# Patient Record
Sex: FEMALE | Race: WHITE | Employment: OTHER | URBAN - METROPOLITAN AREA
[De-identification: names, ages, dates, MRNs, and addresses within clinical notes are randomized per-mention and may not be internally consistent; named-entity substitution may affect disease eponyms.]

---

## 2017-06-26 ENCOUNTER — HOSPITAL ENCOUNTER (OUTPATIENT)
Dept: MAMMOGRAPHY | Facility: CLINIC | Age: 62
Discharge: HOME | End: 2017-06-26
Attending: FAMILY MEDICINE | Admitting: FAMILY MEDICINE
Payer: COMMERCIAL

## 2017-06-26 DIAGNOSIS — Z12.31 VISIT FOR SCREENING MAMMOGRAM: ICD-10-CM

## 2017-06-26 PROCEDURE — G0202 SCR MAMMO BI INCL CAD: HCPCS

## 2017-12-23 ENCOUNTER — APPOINTMENT (OUTPATIENT)
Dept: GENERAL RADIOLOGY | Facility: CLINIC | Age: 62
End: 2017-12-23
Attending: EMERGENCY MEDICINE
Payer: COMMERCIAL

## 2017-12-23 ENCOUNTER — APPOINTMENT (OUTPATIENT)
Dept: MRI IMAGING | Facility: CLINIC | Age: 62
End: 2017-12-23
Attending: EMERGENCY MEDICINE
Payer: COMMERCIAL

## 2017-12-23 ENCOUNTER — HOSPITAL ENCOUNTER (EMERGENCY)
Facility: CLINIC | Age: 62
Discharge: HOME | End: 2017-12-24
Attending: EMERGENCY MEDICINE | Admitting: EMERGENCY MEDICINE
Payer: COMMERCIAL

## 2017-12-23 DIAGNOSIS — M60.009 VIRAL MYOSITIS: ICD-10-CM

## 2017-12-23 DIAGNOSIS — M54.10 RADICULAR PAIN OF BOTH LOWER EXTREMITIES: ICD-10-CM

## 2017-12-23 DIAGNOSIS — R50.9 FEVER, UNSPECIFIED FEVER CAUSE: ICD-10-CM

## 2017-12-23 DIAGNOSIS — B97.89 VIRAL MYOSITIS: ICD-10-CM

## 2017-12-23 LAB
ALBUMIN SERPL-MCNC: 3.3 G/DL (ref 3.4–5)
ALBUMIN UR-MCNC: 100 MG/DL
ALP SERPL-CCNC: 42 U/L (ref 40–150)
ALT SERPL W P-5'-P-CCNC: 57 U/L (ref 0–50)
ANION GAP SERPL CALCULATED.3IONS-SCNC: 10 MMOL/L (ref 3–14)
APPEARANCE UR: ABNORMAL
AST SERPL W P-5'-P-CCNC: 90 U/L (ref 0–45)
BACTERIA #/AREA URNS HPF: ABNORMAL /HPF
BASOPHILS # BLD AUTO: 0 10E9/L (ref 0–0.2)
BASOPHILS NFR BLD AUTO: 0.2 %
BILIRUB SERPL-MCNC: 0.9 MG/DL (ref 0.2–1.3)
BILIRUB UR QL STRIP: NEGATIVE
BUN SERPL-MCNC: 20 MG/DL (ref 7–30)
CALCIUM SERPL-MCNC: 7.8 MG/DL (ref 8.5–10.1)
CHLORIDE SERPL-SCNC: 95 MMOL/L (ref 94–109)
CK SERPL-CCNC: 222 U/L (ref 30–225)
CO2 SERPL-SCNC: 23 MMOL/L (ref 20–32)
COLOR UR AUTO: YELLOW
CREAT SERPL-MCNC: 0.75 MG/DL (ref 0.52–1.04)
CRP SERPL-MCNC: 14.1 MG/L (ref 0–8)
DIFFERENTIAL METHOD BLD: ABNORMAL
EOSINOPHIL # BLD AUTO: 0 10E9/L (ref 0–0.7)
EOSINOPHIL NFR BLD AUTO: 0.4 %
ERYTHROCYTE [DISTWIDTH] IN BLOOD BY AUTOMATED COUNT: 11.9 % (ref 10–15)
ERYTHROCYTE [SEDIMENTATION RATE] IN BLOOD BY WESTERGREN METHOD: 7 MM/H (ref 0–30)
FLUAV+FLUBV AG SPEC QL: NEGATIVE
FLUAV+FLUBV AG SPEC QL: NEGATIVE
GFR SERPL CREATININE-BSD FRML MDRD: 78 ML/MIN/1.7M2
GLUCOSE SERPL-MCNC: 110 MG/DL (ref 70–99)
GLUCOSE UR STRIP-MCNC: NEGATIVE MG/DL
HCT VFR BLD AUTO: 35.4 % (ref 35–47)
HGB BLD-MCNC: 12.9 G/DL (ref 11.7–15.7)
HGB UR QL STRIP: NEGATIVE
IMM GRANULOCYTES # BLD: 0 10E9/L (ref 0–0.4)
IMM GRANULOCYTES NFR BLD: 0.4 %
KETONES UR STRIP-MCNC: NEGATIVE MG/DL
LEUKOCYTE ESTERASE UR QL STRIP: NEGATIVE
LYMPHOCYTES # BLD AUTO: 0.7 10E9/L (ref 0.8–5.3)
LYMPHOCYTES NFR BLD AUTO: 13.1 %
MCH RBC QN AUTO: 32.6 PG (ref 26.5–33)
MCHC RBC AUTO-ENTMCNC: 36.4 G/DL (ref 31.5–36.5)
MCV RBC AUTO: 89 FL (ref 78–100)
MONOCYTES # BLD AUTO: 0.3 10E9/L (ref 0–1.3)
MONOCYTES NFR BLD AUTO: 5.1 %
MUCOUS THREADS #/AREA URNS LPF: PRESENT /LPF
MYOGLOBIN SERPL-MCNC: 155 UG/L
NEUTROPHILS # BLD AUTO: 4.5 10E9/L (ref 1.6–8.3)
NEUTROPHILS NFR BLD AUTO: 80.8 %
NITRATE UR QL: NEGATIVE
NRBC # BLD AUTO: 0 10*3/UL
NRBC BLD AUTO-RTO: 0 /100
PH UR STRIP: 6 PH (ref 5–7)
PLATELET # BLD AUTO: 93 10E9/L (ref 150–450)
PLATELET # BLD EST: ABNORMAL 10*3/UL
POTASSIUM SERPL-SCNC: 3.6 MMOL/L (ref 3.4–5.3)
PROT SERPL-MCNC: 6.7 G/DL (ref 6.8–8.8)
RBC # BLD AUTO: 3.96 10E12/L (ref 3.8–5.2)
RBC #/AREA URNS AUTO: 2 /HPF (ref 0–2)
RBC MORPH BLD: ABNORMAL
SODIUM SERPL-SCNC: 128 MMOL/L (ref 133–144)
SOURCE: ABNORMAL
SP GR UR STRIP: 1.02 (ref 1–1.03)
SPECIMEN SOURCE: NORMAL
SQUAMOUS #/AREA URNS AUTO: 1 /HPF (ref 0–1)
UROBILINOGEN UR STRIP-MCNC: 0 MG/DL (ref 0–2)
VARIANT LYMPHS BLD QL SMEAR: PRESENT
WBC # BLD AUTO: 5.5 10E9/L (ref 4–11)
WBC #/AREA URNS AUTO: 4 /HPF (ref 0–2)

## 2017-12-23 PROCEDURE — 87804 INFLUENZA ASSAY W/OPTIC: CPT | Performed by: EMERGENCY MEDICINE

## 2017-12-23 PROCEDURE — 96374 THER/PROPH/DIAG INJ IV PUSH: CPT | Mod: 59

## 2017-12-23 PROCEDURE — 25000128 H RX IP 250 OP 636: Performed by: EMERGENCY MEDICINE

## 2017-12-23 PROCEDURE — 80053 COMPREHEN METABOLIC PANEL: CPT | Performed by: EMERGENCY MEDICINE

## 2017-12-23 PROCEDURE — 71020 XR CHEST 2 VW: CPT

## 2017-12-23 PROCEDURE — 25000132 ZZH RX MED GY IP 250 OP 250 PS 637: Performed by: EMERGENCY MEDICINE

## 2017-12-23 PROCEDURE — 72158 MRI LUMBAR SPINE W/O & W/DYE: CPT

## 2017-12-23 PROCEDURE — 86140 C-REACTIVE PROTEIN: CPT | Performed by: EMERGENCY MEDICINE

## 2017-12-23 PROCEDURE — 99285 EMERGENCY DEPT VISIT HI MDM: CPT | Mod: 25

## 2017-12-23 PROCEDURE — 85025 COMPLETE CBC W/AUTO DIFF WBC: CPT | Performed by: EMERGENCY MEDICINE

## 2017-12-23 PROCEDURE — 82550 ASSAY OF CK (CPK): CPT | Performed by: EMERGENCY MEDICINE

## 2017-12-23 PROCEDURE — 83874 ASSAY OF MYOGLOBIN: CPT | Performed by: EMERGENCY MEDICINE

## 2017-12-23 PROCEDURE — 87040 BLOOD CULTURE FOR BACTERIA: CPT | Mod: 91 | Performed by: EMERGENCY MEDICINE

## 2017-12-23 PROCEDURE — 96375 TX/PRO/DX INJ NEW DRUG ADDON: CPT

## 2017-12-23 PROCEDURE — 36415 COLL VENOUS BLD VENIPUNCTURE: CPT | Performed by: EMERGENCY MEDICINE

## 2017-12-23 PROCEDURE — 96361 HYDRATE IV INFUSION ADD-ON: CPT

## 2017-12-23 PROCEDURE — 81001 URINALYSIS AUTO W/SCOPE: CPT | Performed by: EMERGENCY MEDICINE

## 2017-12-23 PROCEDURE — 85652 RBC SED RATE AUTOMATED: CPT | Performed by: EMERGENCY MEDICINE

## 2017-12-23 PROCEDURE — A9585 GADOBUTROL INJECTION: HCPCS | Performed by: EMERGENCY MEDICINE

## 2017-12-23 RX ORDER — SODIUM CHLORIDE 9 MG/ML
1000 INJECTION, SOLUTION INTRAVENOUS CONTINUOUS
Status: DISCONTINUED | OUTPATIENT
Start: 2017-12-23 | End: 2017-12-24 | Stop reason: HOSPADM

## 2017-12-23 RX ORDER — KETOROLAC TROMETHAMINE 30 MG/ML
30 INJECTION, SOLUTION INTRAMUSCULAR; INTRAVENOUS ONCE
Status: COMPLETED | OUTPATIENT
Start: 2017-12-23 | End: 2017-12-23

## 2017-12-23 RX ORDER — GADOBUTROL 604.72 MG/ML
7.5 INJECTION INTRAVENOUS ONCE
Status: COMPLETED | OUTPATIENT
Start: 2017-12-23 | End: 2017-12-23

## 2017-12-23 RX ORDER — ACETAMINOPHEN 325 MG/1
650 TABLET ORAL ONCE
Status: COMPLETED | OUTPATIENT
Start: 2017-12-23 | End: 2017-12-23

## 2017-12-23 RX ORDER — HYDROMORPHONE HYDROCHLORIDE 1 MG/ML
0.5 INJECTION, SOLUTION INTRAMUSCULAR; INTRAVENOUS; SUBCUTANEOUS
Status: DISCONTINUED | OUTPATIENT
Start: 2017-12-23 | End: 2017-12-24 | Stop reason: HOSPADM

## 2017-12-23 RX ADMIN — HYDROMORPHONE HYDROCHLORIDE 0.5 MG: 1 INJECTION, SOLUTION INTRAMUSCULAR; INTRAVENOUS; SUBCUTANEOUS at 22:17

## 2017-12-23 RX ADMIN — KETOROLAC TROMETHAMINE 30 MG: 30 INJECTION, SOLUTION INTRAMUSCULAR at 21:17

## 2017-12-23 RX ADMIN — SODIUM CHLORIDE 1000 ML: 9 INJECTION, SOLUTION INTRAVENOUS at 21:17

## 2017-12-23 RX ADMIN — ACETAMINOPHEN 650 MG: 325 TABLET, FILM COATED ORAL at 21:17

## 2017-12-23 RX ADMIN — GADOBUTROL 7 ML: 604.72 INJECTION INTRAVENOUS at 23:38

## 2017-12-23 ASSESSMENT — ENCOUNTER SYMPTOMS
VOMITING: 0
FEVER: 1
ARTHRALGIAS: 1
BACK PAIN: 1
NUMBNESS: 0
WEAKNESS: 0
CHILLS: 1
ABDOMINAL PAIN: 0
SHORTNESS OF BREATH: 0
NAUSEA: 0

## 2017-12-23 NOTE — ED AVS SNAPSHOT
[facility] Emergency Department    [address] [place] [address]    Phone:  [phone]    Fax:  [phone]                                       Kaleigh Ziegler   MRN: [id number]    Department:  [facility] Emergency Department   Date of Visit:  12/23/2017           Patient Information     Date Of Birth          1955        Your diagnoses for this visit were:     Fever, unspecified fever cause     Viral myositis     Radicular pain of both lower extremities        You were seen by Ej Fry MD and Saul Garcia MD.      Follow-up Information     Follow up with Heaven Rose MD.    Specialty:  Family Practice    Why:  Early this week in f/u    Contact information:    [facility]  [address]  [place] [address]  [phone]          Discharge Instructions       Discharge Instructions  Fever    You have been seen today for a fever. Fever is a normal body reaction to illness or inflammation. Fever is a sign that your body is doing what it should to fight something off. Fever is not dangerous, but it can make you feel miserable, and you will probably feel better if you get your fever to go down. Most infections are caused by a virus, and antibiotics will not help; your provider will tell you whether antibiotics are needed in your case. At this time your provider does not find that your fever is a sign of anything dangerous or life-threatening.  However, sometimes the signs of serious illness do not show up right away so additional care may be necessary.    Generally, every Emergency Department visit should have a follow-up clinic visit with either a primary or a specialty clinic/provider. Please follow-up as instructed by your emergency provider today.    What can I do to help myself?    Fill any prescriptions the provider gave you and take them right away--especially antibiotics.    If you have a fever, get plenty of  rest and drink lots of fluids, especially water.    What clothes or blankets you have on will not change your fever. Do what is comfortable for you.    Bathing or sponging in lukewarm water may help you feel better.    Tylenol  (acetaminophen), Motrin  (ibuprofen), or Advil  (ibuprofen) help bring fever down and may help you feel more comfortable. Be sure to read and follow the package directions, and ask your provider if you have questions.    Do not drink alcohol.    Return to the Emergency Department if:    Any of the symptoms you have get much worse.    You seem very sick, like being too weak to get up.    You have any new symptoms, especially serious things like abdominal (belly) pain or chest pain.    You are short of breath.    You have a severe headache.    You are vomiting (throwing up) so much you cannot keep fluids or medicines down.    You have confusion or seem unusually drowsy.    You have a seizure.    You have anything else that worries you.  If you were given a prescription for medicine here today, be sure to read all of the information (including the package insert) that comes with your prescription.  This will include important information about the medicine, its side effects, and any warnings that you need to know about.  The pharmacist who fills the prescription can provide more information and answer questions you may have about the medicine.  If you have questions or concerns that the pharmacist cannot address, please call or return to the Emergency Department.   Remember that you can always come back to the Emergency Department if you are not able to see your regular provider in the amount of time listed above, if you get any new symptoms, or if there is anything that worries you.      Discharge References/Attachments     LUMBAR RADICULOPATHY, UNDERSTANDING (ENGLISH)      24 Hour Appointment Hotline       To make an appointment at any [facility], call [phone] ([phone]). If  you don't have a family doctor or clinic, we will help you find one. [facility] are conveniently located to serve the needs of you and your family.             Review of your medicines      START taking        Dose / Directions Last dose taken    traMADol 50 MG tablet   Commonly known as:  ULTRAM   Dose:  50 mg   Quantity:  10 tablet        Take 1 tablet (50 mg) by mouth every 6 hours as needed for pain   Refills:  0                Prescriptions were sent or printed at these locations (1 Prescription)                   Other Prescriptions                Printed at Department/Unit printer (1 of 1)         traMADol (ULTRAM) 50 MG tablet                Procedures and tests performed during your visit     Procedure/Test Number of Times Performed    Blood culture 2    CBC with platelets differential 1    CK total 1    CRP inflammation 1    Chest XR,  PA & LAT 1    Comprehensive metabolic panel 1    Erythrocyte sedimentation rate auto 1    Influenza A/B antigen 1    MR Lumbar Spine w/o & w Contrast 1    Myoglobin 1    UA with Microscopic reflex to Culture 1      Orders Needing Specimen Collection     None      Pending Results     Date and Time Order Name Status Description    12/23/2017 2151 MR Lumbar Spine w/o & w Contrast In process     12/23/2017 2110 Blood culture In process     12/23/2017 2101 Blood culture Preliminary             Pending Culture Results     Date and Time Order Name Status Description    12/23/2017 2110 Blood culture In process     12/23/2017 2101 Blood culture Preliminary             Pending Results Instructions     If you had any lab results that were not finalized at the time of your Discharge, you can call the ED Lab Result RN at [phone]. You will be contacted by this team for any positive Lab results or changes in treatment. The nurses are available 7 days a week from 10A to 6:30P.  You can leave a message 24 hours per day and they will return your call.        Test Results From Your  Hospital Stay        12/23/2017  9:51 PM      Component Results     Component Value Ref Range & Units Status    WBC 5.5 4.0 - 11.0 10e9/L Final    RBC Count 3.96 3.8 - 5.2 10e12/L Final    Hemoglobin 12.9 11.7 - 15.7 g/dL Final    Hematocrit 35.4 35.0 - 47.0 % Final    MCV 89 78 - 100 fl Final    MCH 32.6 26.5 - 33.0 pg Final    MCHC 36.4 31.5 - 36.5 g/dL Final    RDW 11.9 10.0 - 15.0 % Final    Platelet Count 93 (L) 150 - 450 10e9/L Final    Diff Method Automated Method  Final    % Neutrophils 80.8 % Final    % Lymphocytes 13.1 % Final    % Monocytes 5.1 % Final    % Eosinophils 0.4 % Final    % Basophils 0.2 % Final    % Immature Granulocytes 0.4 % Final    Nucleated RBCs 0 0 /100 Final    Absolute Neutrophil 4.5 1.6 - 8.3 10e9/L Final    Absolute Lymphocytes 0.7 (L) 0.8 - 5.3 10e9/L Final    Absolute Monocytes 0.3 0.0 - 1.3 10e9/L Final    Absolute Eosinophils 0.0 0.0 - 0.7 10e9/L Final    Absolute Basophils 0.0 0.0 - 0.2 10e9/L Final    Abs Immature Granulocytes 0.0 0 - 0.4 10e9/L Final    Absolute Nucleated RBC 0.0  Final    Reactive Lymphs Present  Final    RBC Morphology   Final    Consistent with reported results    Platelet Estimate   Final    Automated count confirmed.  Platelet morphology is normal.         12/23/2017  9:43 PM      Component Results     Component Value Ref Range & Units Status    Sodium 128 (L) 133 - 144 mmol/L Final    Potassium 3.6 3.4 - 5.3 mmol/L Final    Chloride 95 94 - 109 mmol/L Final    Carbon Dioxide 23 20 - 32 mmol/L Final    Anion Gap 10 3 - 14 mmol/L Final    Glucose 110 (H) 70 - 99 mg/dL Final    Urea Nitrogen 20 7 - 30 mg/dL Final    Creatinine 0.75 0.52 - 1.04 mg/dL Final    GFR Estimate 78 >60 mL/min/1.7m2 Final    Non  GFR Calc    GFR Estimate If Black >90 >60 mL/min/1.7m2 Final    African American GFR Calc    Calcium 7.8 (L) 8.5 - 10.1 mg/dL Final    Bilirubin Total 0.9 0.2 - 1.3 mg/dL Final    Albumin 3.3 (L) 3.4 - 5.0 g/dL Final    Protein Total 6.7  (L) 6.8 - 8.8 g/dL Final    Alkaline Phosphatase 42 40 - 150 U/L Final    ALT 57 (H) 0 - 50 U/L Final    AST 90 (H) 0 - 45 U/L Final         12/23/2017  9:43 PM      Component Results     Component Value Ref Range & Units Status    CRP Inflammation 14.1 (H) 0.0 - 8.0 mg/L Final         12/23/2017  9:37 PM      Component Results     Component Value Ref Range & Units Status    Sed Rate 7 0 - 30 mm/h Final         12/23/2017 11:04 PM      Component Results     Component    Specimen Description    Blood Left Arm    Special Requests    Aerobic and anaerobic bottles received    Culture Micro    PENDING         12/23/2017  9:43 PM      Component Results     Component Value Ref Range & Units Status    Myoglobin 155 (H) <120 ug/L Final         12/23/2017  9:43 PM      Component Results     Component Value Ref Range & Units Status    CK Total 222 30 - 225 U/L Final         12/23/2017  9:43 PM      Component Results     Component Value Ref Range & Units Status    Influenza A/B Agn Specimen Nasal  Final    Influenza A Negative NEG^Negative Final    Influenza B Negative NEG^Negative Final    Test results must be correlated with clinical data. If necessary, results   should be confirmed by a molecular assay or viral culture.           12/23/2017  9:36 PM         12/23/2017 11:00 PM      Result not yet available     Exam Ended         12/23/2017 10:48 PM      Component Results     Component Value Ref Range & Units Status    Color Urine Yellow  Final    Appearance Urine Slightly Cloudy  Final    Glucose Urine Negative NEG^Negative mg/dL Final    Bilirubin Urine Negative NEG^Negative Final    Ketones Urine Negative NEG^Negative mg/dL Final    Specific Gravity Urine 1.024 1.003 - 1.035 Final    Blood Urine Negative NEG^Negative Final    pH Urine 6.0 5.0 - 7.0 pH Final    Protein Albumin Urine 100 (A) NEG^Negative mg/dL Final    Urobilinogen mg/dL 0.0 0.0 - 2.0 mg/dL Final    Nitrite Urine Negative NEG^Negative Final    Leukocyte  Esterase Urine Negative NEG^Negative Final    Source Midstream Urine  Final    WBC Urine 4 (H) 0 - 2 /HPF Final    RBC Urine 2 0 - 2 /HPF Final    Bacteria Urine Few (A) NEG^Negative /HPF Final    Squamous Epithelial /HPF Urine 1 0 - 1 /HPF Final    Mucous Urine Present (A) NEG^Negative /LPF Final         12/23/2017 11:02 PM      Narrative     CHEST TWO VIEWS    12/23/2017 10:52 PM     HISTORY: Fever.    COMPARISON: None.        Impression     IMPRESSION: Normal.     AMBROCIO GARCIA MD                Clinical Quality Measure: Blood Pressure Screening     Your blood pressure was checked while you were in the emergency department today. The last reading we obtained was  BP: 107/60 . Please read the guidelines below about what these numbers mean and what you should do about them.  If your systolic blood pressure (the top number) is less than 120 and your diastolic blood pressure (the bottom number) is less than 80, then your blood pressure is normal. There is nothing more that you need to do about it.  If your systolic blood pressure (the top number) is 120-139 or your diastolic blood pressure (the bottom number) is 80-89, your blood pressure may be higher than it should be. You should have your blood pressure rechecked within a year by a primary care provider.  If your systolic blood pressure (the top number) is 140 or greater or your diastolic blood pressure (the bottom number) is 90 or greater, you may have high blood pressure. High blood pressure is treatable, but if left untreated over time it can put you at risk for heart attack, stroke, or kidney failure. You should have your blood pressure rechecked by a primary care provider within the next 4 weeks.  If your provider in the emergency department today gave you specific instructions to follow-up with your doctor or provider even sooner than that, you should follow that instruction and not wait for up to 4 weeks for your follow-up visit.        Thank you for  "choosing [facility]       Thank you for choosing [facility] for your care. Our goal is always to provide you with excellent care. Hearing back from our patients is one way we can continue to improve our services. Please take a few minutes to complete the written survey that you may receive in the mail after you visit with us. Thank you!        [employer] Information     [employer]unb lets you send messages to your doctor, view your test results, renew your prescriptions, schedule appointments and more. To sign up, go to [email or web address] . Click on \"Log in\" on the left side of the screen, which will take you to the Welcome page. Then click on \"Sign up Now\" on the right side of the page.     You will be asked to enter the access code listed below, as well as some personal information. Please follow the directions to create your username and password.     Your access code is: [email or web address]  Expires: 3/24/2018 12:54 AM     Your access code will  in 90 days. If you need help or a new code, please call your [facility] clinic or [phone].        Care EveryWhere ID     This is your Care EveryWhere ID. This could be used by other organizations to access your [facility] medical records  [id number]        Equal Access to Services     LUCAS MEANS : Yogi Holloway, wachatoda kiana, qaybta kaalmada adedeidre, bryan cabrera. So [facility] [phone].    ATENCIÓN: Si habla español, tiene a laughlin disposición servicios gratuitos de asistencia lingüística. Llame al [phone].    We comply with applicable federal civil rights laws and [place] laws. We do not discriminate on the basis of race, color, national origin, age, disability, sex, sexual orientation, or gender identity.            After Visit Summary       This is your record. Keep this with you and show to your community pharmacist(s) and doctor(s) at your next visit.                  "

## 2017-12-23 NOTE — ED AVS SNAPSHOT
[facility] Emergency Department    [address]    [facility] [address]    Phone:  [phone]    Fax:  [phone]                                       Kaleigh Ziegler   MRN: [id number]    Department:  [facility] Emergency Department   Date of Visit:  12/23/2017           After Visit Summary Signature Page     I have received my discharge instructions, and my questions have been answered. I have discussed any challenges I see with this plan with the nurse or doctor.    ..........................................................................................................................................  Patient/Patient Representative Signature      ..........................................................................................................................................  Patient Representative Print Name and Relationship to Patient    ..................................................               ................................................  Date                                            Time    ..........................................................................................................................................  Reviewed by Signature/Title    ...................................................              ..............................................  Date                                                            Time

## 2017-12-24 VITALS
OXYGEN SATURATION: 98 % | DIASTOLIC BLOOD PRESSURE: 72 MMHG | WEIGHT: 149.47 LBS | TEMPERATURE: 99 F | HEART RATE: 90 BPM | RESPIRATION RATE: 18 BRPM | SYSTOLIC BLOOD PRESSURE: 112 MMHG

## 2017-12-24 RX ORDER — TRAMADOL HYDROCHLORIDE 50 MG/1
50 TABLET ORAL EVERY 6 HOURS PRN
Qty: 10 TABLET | Refills: 0 | Status: ON HOLD | OUTPATIENT
Start: 2017-12-24 | End: 2018-01-16

## 2017-12-24 NOTE — ED NOTES
Patient signed out to me at 2300.  Fever and bilateral LE radiculopathy symptoms.  Labs c/w viral process (normal wbc, thrombocytopenia, mild lft elevation, hyponatremia).  See work up to this point.  MRI of spine pending to r/o abscess, discitis, transverse myelitis, ect.  If ok, plan to d/c.  Blood cultures sent.  Influenza and CXR neg.  UA negative.      Saul Garcia MD  12/23/17 6441

## 2017-12-24 NOTE — DISCHARGE INSTRUCTIONS
Discharge Instructions  Fever    You have been seen today for a fever. Fever is a normal body reaction to illness or inflammation. Fever is a sign that your body is doing what it should to fight something off. Fever is not dangerous, but it can make you feel miserable, and you will probably feel better if you get your fever to go down. Most infections are caused by a virus, and antibiotics will not help; your provider will tell you whether antibiotics are needed in your case. At this time your provider does not find that your fever is a sign of anything dangerous or life-threatening.  However, sometimes the signs of serious illness do not show up right away so additional care may be necessary.    Generally, every Emergency Department visit should have a follow-up clinic visit with either a primary or a specialty clinic/provider. Please follow-up as instructed by your emergency provider today.    What can I do to help myself?    Fill any prescriptions the provider gave you and take them right away--especially antibiotics.    If you have a fever, get plenty of rest and drink lots of fluids, especially water.    What clothes or blankets you have on will not change your fever. Do what is comfortable for you.    Bathing or sponging in lukewarm water may help you feel better.    Tylenol  (acetaminophen), Motrin  (ibuprofen), or Advil  (ibuprofen) help bring fever down and may help you feel more comfortable. Be sure to read and follow the package directions, and ask your provider if you have questions.    Do not drink alcohol.    Return to the Emergency Department if:    Any of the symptoms you have get much worse.    You seem very sick, like being too weak to get up.    You have any new symptoms, especially serious things like abdominal (belly) pain or chest pain.    You are short of breath.    You have a severe headache.    You are vomiting (throwing up) so much you cannot keep fluids or medicines down.    You have  confusion or seem unusually drowsy.    You have a seizure.    You have anything else that worries you.  If you were given a prescription for medicine here today, be sure to read all of the information (including the package insert) that comes with your prescription.  This will include important information about the medicine, its side effects, and any warnings that you need to know about.  The pharmacist who fills the prescription can provide more information and answer questions you may have about the medicine.  If you have questions or concerns that the pharmacist cannot address, please call or return to the Emergency Department.   Remember that you can always come back to the Emergency Department if you are not able to see your regular provider in the amount of time listed above, if you get any new symptoms, or if there is anything that worries you.

## 2017-12-24 NOTE — ED NOTES
Prelim MRI read:  No acute abnormality of the lumbar spine.  Mild degenerative changes in the lumbar spine including a few posterior disc buldges.      Saul Garcia MD  12/24/17 0027

## 2017-12-24 NOTE — ED PROVIDER NOTES
History     Chief Complaint:  Leg Pain    HPI:  Kaleigh Ziegler is a 62 year old female who presents with leg pain. The patient reports that she has been experiencing cramping tightness in her upper and lower right leg, worse in her lower leg. This has been ongoing for the past couple months and has been relieved with stretching of her IT band. However, she developed a slight fever about four days ago along with chills. In addition, her leg pain has worsened and is even worse with laying down. Today, she developed the same pain in her right leg, leading to her ED visit. Additionally, she complains of low back pain that is ongoing from a previous biking history, but this is not a concern of hers. She denies significant abdominal pain, nausea, vomiting, cough, shortness of breath, rash, numbness, or weakness. She has not taken anything for pain as she prefers homeopathy. She does regularly see a primary care doctor.     Allergies:  No known drug allergies      Medications:    The patient is not currently taking any prescribed medications.      Past Medical History:    History of mitral valve prolapse    Past Surgical History:    GYN surgery  Tubal ligation    Family History:    History reviewed. No pertinent family history.      Social History:  Smoking status: Never Smoker  Alcohol use: No   Marital Status:     Presents with  at bedside.      Review of Systems   Constitutional: Positive for chills and fever.   Respiratory: Negative for shortness of breath.    Gastrointestinal: Negative for abdominal pain, nausea and vomiting.   Musculoskeletal: Positive for arthralgias and back pain.   Skin: Negative for rash.   Neurological: Negative for weakness and numbness.   All other systems reviewed and are negative.      Physical Exam     Patient Vitals for the past 24 hrs:   BP Temp Temp src Pulse Resp SpO2 Weight   12/23/17 2040 107/60 100.8  F (38.2  C) Oral 103 14 94 % 67.8 kg (149 lb 7.6 oz)       Physical Exam   Constitutional: She is oriented to person, place, and time. She appears well-developed.   HENT:   Head: Normocephalic and atraumatic.   Right Ear: External ear normal.   Mouth/Throat: Oropharynx is clear and moist.   Eyes: Conjunctivae and EOM are normal. Pupils are equal, round, and reactive to light.   Neck: Normal range of motion. Neck supple. No JVD present.   Cardiovascular: Normal rate, regular rhythm and normal heart sounds.    Pulmonary/Chest: Effort normal and breath sounds normal.   Abdominal: Soft. Bowel sounds are normal. She exhibits no distension. There is no tenderness. There is no rebound.   Musculoskeletal: Normal range of motion.        Legs:  Lymphadenopathy:     She has no cervical adenopathy.   Neurological: She is alert and oriented to person, place, and time. She displays normal reflexes. No cranial nerve deficit. She exhibits normal muscle tone. Coordination normal.   Skin: Skin is warm and dry.   Psychiatric: She has a normal mood and affect. Her behavior is normal. Judgment normal.   Nursing note and vitals reviewed.  :    Emergency Department Course     Imaging:  Radiographic findings were communicated with the patient and family who voiced understanding of the findings.    X-ray Chest, 2 views:  Pending    MR Lumbar Spine w/o & w/ Contrast:  Pending    Laboratory:  Influenza A/B: Negative  CBC: PLT 93 (L), o/w WNL (WBC 5.5, HGB 12.9)    CMP: Sodium 128 (L), Glucose 110 (H), Calcium 7.8 (L), Albumin 3.3 (L), Protein Total 6.7 (L), ALT 57 (H), AST 90 (H), o/w WNL (Creatinine 0.75)   CRP Inflammation: 14.1 (H)   ESR: 7  Blood Culture: Pending  Myoglobin: 155 (H)   CK Total: 222    Interventions:  2117- NS 1L IV Bolus   2117- Toradol 30 mg IV  2117- Tylenol 650 mg IV  2217- Dilaudid 0.5 mg IV    Emergency Department Course:  Past medical records, nursing notes, and vitals reviewed.  2050: I performed an exam of the patient and obtained history, as documented above. GCS 15.      IV inserted and blood drawn.     A nasal specimen was collected and tested.     The above interventions were administered.     2146: I rechecked the patient. Explained findings to the patient.     The patient was sent for a chest X-Ray while in the emergency department, findings above.     The patient was sent for a lumbar spine MRI while in the emergency department, findings above.     The patient was signed-out to the oncoming physician.     Impression & Plan      Medical Decision Making:  Kaleigh Ziegler is a 62 year old female who presents to the emergency department for evaluation of bilateral leg pain. Over the past few months, she reports similar pain, but this has been worse for the past few weeks. She has no cauda equina symptoms, but she describes bilateral radiculopathy and has a low-grade fever here. The cause of her symptoms is unclear and her inflammatory testing is indeterminate. She has no clear focus of infection on her examination other than lumbar radiculopathy. Differential diagnosis includes transverse myelitis, epidural abscess, and diskitis. I recommended MRI with and without contrast. This will be signed out to the oncoming physician. If this is negative, we will consider discharge home with medication for pain and follow up with primary care.     Provisional Diagnosis:  1. Bilateral S1 radiculopathy  2. Fever    Diagnosis:    ICD-10-CM    1. Fever, unspecified fever cause R50.9 Blood culture     Blood culture   2. Viral myositis M60.009     B97.89    3. Radicular pain of both lower extremities M54.10        Disposition:  Signed out to the oncoming physician.     Yenny Ulrich  12/23/2017   [facility] EMERGENCY DEPARTMENT    I, Yenny Ulrich, am serving as a scribe at 8:50 PM on 12/23/2017 to document services personally performed by Ej Fry MD based on my observations and the provider's statements to me.       Ej Fry MD  12/28/17 5419

## 2017-12-24 NOTE — ED NOTES
Pt reports cramping tightness in her right leg from her hip to her foot, worse in lower leg, happening on and off for the last few months. Last Tuesday pt began to have chills and felt better by morning but leg pain started getting worse. Pt states it is both legs now and believes the virus she got on Tuesday has settled in her legs. Pain is worse with lying down. Pt has not taken anything for the pain today. Pt has not taken any medications in 20 years, states she prefers homeopathy. Pt does have a primary care doctor she sees.

## 2017-12-27 ENCOUNTER — HOSPITAL ENCOUNTER (EMERGENCY)
Facility: CLINIC | Age: 62
Discharge: HOME | End: 2017-12-27
Attending: EMERGENCY MEDICINE | Admitting: EMERGENCY MEDICINE
Payer: COMMERCIAL

## 2017-12-27 VITALS
HEART RATE: 100 BPM | SYSTOLIC BLOOD PRESSURE: 115 MMHG | RESPIRATION RATE: 16 BRPM | TEMPERATURE: 99.3 F | DIASTOLIC BLOOD PRESSURE: 82 MMHG | OXYGEN SATURATION: 99 %

## 2017-12-27 DIAGNOSIS — G62.9 NEUROPATHY: ICD-10-CM

## 2017-12-27 DIAGNOSIS — B37.0 THRUSH: ICD-10-CM

## 2017-12-27 DIAGNOSIS — M54.40 BILATERAL LOW BACK PAIN WITH SCIATICA, SCIATICA LATERALITY UNSPECIFIED, UNSPECIFIED CHRONICITY: ICD-10-CM

## 2017-12-27 LAB
ALBUMIN SERPL-MCNC: 3.2 G/DL (ref 3.4–5)
ALP SERPL-CCNC: 60 U/L (ref 40–150)
ALT SERPL W P-5'-P-CCNC: 169 U/L (ref 0–50)
ANION GAP SERPL CALCULATED.3IONS-SCNC: 6 MMOL/L (ref 3–14)
AST SERPL W P-5'-P-CCNC: 146 U/L (ref 0–45)
BASOPHILS # BLD AUTO: 0.1 10E9/L (ref 0–0.2)
BASOPHILS NFR BLD AUTO: 0.7 %
BILIRUB SERPL-MCNC: 0.9 MG/DL (ref 0.2–1.3)
BUN SERPL-MCNC: 12 MG/DL (ref 7–30)
CALCIUM SERPL-MCNC: 8.4 MG/DL (ref 8.5–10.1)
CHLORIDE SERPL-SCNC: 98 MMOL/L (ref 94–109)
CK SERPL-CCNC: 68 U/L (ref 30–225)
CO2 SERPL-SCNC: 28 MMOL/L (ref 20–32)
CREAT SERPL-MCNC: 0.64 MG/DL (ref 0.52–1.04)
CRP SERPL-MCNC: 101 MG/L (ref 0–8)
DIFFERENTIAL METHOD BLD: NORMAL
EOSINOPHIL # BLD AUTO: 0.1 10E9/L (ref 0–0.7)
EOSINOPHIL NFR BLD AUTO: 0.9 %
ERYTHROCYTE [DISTWIDTH] IN BLOOD BY AUTOMATED COUNT: 12.7 % (ref 10–15)
ERYTHROCYTE [SEDIMENTATION RATE] IN BLOOD BY WESTERGREN METHOD: 25 MM/H (ref 0–30)
GFR SERPL CREATININE-BSD FRML MDRD: >90 ML/MIN/1.7M2
GLUCOSE SERPL-MCNC: 117 MG/DL (ref 70–99)
HAV IGG SER QL IA: REACTIVE
HBV SURFACE AB SERPL IA-ACNC: 203.16 M[IU]/ML
HBV SURFACE AG SERPL QL IA: NONREACTIVE
HCT VFR BLD AUTO: 36.4 % (ref 35–47)
HCV AB SERPL QL IA: NONREACTIVE
HGB BLD-MCNC: 12.6 G/DL (ref 11.7–15.7)
HIV 1+2 AB+HIV1 P24 AG SERPL QL IA: NONREACTIVE
IMM GRANULOCYTES # BLD: 0.1 10E9/L (ref 0–0.4)
IMM GRANULOCYTES NFR BLD: 1.2 %
LYMPHOCYTES # BLD AUTO: 0.8 10E9/L (ref 0.8–5.3)
LYMPHOCYTES NFR BLD AUTO: 12.3 %
MCH RBC QN AUTO: 31.7 PG (ref 26.5–33)
MCHC RBC AUTO-ENTMCNC: 34.6 G/DL (ref 31.5–36.5)
MCV RBC AUTO: 92 FL (ref 78–100)
MONOCYTES # BLD AUTO: 0.7 10E9/L (ref 0–1.3)
MONOCYTES NFR BLD AUTO: 9.8 %
NEUTROPHILS # BLD AUTO: 5.1 10E9/L (ref 1.6–8.3)
NEUTROPHILS NFR BLD AUTO: 75.1 %
NRBC # BLD AUTO: 0 10*3/UL
NRBC BLD AUTO-RTO: 0 /100
PLATELET # BLD AUTO: 320 10E9/L (ref 150–450)
POTASSIUM SERPL-SCNC: 4.5 MMOL/L (ref 3.4–5.3)
PROT SERPL-MCNC: 7.4 G/DL (ref 6.8–8.8)
RBC # BLD AUTO: 3.97 10E12/L (ref 3.8–5.2)
SODIUM SERPL-SCNC: 132 MMOL/L (ref 133–144)
WBC # BLD AUTO: 6.8 10E9/L (ref 4–11)

## 2017-12-27 PROCEDURE — 25000132 ZZH RX MED GY IP 250 OP 250 PS 637: Performed by: EMERGENCY MEDICINE

## 2017-12-27 PROCEDURE — 86706 HEP B SURFACE ANTIBODY: CPT | Performed by: EMERGENCY MEDICINE

## 2017-12-27 PROCEDURE — 85025 COMPLETE CBC W/AUTO DIFF WBC: CPT | Performed by: EMERGENCY MEDICINE

## 2017-12-27 PROCEDURE — 85652 RBC SED RATE AUTOMATED: CPT | Performed by: EMERGENCY MEDICINE

## 2017-12-27 PROCEDURE — 86708 HEPATITIS A ANTIBODY: CPT | Performed by: EMERGENCY MEDICINE

## 2017-12-27 PROCEDURE — 86618 LYME DISEASE ANTIBODY: CPT | Performed by: EMERGENCY MEDICINE

## 2017-12-27 PROCEDURE — 86803 HEPATITIS C AB TEST: CPT | Performed by: EMERGENCY MEDICINE

## 2017-12-27 PROCEDURE — 87340 HEPATITIS B SURFACE AG IA: CPT | Performed by: EMERGENCY MEDICINE

## 2017-12-27 PROCEDURE — 82550 ASSAY OF CK (CPK): CPT | Performed by: EMERGENCY MEDICINE

## 2017-12-27 PROCEDURE — 87389 HIV-1 AG W/HIV-1&-2 AB AG IA: CPT | Performed by: EMERGENCY MEDICINE

## 2017-12-27 PROCEDURE — 86140 C-REACTIVE PROTEIN: CPT | Performed by: EMERGENCY MEDICINE

## 2017-12-27 PROCEDURE — 80053 COMPREHEN METABOLIC PANEL: CPT | Performed by: EMERGENCY MEDICINE

## 2017-12-27 PROCEDURE — 99283 EMERGENCY DEPT VISIT LOW MDM: CPT

## 2017-12-27 PROCEDURE — 36415 COLL VENOUS BLD VENIPUNCTURE: CPT | Performed by: EMERGENCY MEDICINE

## 2017-12-27 RX ORDER — IBUPROFEN 600 MG/1
600 TABLET, FILM COATED ORAL ONCE
Status: COMPLETED | OUTPATIENT
Start: 2017-12-27 | End: 2017-12-27

## 2017-12-27 RX ORDER — DIAZEPAM 5 MG
5 TABLET ORAL ONCE
Status: COMPLETED | OUTPATIENT
Start: 2017-12-27 | End: 2017-12-27

## 2017-12-27 RX ADMIN — IBUPROFEN 600 MG: 600 TABLET ORAL at 13:08

## 2017-12-27 RX ADMIN — DIAZEPAM 5 MG: 5 TABLET ORAL at 13:07

## 2017-12-27 ASSESSMENT — ENCOUNTER SYMPTOMS
MYALGIAS: 1
APPETITE CHANGE: 0
FEVER: 1

## 2017-12-27 NOTE — ED AVS SNAPSHOT
[facility] Emergency Department    [address] [place] [address]    Phone:  [phone]    Fax:  [phone]                                       Kaleigh Ziegler   MRN: [id number]    Department:  [facility] Emergency Department   Date of Visit:  12/27/2017           Patient Information     Date Of Birth          1955        Your diagnoses for this visit were:     Thrush     Bilateral low back pain with sciatica, sciatica laterality unspecified, unspecified chronicity     Neuropathy        You were seen by Andrew Crandall MD.      Follow-up Information     Follow up with Heaven Rose MD. Schedule an appointment as soon as possible for a visit in 1 day.    Specialty:  Family Practice    Contact information:    [facility] [facility]  [address]  [place] [address]  [phone]          Discharge Instructions         Possible Causes of Low Back or Leg Pain    The symptoms in your back or leg may be due to pressure on a nerve. This pressure may be caused by a damaged disk or by abnormal bone growth. Either way, you may feel pain, burning, tingling, or numbness. If you have pressure on a nerve that connects to the sciatic nerve, pain may shoot down your leg.    Pressure from the disk  Constant wear and tear can weaken a disk over time and cause back pain. The disk can then be damaged by a sudden movement or injury. If its soft center begins to bulge, the disk may press on a nerve. Or the outside of the disk may tear, and the soft center may squeeze through and pinch a nerve.    Pressure from bone  As a disk wears out, the vertebrae right above and below the disk begin to touch. This can put pressure on a nerve. Often, abnormal bone (called bone spurs) grows where the vertebrae rub against each other. This can cause the foramen or the spinal canal to narrow (called stenosis) and press against a nerve.  Date Last Reviewed: 10/4/2015     3065-7863 [employer]. [address] [place] [address], [place] [address]. All rights reserved. This information is not intended as a substitute for professional medical care. Always follow your healthcare professional's instructions.          24 Hour Appointment Hotline       To make an appointment at any [facility], call [phone] ([phone]). If you don't have a family doctor or clinic, we will help you find one. [facility] are conveniently located to serve the needs of you and your family.             Review of your medicines      Our records show that you are taking the medicines listed below. If these are incorrect, please call your family doctor or clinic.        Dose / Directions Last dose taken    traMADol 50 MG tablet   Commonly known as:  ULTRAM   Dose:  50 mg   Quantity:  10 tablet        Take 1 tablet (50 mg) by mouth every 6 hours as needed for pain   Refills:  0                Procedures and tests performed during your visit     CBC with platelets differential    CK total    CRP inflammation    Comprehensive metabolic panel    Erythrocyte sedimentation rate auto    HIV Antigen Antibody Combo    Hepatitis A Antibody IgG    Hepatitis B surface gladys immune s    Hepatitis B surface antigen    Hepatitis C antibody      Orders Needing Specimen Collection     Ordered          12/27/17 1202  UA with Microscopic - STAT, Prio: STAT, Needs to be Collected     Scheduled Task Status   12/27/17 1202 Collect UA with Microscopic Open   Order Class:  PCU Collect                  Pending Results     Date and Time Order Name Status Description    12/27/2017 1427 HIV Antigen Antibody Combo In process     12/27/2017 1427 Hepatitis A Antibody IgG In process     12/27/2017 1427 Hepatitis B surface gladys immune s In process     12/27/2017 1427 Hepatitis B surface antigen In process     12/27/2017 1427 Hepatitis C antibody In process             Pending Culture Results     No orders found from  12/25/2017 to 12/28/2017.            Pending Results Instructions     If you had any lab results that were not finalized at the time of your Discharge, you can call the ED Lab Result RN at [phone]. You will be contacted by this team for any positive Lab results or changes in treatment. The nurses are available 7 days a week from 10A to 6:30P.  You can leave a message 24 hours per day and they will return your call.        Test Results From Your Hospital Stay        12/27/2017  1:43 PM      Component Results     Component Value Ref Range & Units Status    WBC 6.8 4.0 - 11.0 10e9/L Final    RBC Count 3.97 3.8 - 5.2 10e12/L Final    Hemoglobin 12.6 11.7 - 15.7 g/dL Final    Hematocrit 36.4 35.0 - 47.0 % Final    MCV 92 78 - 100 fl Final    MCH 31.7 26.5 - 33.0 pg Final    MCHC 34.6 31.5 - 36.5 g/dL Final    RDW 12.7 10.0 - 15.0 % Final    Platelet Count 320 150 - 450 10e9/L Final    Diff Method Automated Method  Final    % Neutrophils 75.1 % Final    % Lymphocytes 12.3 % Final    % Monocytes 9.8 % Final    % Eosinophils 0.9 % Final    % Basophils 0.7 % Final    % Immature Granulocytes 1.2 % Final    Nucleated RBCs 0 0 /100 Final    Absolute Neutrophil 5.1 1.6 - 8.3 10e9/L Final    Absolute Lymphocytes 0.8 0.8 - 5.3 10e9/L Final    Absolute Monocytes 0.7 0.0 - 1.3 10e9/L Final    Absolute Eosinophils 0.1 0.0 - 0.7 10e9/L Final    Absolute Basophils 0.1 0.0 - 0.2 10e9/L Final    Abs Immature Granulocytes 0.1 0 - 0.4 10e9/L Final    Absolute Nucleated RBC 0.0  Final         12/27/2017  2:08 PM      Component Results     Component Value Ref Range & Units Status    Sodium 132 (L) 133 - 144 mmol/L Final    Potassium 4.5 3.4 - 5.3 mmol/L Final    Specimen slightly hemolyzed, potassium may be falsely elevated    Chloride 98 94 - 109 mmol/L Final    Carbon Dioxide 28 20 - 32 mmol/L Final    Anion Gap 6 3 - 14 mmol/L Final    Glucose 117 (H) 70 - 99 mg/dL Final    Urea Nitrogen 12 7 - 30 mg/dL Final    Creatinine 0.64 0.52  - 1.04 mg/dL Final    GFR Estimate >90 >60 mL/min/1.7m2 Final    Non  GFR Calc    GFR Estimate If Black >90 >60 mL/min/1.7m2 Final    African American GFR Calc    Calcium 8.4 (L) 8.5 - 10.1 mg/dL Final    Bilirubin Total 0.9 0.2 - 1.3 mg/dL Final    Albumin 3.2 (L) 3.4 - 5.0 g/dL Final    Protein Total 7.4 6.8 - 8.8 g/dL Final    Alkaline Phosphatase 60 40 - 150 U/L Final     (H) 0 - 50 U/L Final     (H) 0 - 45 U/L Final         12/27/2017  2:12 PM      Component Results     Component Value Ref Range & Units Status    Sed Rate 25 0 - 30 mm/h Final         12/27/2017  2:08 PM      Component Results     Component Value Ref Range & Units Status    CRP Inflammation 101.0 (H) 0.0 - 8.0 mg/L Final         12/27/2017  2:08 PM      Component Results     Component Value Ref Range & Units Status    CK Total 68 30 - 225 U/L Final    Specimen slightly hemolyzed         12/27/2017  2:35 PM         12/27/2017  2:35 PM         12/27/2017  2:35 PM         12/27/2017  2:35 PM         12/27/2017  2:35 PM                Clinical Quality Measure: Blood Pressure Screening     Your blood pressure was checked while you were in the emergency department today. The last reading we obtained was  BP: 118/58 . Please read the guidelines below about what these numbers mean and what you should do about them.  If your systolic blood pressure (the top number) is less than 120 and your diastolic blood pressure (the bottom number) is less than 80, then your blood pressure is normal. There is nothing more that you need to do about it.  If your systolic blood pressure (the top number) is 120-139 or your diastolic blood pressure (the bottom number) is 80-89, your blood pressure may be higher than it should be. You should have your blood pressure rechecked within a year by a primary care provider.  If your systolic blood pressure (the top number) is 140 or greater or your diastolic blood pressure (the bottom number) is 90 or  "greater, you may have high blood pressure. High blood pressure is treatable, but if left untreated over time it can put you at risk for heart attack, stroke, or kidney failure. You should have your blood pressure rechecked by a primary care provider within the next 4 weeks.  If your provider in the emergency department today gave you specific instructions to follow-up with your doctor or provider even sooner than that, you should follow that instruction and not wait for up to 4 weeks for your follow-up visit.        Thank you for choosing [facility]       Thank you for choosing [facility] for your care. Our goal is always to provide you with excellent care. Hearing back from our patients is one way we can continue to improve our services. Please take a few minutes to complete the written survey that you may receive in the mail after you visit with us. Thank you!        [facility] Information     [facility] lets you send messages to your doctor, view your test results, renew your prescriptions, schedule appointments and more. To sign up, go to [email or web address] . Click on \"Log in\" on the left side of the screen, which will take you to the Welcome page. Then click on \"Sign up Now\" on the right side of the page.     You will be asked to enter the access code listed below, as well as some personal information. Please follow the directions to create your username and password.     Your access code is: [email or web address]  Expires: 3/24/2018 12:54 AM     Your access code will  in 90 days. If you need help or a new code, please call your [facility] clinic or [phone].        Care EveryWhere ID     This is your Care EveryWhere ID. This could be used by other organizations to access your [facility] medical records  [id number]        Equal Access to Services     LUCAS MEANS : Yogi Holloway, huseyin bruno, bryan segura. So [facility] [phone].    ATENCIÓN: " Si habla jennifer, tiene a laughlin disposición servicios gratuitos de asistencia lingüística. Llame al [phone].    We comply with applicable federal civil rights laws and [place] laws. We do not discriminate on the basis of race, color, national origin, age, disability, sex, sexual orientation, or gender identity.            After Visit Summary       This is your record. Keep this with you and show to your community pharmacist(s) and doctor(s) at your next visit.

## 2017-12-27 NOTE — ED PROVIDER NOTES
History     Chief Complaint:  Leg Pain    HPI   Kaleigh Ziegler is a 62 year old female who presents with her  to the ED for evaluation of bilateral leg pain. The patient was recently seen in the ED on 12/23/17 for the same leg pain. The patient reports her leg pain has been waxing and waning; the pain is a burning sensation. The patient notes she is unable to sleep due to the pain. The patient denies any urinary symptoms, bowel changes, or appetite change. Of note, the patient reports she has a fever and mouth sores.    Chest XR, PA & LAT, 12/23/2017  IMPRESSION: Normal.   As read by Radiology.    MR Lumbar Spine w/o & w/ Contrast, 12/23/2017  IMPRESSION:   1. Multilevel degenerative disc disease.  2. Grade 1 spondylolisthesis L4-L5 caused by degenerative facet  arthropathy.  3. Mild spinal canal stenoses at L2-L3, L3-L4 and L4-L5.  4. No evidence of spinal infection.  As read by Radiology.    Laboratory, 12/23/2017  Influenza A/B: Negative  CRP Inflammation: 14.1(H)   Erythrocyte sed rate: 7  Myoglobin: 155(H)   CK total: 222  CBC: PLT 93(L) o/w WNL (WBC 5.5, HGB 12.9)    CMP: Sodium 128(L), Glucose 110(H), Calcium 7.8(L), Albumin 3.3(L), Protein Total 6.7(L), ALT 57(H), AST 90(H), o/w WNL (Creatinine 0.75)     Allergies:  No known drug allergies    Medications:    The patient is not currently taking any prescribed medications.    Past Medical History:    Mitral valve prolapse    Past Surgical History:    GYN surgery  Tubal ligation    Family History:    History reviewed. No pertinent family history.     Social History:  Smoking status: Never smoker  Alcohol use: No  Presents to ED with    Marital Status:   [2]     Review of Systems   Constitutional: Positive for fever. Negative for appetite change.   HENT: Positive for mouth sores.    Musculoskeletal: Positive for myalgias.   All other systems reviewed and are negative.    Physical Exam     Patient Vitals for the past 24 hrs:   BP Temp  Temp src Pulse Heart Rate Resp SpO2   12/27/17 1125 118/58 99.3  F (37.4  C) Temporal 100 100 16 100 %     Physical Exam  Constitutional: Patient is well appearing. No distress.  Head: Atraumatic.  Mouth/Throat: Oropharynx is clear and moist. No oropharyngeal exudate.  Eyes: Conjunctivae and EOM are normal. No scleral icterus.  Neck: Normal range of motion. Neck supple.   Cardiovascular: Normal rate, regular rhythm, normal heart sounds and intact distal pulses.   Pulmonary/Chest: Breath sounds normal. No respiratory distress.  Abdominal: Soft. Bowel sounds are normal. No distension. No tenderness. No rebound or guarding.   Musculoskeletal: Normal range of motion. No edema or tenderness.   Neurological: Alert and orientated to person, place, and time. No observable focal neuro deficit  Skin: Warm and dry. No rash noted. Not diaphoretic.   Neuro: Alert, oriented x3, PERRL, EOMI, CN 2-7 and 9-12 intact, 5/5 grasp BUE, 5/5 elbow flexion and extension BUE, 5/5 shoulder abduction BUE, 5/5 hip flexion, knee flexion, knee extension, plantar and dorsiflexion BLE, no pronator drift, normal gait, negative romberg, no dysdiadochokinesia, normal finger-nose-finger testing      Emergency Department Course     Laboratory:  CRP inflammation: 101.0(H)  Erythrocyte sed rate: 25  CK total: 68  CBC: o/w WNL (WBC 6.8, HGB 12.6, )   CMP: (L), Glucose 117(H), Calcium 8.4(L), Albumin 3.2(L), (H), (H), o/w WNL (Creatinine 0.64)    Hep C antibody: In process  Hep B surface antigen: In process  Hep B surface gladys immune s: In process  Hep A antibody IgG: In process  HIV antigen antibody combo: In process     Interventions:  1307: Valium 5mg Oral  1308: Ibuprofen 600mg Oral     Emergency Department Course:  Past medical records, nursing notes, and vitals reviewed.  1141: I performed an exam of the patient and obtained history, as documented above.  IV inserted and blood drawn.    1430: I rechecked the patient.  Findings and plan explained to the Patient and spouse. Patient discharged home with instructions regarding supportive care, medications, and reasons to return. The importance of close follow-up was reviewed.     Impression & Plan      Medical Decision Making:  Bilateral leg pain burning in sensation without other systemic disease at this time.  Discussed at length has rather severe DDD at L4, L5.  Additionally mixed bag normal CBC and diff normal CK, CRP elev and AST ALT.  Nonspecific elevation.  Extended workup HIV, Hep panel, lyme.  These are next steps and very specific instructions for outpt follow up of these given through call to PCP by patient to request results.  Additionally no red flags on back or neuro exam and duration for inflammation of vascular of spin with MRI already done very low.      Diagnosis:    ICD-10-CM    1. Thrush B37.0 Hepatitis C antibody     Hepatitis B surface antigen     Hepatitis B surface gladys immune s     Hepatitis A Antibody IgG     HIV Antigen Antibody Combo   2. Bilateral low back pain with sciatica, sciatica laterality unspecified, unspecified chronicity M54.40    3. Neuropathy G62.9      Disposition: Patient discharged to home with      Alexa Page  12/27/2017   [facility] EMERGENCY DEPARTMENT    I, Alexa Page, am serving as a scribe at 11:41 AM on 12/27/2017 to document services personally performed by Andrew Crandall MD based on my observations and the provider's statements to me.        Andrew Crandall MD  12/27/17 0456

## 2017-12-27 NOTE — DISCHARGE INSTRUCTIONS
Possible Causes of Low Back or Leg Pain    The symptoms in your back or leg may be due to pressure on a nerve. This pressure may be caused by a damaged disk or by abnormal bone growth. Either way, you may feel pain, burning, tingling, or numbness. If you have pressure on a nerve that connects to the sciatic nerve, pain may shoot down your leg.    Pressure from the disk  Constant wear and tear can weaken a disk over time and cause back pain. The disk can then be damaged by a sudden movement or injury. If its soft center begins to bulge, the disk may press on a nerve. Or the outside of the disk may tear, and the soft center may squeeze through and pinch a nerve.    Pressure from bone  As a disk wears out, the vertebrae right above and below the disk begin to touch. This can put pressure on a nerve. Often, abnormal bone (called bone spurs) grows where the vertebrae rub against each other. This can cause the foramen or the spinal canal to narrow (called stenosis) and press against a nerve.  Date Last Reviewed: 10/4/2015    3322-7103 [employer]. [address] [place] [address], [place] [address]. All rights reserved. This information is not intended as a substitute for professional medical care. Always follow your healthcare professional's instructions.

## 2017-12-27 NOTE — ED AVS SNAPSHOT
[facility] Emergency Department    [address]    [facility] [address]    Phone:  [phone]    Fax:  [phone]                                       Kaleigh Ziegler   MRN: [id number]    Department:  [facility] Emergency Department   Date of Visit:  12/27/2017           After Visit Summary Signature Page     I have received my discharge instructions, and my questions have been answered. I have discussed any challenges I see with this plan with the nurse or doctor.    ..........................................................................................................................................  Patient/Patient Representative Signature      ..........................................................................................................................................  Patient Representative Print Name and Relationship to Patient    ..................................................               ................................................  Date                                            Time    ..........................................................................................................................................  Reviewed by Signature/Title    ...................................................              ..............................................  Date                                                            Time

## 2017-12-27 NOTE — ED NOTES
Pt in with severe leg pain, states it feels like they are on fire. Was here over the weekend, states she's been taking meds with no relief. ABC's intact, alert and oriented X3.

## 2017-12-28 ENCOUNTER — TELEPHONE (OUTPATIENT)
Dept: EMERGENCY MEDICINE | Facility: CLINIC | Age: 62
End: 2017-12-28

## 2017-12-28 LAB — B BURGDOR IGG+IGM SER QL: 0.02 (ref 0–0.89)

## 2017-12-28 NOTE — LETTER
December 29, 2017        Kaleigh Ziegler  [address] [facility] [address]          Dear Kaleigh Ziegler:    You were seen in the [facility] at [facility] EMERGENCY DEPARTMENT on 12/27/2017.  We are unable to reach you by phone, so we are sending you this letter.     It is important that you call [facility] lab result nurse at [phone] as we have to make some changes in your treatment.     Best time to call back is between 10 a.m. and 6 p.m.      Sincerely,           Chaim Registered nurse  [facility] ED Lab Result RN  [phone]

## 2017-12-28 NOTE — TELEPHONE ENCOUNTER
[facility] Emergency Department Lab result notification:    [place] ED lab result protocol used  Misc: Hepatitis A and B    Reason for call  Notify of lab results, assess symptoms,  review ED providers recommendations/discharge instructions (if necessary) and advise per ED lab result f/u protocol    Lab Result  Hepatitis A Antibody IgG NR^Nonreactive Reactive (A)       Positive result.  As per lab comments, Patient is considered to be immune to infection with Hep B.        Ref Range & Units 1d ago       Hepatitis B Surface Antibody <8.00 m[IU]/mL 203.16 (H)            Information table from ED Provider visit on 12/27/17  Symptoms reported at ED visit (Chief complaint, HPI) Chief Complaint:  Leg Pain     HPI   Kaleigh Ziegler is a 62 year old female who presents with her  to the ED for evaluation of bilateral leg pain. The patient was recently seen in the ED on 12/23/17 for the same leg pain. The patient reports her leg pain has been waxing and waning; the pain is a burning sensation. The patient notes she is unable to sleep due to the pain. The patient denies any urinary symptoms, bowel changes, or appetite change. Of note, the patient reports she has a fever and mouth sores.   ED providers Impression and Plan (applicable information) Medical Decision Making:  Bilateral leg pain burning in sensation without other systemic disease at this time.  Discussed at length has rather severe DDD at L4, L5.  Additionally mixed bag normal CBC and diff normal CK, CRP elev and AST ALT.  Nonspecific elevation.  Extended workup HIV, Hep panel, lyme.  These are next steps and very specific instructions for outpt follow up of these given through call to PCP by patient to request results.  Additionally no red flags on back or neuro exam and duration for inflammation of vascular of spin with MRI already done very low.       Diagnosis:      ICD-10-CM     1. Thrush B37.0 Hepatitis C antibody       Hepatitis B surface  antigen       Hepatitis B surface gladys immune s       Hepatitis A Antibody IgG       HIV Antigen Antibody Combo   2. Bilateral low back pain with sciatica, sciatica laterality unspecified, unspecified chronicity M54.40     3. Neuropathy G62.9        Disposition: Patient discharged to home with       Miscellaneous information   Andrew Crandall MD   Attending         RN Assessment (Patient s current Symptoms), include time called.  [Insert Left message here if message left]  11:45 am Message left to call us back at [phone], between 10 am and 6 pm, seven days a week. May leave a message 24/7, if no one available.     PCP follow-up Questions asked: NO    Evi Cueto RN  [facility] RN  Lung Nodule and ED Lab Result F/u RN  Epic pool (ED late result f/u RN): [id number]  # [phone]

## 2017-12-29 LAB
BACTERIA SPEC CULT: NO GROWTH
Lab: NORMAL
SPECIMEN SOURCE: NORMAL

## 2017-12-29 NOTE — TELEPHONE ENCOUNTER
No return call from Kaleigh.  Letter sent with a  message requesting a call back to [phone] between 10 a.m. and 6:30 p.m., 7 days a week for patient's ED/UC lab results.     Chaim Payton, RN  [facility] RN  Lung Nodule and ED Lab Result F/u RN  Epic pool (ED late result f/u RN): [id number]  [facility] INCIDENTAL RADIOLOGY F/U NURSES: [id number]  # [phone]

## 2017-12-30 LAB
BACTERIA SPEC CULT: NO GROWTH
Lab: NORMAL
SPECIMEN SOURCE: NORMAL

## 2018-01-01 ENCOUNTER — HOSPITAL ENCOUNTER (EMERGENCY)
Facility: CLINIC | Age: 63
Discharge: ANOTHER HOSPITAL | End: 2018-01-01
Attending: EMERGENCY MEDICINE | Admitting: EMERGENCY MEDICINE
Payer: COMMERCIAL

## 2018-01-01 ENCOUNTER — NURSE TRIAGE (OUTPATIENT)
Dept: NURSING | Facility: CLINIC | Age: 63
End: 2018-01-01

## 2018-01-01 ENCOUNTER — HOSPITAL ENCOUNTER (INPATIENT)
Facility: CLINIC | Age: 63
LOS: 6 days | Discharge: NURSING, REHAB OR LONG-TERM CARE | DRG: 095 | End: 2018-01-07
Attending: PSYCHIATRY & NEUROLOGY | Admitting: PSYCHIATRY & NEUROLOGY
Payer: COMMERCIAL

## 2018-01-01 VITALS
DIASTOLIC BLOOD PRESSURE: 70 MMHG | TEMPERATURE: 98.5 F | SYSTOLIC BLOOD PRESSURE: 126 MMHG | RESPIRATION RATE: 18 BRPM | OXYGEN SATURATION: 94 % | HEART RATE: 107 BPM

## 2018-01-01 DIAGNOSIS — G62.9 PERIPHERAL POLYNEUROPATHY: ICD-10-CM

## 2018-01-01 DIAGNOSIS — G61.0: Primary | ICD-10-CM

## 2018-01-01 DIAGNOSIS — E87.1 HYPONATREMIA: ICD-10-CM

## 2018-01-01 PROBLEM — B01.9 VARICELLA: Status: ACTIVE | Noted: 2018-01-01

## 2018-01-01 PROBLEM — Z92.89 H/O MAGNETIC RESONANCE IMAGING OF LUMBAR SPINE: Status: ACTIVE | Noted: 2018-01-01

## 2018-01-01 PROBLEM — M54.9 BACK PAIN: Status: ACTIVE | Noted: 2018-01-01

## 2018-01-01 LAB
ALBUMIN SERPL-MCNC: 3.1 G/DL (ref 3.4–5)
ALP SERPL-CCNC: 58 U/L (ref 40–150)
ALT SERPL W P-5'-P-CCNC: 89 U/L (ref 0–50)
ANION GAP SERPL CALCULATED.3IONS-SCNC: 2 MMOL/L (ref 3–14)
APPEARANCE CSF: CLEAR
AST SERPL W P-5'-P-CCNC: 45 U/L (ref 0–45)
BASOPHILS # BLD AUTO: 0.1 10E9/L (ref 0–0.2)
BASOPHILS NFR BLD AUTO: 0.7 %
BASOPHILS NFR CSF MANUAL: 3 %
BILIRUB SERPL-MCNC: 0.8 MG/DL (ref 0.2–1.3)
BUN SERPL-MCNC: 19 MG/DL (ref 7–30)
CALCIUM SERPL-MCNC: 8.4 MG/DL (ref 8.5–10.1)
CHLORIDE SERPL-SCNC: 94 MMOL/L (ref 94–109)
CO2 SERPL-SCNC: 32 MMOL/L (ref 20–32)
COLOR CSF: COLORLESS
CREAT SERPL-MCNC: 0.6 MG/DL (ref 0.52–1.04)
CRP SERPL-MCNC: 13.2 MG/L (ref 0–8)
DIFFERENTIAL METHOD BLD: ABNORMAL
EOSINOPHIL # BLD AUTO: 0 10E9/L (ref 0–0.7)
EOSINOPHIL NFR BLD AUTO: 0.1 %
ERYTHROCYTE [DISTWIDTH] IN BLOOD BY AUTOMATED COUNT: 13.1 % (ref 10–15)
ERYTHROCYTE [SEDIMENTATION RATE] IN BLOOD BY WESTERGREN METHOD: 4 MM/H (ref 0–30)
GFR SERPL CREATININE-BSD FRML MDRD: >90 ML/MIN/1.7M2
GLUCOSE CSF-MCNC: 63 MG/DL (ref 40–70)
GLUCOSE SERPL-MCNC: 103 MG/DL (ref 70–99)
GRAM STN SPEC: NORMAL
GRAM STN SPEC: NORMAL
HCT VFR BLD AUTO: 40.2 % (ref 35–47)
HGB BLD-MCNC: 13.6 G/DL (ref 11.7–15.7)
IMM GRANULOCYTES # BLD: 0.2 10E9/L (ref 0–0.4)
IMM GRANULOCYTES NFR BLD: 1.4 %
LYMPH ABN NFR CSF MANUAL: 82 %
LYMPHOCYTES # BLD AUTO: 1 10E9/L (ref 0.8–5.3)
LYMPHOCYTES NFR BLD AUTO: 8.3 %
MCH RBC QN AUTO: 31.6 PG (ref 26.5–33)
MCHC RBC AUTO-ENTMCNC: 33.8 G/DL (ref 31.5–36.5)
MCV RBC AUTO: 93 FL (ref 78–100)
MONOCYTES # BLD AUTO: 1.4 10E9/L (ref 0–1.3)
MONOCYTES NFR BLD AUTO: 11.4 %
MONOS+MACROS NFR CSF MANUAL: 12 %
NEUTROPHILS # BLD AUTO: 9.6 10E9/L (ref 1.6–8.3)
NEUTROPHILS NFR BLD AUTO: 78.1 %
NEUTROPHILS NFR CSF MANUAL: 2 %
NRBC # BLD AUTO: 0 10*3/UL
NRBC BLD AUTO-RTO: 0 /100
OTHER CELLS CSF: 1 %
PLATELET # BLD AUTO: 431 10E9/L (ref 150–450)
POTASSIUM SERPL-SCNC: 3.8 MMOL/L (ref 3.4–5.3)
PROT CSF-MCNC: 140 MG/DL (ref 15–60)
PROT SERPL-MCNC: 7.2 G/DL (ref 6.8–8.8)
RBC # BLD AUTO: 4.31 10E12/L (ref 3.8–5.2)
RBC # CSF MANUAL: 1 /UL (ref 0–2)
SODIUM SERPL-SCNC: 128 MMOL/L (ref 133–144)
SPECIMEN SOURCE: NORMAL
TUBE # CSF: 4 #
WBC # BLD AUTO: 12.3 10E9/L (ref 4–11)
WBC # CSF MANUAL: 14 /UL (ref 0–5)

## 2018-01-01 PROCEDURE — 85049 AUTOMATED PLATELET COUNT: CPT | Performed by: PSYCHIATRY & NEUROLOGY

## 2018-01-01 PROCEDURE — 82565 ASSAY OF CREATININE: CPT | Performed by: PSYCHIATRY & NEUROLOGY

## 2018-01-01 PROCEDURE — 99285 EMERGENCY DEPT VISIT HI MDM: CPT | Mod: 25

## 2018-01-01 PROCEDURE — 84425 ASSAY OF VITAMIN B-1: CPT | Performed by: PSYCHIATRY & NEUROLOGY

## 2018-01-01 PROCEDURE — 86787 VARICELLA-ZOSTER ANTIBODY: CPT | Performed by: PSYCHIATRY & NEUROLOGY

## 2018-01-01 PROCEDURE — 82945 GLUCOSE OTHER FLUID: CPT | Performed by: EMERGENCY MEDICINE

## 2018-01-01 PROCEDURE — 25000128 H RX IP 250 OP 636: Performed by: EMERGENCY MEDICINE

## 2018-01-01 PROCEDURE — 96374 THER/PROPH/DIAG INJ IV PUSH: CPT | Mod: 59

## 2018-01-01 PROCEDURE — 86235 NUCLEAR ANTIGEN ANTIBODY: CPT | Performed by: PSYCHIATRY & NEUROLOGY

## 2018-01-01 PROCEDURE — 86140 C-REACTIVE PROTEIN: CPT | Performed by: EMERGENCY MEDICINE

## 2018-01-01 PROCEDURE — 12000008 ZZH R&B INTERMEDIATE UMMC

## 2018-01-01 PROCEDURE — 80053 COMPREHEN METABOLIC PANEL: CPT | Performed by: EMERGENCY MEDICINE

## 2018-01-01 PROCEDURE — 89051 BODY FLUID CELL COUNT: CPT | Performed by: EMERGENCY MEDICINE

## 2018-01-01 PROCEDURE — 36415 COLL VENOUS BLD VENIPUNCTURE: CPT | Performed by: PSYCHIATRY & NEUROLOGY

## 2018-01-01 PROCEDURE — 82784 ASSAY IGA/IGD/IGG/IGM EACH: CPT | Performed by: PSYCHIATRY & NEUROLOGY

## 2018-01-01 PROCEDURE — 85652 RBC SED RATE AUTOMATED: CPT | Performed by: EMERGENCY MEDICINE

## 2018-01-01 PROCEDURE — 86663 EPSTEIN-BARR ANTIBODY: CPT | Performed by: PSYCHIATRY & NEUROLOGY

## 2018-01-01 PROCEDURE — 62270 DX LMBR SPI PNXR: CPT

## 2018-01-01 PROCEDURE — 84157 ASSAY OF PROTEIN OTHER: CPT | Performed by: EMERGENCY MEDICINE

## 2018-01-01 PROCEDURE — 86665 EPSTEIN-BARR CAPSID VCA: CPT | Performed by: PSYCHIATRY & NEUROLOGY

## 2018-01-01 PROCEDURE — 84207 ASSAY OF VITAMIN B-6: CPT | Performed by: PSYCHIATRY & NEUROLOGY

## 2018-01-01 PROCEDURE — 87205 SMEAR GRAM STAIN: CPT | Performed by: EMERGENCY MEDICINE

## 2018-01-01 PROCEDURE — 85025 COMPLETE CBC W/AUTO DIFF WBC: CPT | Performed by: EMERGENCY MEDICINE

## 2018-01-01 PROCEDURE — 87070 CULTURE OTHR SPECIMN AEROBIC: CPT | Performed by: EMERGENCY MEDICINE

## 2018-01-01 RX ORDER — ACETAMINOPHEN 325 MG/1
650 TABLET ORAL ONCE
Status: DISCONTINUED | OUTPATIENT
Start: 2018-01-01 | End: 2018-01-02

## 2018-01-01 RX ORDER — DIPHENHYDRAMINE HCL 25 MG
50 CAPSULE ORAL ONCE
Status: DISCONTINUED | OUTPATIENT
Start: 2018-01-01 | End: 2018-01-02 | Stop reason: DRUGHIGH

## 2018-01-01 RX ORDER — ONDANSETRON 4 MG/1
4 TABLET, ORALLY DISINTEGRATING ORAL EVERY 6 HOURS PRN
Status: DISCONTINUED | OUTPATIENT
Start: 2018-01-01 | End: 2018-01-07 | Stop reason: HOSPADM

## 2018-01-01 RX ORDER — POTASSIUM CHLORIDE 1.5 G/1.58G
20-40 POWDER, FOR SOLUTION ORAL
Status: DISCONTINUED | OUTPATIENT
Start: 2018-01-01 | End: 2018-01-07 | Stop reason: HOSPADM

## 2018-01-01 RX ORDER — LIDOCAINE HYDROCHLORIDE 10 MG/ML
INJECTION, SOLUTION EPIDURAL; INFILTRATION; INTRACAUDAL; PERINEURAL
Status: DISCONTINUED
Start: 2018-01-01 | End: 2018-01-01 | Stop reason: HOSPADM

## 2018-01-01 RX ORDER — POTASSIUM CHLORIDE 750 MG/1
20-40 TABLET, EXTENDED RELEASE ORAL
Status: DISCONTINUED | OUTPATIENT
Start: 2018-01-01 | End: 2018-01-07 | Stop reason: HOSPADM

## 2018-01-01 RX ORDER — MORPHINE SULFATE 4 MG/ML
4 INJECTION, SOLUTION INTRAMUSCULAR; INTRAVENOUS ONCE
Status: COMPLETED | OUTPATIENT
Start: 2018-01-01 | End: 2018-01-01

## 2018-01-01 RX ORDER — POTASSIUM CHLORIDE 7.45 MG/ML
10 INJECTION INTRAVENOUS
Status: DISCONTINUED | OUTPATIENT
Start: 2018-01-01 | End: 2018-01-07 | Stop reason: HOSPADM

## 2018-01-01 RX ORDER — DIPHENHYDRAMINE HYDROCHLORIDE 50 MG/ML
50 INJECTION INTRAMUSCULAR; INTRAVENOUS ONCE
Status: DISCONTINUED | OUTPATIENT
Start: 2018-01-01 | End: 2018-01-02 | Stop reason: DRUGHIGH

## 2018-01-01 RX ORDER — POTASSIUM CL/LIDO/0.9 % NACL 10MEQ/0.1L
10 INTRAVENOUS SOLUTION, PIGGYBACK (ML) INTRAVENOUS
Status: DISCONTINUED | OUTPATIENT
Start: 2018-01-01 | End: 2018-01-07 | Stop reason: HOSPADM

## 2018-01-01 RX ORDER — ACETAMINOPHEN 325 MG/1
650 TABLET ORAL
Status: DISCONTINUED | OUTPATIENT
Start: 2018-01-01 | End: 2018-01-06

## 2018-01-01 RX ORDER — SODIUM CHLORIDE 9 MG/ML
INJECTION, SOLUTION INTRAVENOUS CONTINUOUS
Status: DISCONTINUED | OUTPATIENT
Start: 2018-01-01 | End: 2018-01-04

## 2018-01-01 RX ORDER — DIPHENHYDRAMINE HCL 25 MG
50 CAPSULE ORAL
Status: DISCONTINUED | OUTPATIENT
Start: 2018-01-01 | End: 2018-01-06

## 2018-01-01 RX ORDER — ACETAMINOPHEN 325 MG/1
650 TABLET ORAL EVERY 4 HOURS PRN
Status: DISCONTINUED | OUTPATIENT
Start: 2018-01-01 | End: 2018-01-07 | Stop reason: HOSPADM

## 2018-01-01 RX ORDER — AMOXICILLIN 250 MG
1 CAPSULE ORAL 2 TIMES DAILY PRN
Status: DISCONTINUED | OUTPATIENT
Start: 2018-01-01 | End: 2018-01-07 | Stop reason: HOSPADM

## 2018-01-01 RX ORDER — METHYLPREDNISOLONE SODIUM SUCCINATE 125 MG/2ML
125 INJECTION, POWDER, LYOPHILIZED, FOR SOLUTION INTRAMUSCULAR; INTRAVENOUS
Status: DISCONTINUED | OUTPATIENT
Start: 2018-01-01 | End: 2018-01-06

## 2018-01-01 RX ORDER — TRAMADOL HYDROCHLORIDE 50 MG/1
50 TABLET ORAL EVERY 6 HOURS PRN
Status: DISCONTINUED | OUTPATIENT
Start: 2018-01-01 | End: 2018-01-07 | Stop reason: HOSPADM

## 2018-01-01 RX ORDER — ONDANSETRON 2 MG/ML
4 INJECTION INTRAMUSCULAR; INTRAVENOUS EVERY 6 HOURS PRN
Status: DISCONTINUED | OUTPATIENT
Start: 2018-01-01 | End: 2018-01-07 | Stop reason: HOSPADM

## 2018-01-01 RX ORDER — POTASSIUM CHLORIDE 29.8 MG/ML
20 INJECTION INTRAVENOUS
Status: DISCONTINUED | OUTPATIENT
Start: 2018-01-01 | End: 2018-01-07 | Stop reason: HOSPADM

## 2018-01-01 RX ORDER — MAGNESIUM SULFATE HEPTAHYDRATE 40 MG/ML
4 INJECTION, SOLUTION INTRAVENOUS EVERY 4 HOURS PRN
Status: DISCONTINUED | OUTPATIENT
Start: 2018-01-01 | End: 2018-01-07 | Stop reason: HOSPADM

## 2018-01-01 RX ORDER — DIPHENHYDRAMINE HYDROCHLORIDE 50 MG/ML
50 INJECTION INTRAMUSCULAR; INTRAVENOUS
Status: DISCONTINUED | OUTPATIENT
Start: 2018-01-01 | End: 2018-01-06

## 2018-01-01 RX ORDER — NALOXONE HYDROCHLORIDE 0.4 MG/ML
.1-.4 INJECTION, SOLUTION INTRAMUSCULAR; INTRAVENOUS; SUBCUTANEOUS
Status: DISCONTINUED | OUTPATIENT
Start: 2018-01-01 | End: 2018-01-07 | Stop reason: HOSPADM

## 2018-01-01 RX ORDER — AMOXICILLIN 250 MG
2 CAPSULE ORAL 2 TIMES DAILY PRN
Status: DISCONTINUED | OUTPATIENT
Start: 2018-01-01 | End: 2018-01-07 | Stop reason: HOSPADM

## 2018-01-01 RX ADMIN — MORPHINE SULFATE 4 MG: 4 INJECTION INTRAVENOUS at 19:36

## 2018-01-01 ASSESSMENT — ENCOUNTER SYMPTOMS
APPETITE CHANGE: 1
BACK PAIN: 1
UNEXPECTED WEIGHT CHANGE: 1
WEAKNESS: 1
NUMBNESS: 1

## 2018-01-01 NOTE — TELEPHONE ENCOUNTER
"  Reason for Disposition    Unable to walk    Additional Information    Negative: Passed out (i.e., lost consciousness, collapsed and was not responding)    Negative: Shock suspected (e.g., cold/pale/clammy skin, too weak to stand, low BP, rapid pulse)    Negative: Sounds like a life-threatening emergency to the triager    Negative: Major injury to the back (e.g., MVA, fall > 10 feet or 3 meters, penetrating injury, etc.)    Negative: Followed a tailbone injury    Negative: [1] Pain in the upper back over the ribs (rib cage) AND [2] radiates (travels, goes) into chest    Negative: [1] Pain in the upper back over the ribs (rib cage) AND [2] worsened by coughing (or clearly increases with breathing)    Negative: Back pain during pregnancy    Negative: Pain mainly in flank (i.e., in the side, over the lower ribs or just below the ribs)    Negative: [1] SEVERE back pain (e.g., excruciating) AND [2] sudden onset AND [3] age > 60    Negative: [1] Unable to urinate (or only a few drops) > 4 hours AND     [2] bladder feels very full (e.g., palpable bladder or strong urge to urinate)    Negative: [1] Urinary or bowel incontinence (i.e., loss of bladder or bowel control) AND [2] new onset    Negative: Numbness in groin or rectal area (i.e., loss of sensation)    Negative: Weakness of a leg or foot (e.g., unable to bear weight, dragging foot)    Negative: [1] Abdominal pain AND [2] age > 60    Negative: [1] SEVERE abdominal pain AND [2] present > 1 hour    Answer Assessment - Initial Assessment Questions  1. ONSET: \"When did the pain begin?\"       A week ago  2. LOCATION: \"Where does it hurt?\" (upper, mid or lower back)      Low back and legs  3. SEVERITY: \"How bad is the pain?\"  (e.g., Scale 1-10; mild, moderate, or severe)    - MILD (1-3): doesn't interfere with normal activities     - MODERATE (4-7): interferes with normal activities or awakens from sleep     - SEVERE (8-10): excruciating pain, unable to do any normal " "activities       severe  4. PATTERN: \"Is the pain constant?\" (e.g., yes, no; constant, intermittent)       constant  5. RADIATION: \"Does the pain shoot into your legs or elsewhere?\"      Back to legs  6. CAUSE:  \"What do you think is causing the back pain?\"        Not sure  7. BACK OVERUSE:  \"Any recent lifting of heavy objects, strenuous work or exercise?\"      no  8. MEDICATIONS: \"What have you taken so far for the pain?\" (e.g., nothing, acetaminophen, NSAIDS)      gabapentin  9. NEUROLOGIC SYMPTOMS: \"Do you have any weakness, numbness, or problems with bowel/bladder control?\"      Weakness, numbness of legs  10. OTHER SYMPTOMS: \"Do you have any other symptoms?\" (e.g., fever, abdominal pain, burning with urination, blood in urine)        no  11. PREGNANCY: \"Is there any chance you are pregnant?\" (e.g., yes, no; LMP)        no    Protocols used: BACK PAIN-ADULT-AH    "

## 2018-01-01 NOTE — ED NOTES
Patient comes in for evaluation of worsening back pain. Patient has been seen twice for symptoms here starting 12/23 and have been getting worse. Patient states that now she cannot bear weight on legs and cannot care for herself at home right now. Would like to see a neurologist and get a definitive diagnosis. ABCs intact. Tramadol at home helps with the pain but not mobility, last dose 2 hours PTA.

## 2018-01-01 NOTE — ED PROVIDER NOTES
History     Chief Complaint:  Back Pain and decreased mobility    HPI   Kaleigh Ziegler is a 62 year old female who presents with back pain and decreased mobility. The patient states that for the last 10 days she has been experiencing progressively worsening back pain which started after a viral illness. Of note, the patient has been seen her on 12/23 and 12/27 for the same complaint. She describes the pain as a burning sensation down the back of legs with associated numbness from her knees to her toes and shooting pain into her heels.  Over the last several days, she has had increased weakness in the lower extremities and has been using a walker.  Today, she developed increased weakness in bilateral legs, and can not walk even with a walker.  She also notes decrease in appetite and weight loss. She states that she would like to see a neurologist for a definitive diagnosis. She has been taking Tramadol at home, last dose at 1130, which helps with the pain but not mobility. Patient denies weakness in her upper body, numbness in her groin, and foreign travel. Patient denies all other complaints. She denies shortness of breath.    Allergies:  No known drug allergies      Medications:    Ultram     Past Medical History:    Mitral Valve Prolapse    Past Surgical History:    Gyn Surgery   Laparoscopic Tubal Ligation    Family History:    History reviewed. No pertinent family history.      Social History:  Presents with   and brother  Tobacco use: Never  Alcohol use: No  PCP: Heaven Rose MD    Marital Status:        Review of Systems   Constitutional: Positive for appetite change and unexpected weight change.   Musculoskeletal: Positive for back pain.        Calf pain   Neurological: Positive for weakness and numbness.   All other systems reviewed and are negative.    Physical Exam     Patient Vitals for the past 24 hrs:   BP Temp Temp src Pulse Resp SpO2   01/01/18 1945 133/84 - - - - 95 %    01/01/18 1935 - - - - - 96 %   01/01/18 1930 138/84 - - - - -   01/01/18 1700 126/79 - - - - 97 %   01/01/18 1645 131/80 - - - - 95 %   01/01/18 1630 127/80 - - - - 96 %   01/01/18 1615 121/76 - - - - 97 %   01/01/18 1600 122/83 - - - - 97 %   01/01/18 1322 105/70 98.5  F (36.9  C) Oral 107 18 97 %      Physical Exam  Gen: Pleasant, appears stated age.    Eye:   Pupils are equal, round, and reactive.     Sclera non-injected.    ENT:   Moist mucus membranes.     Normal tongue.    Oropharynx without lesions.    Cardiac:     Normal rate and regular rhythm.    No murmurs, gallops, or rubs.    Pulmonary:     Clear to auscultation bilaterally.    No wheezes, rales, or rhonchi.    Abdomen:     Normal active bowel sounds.     Abdomen is soft and non-distended, without focal tenderness.    Musculoskeletal:     Normal movement of all extremities without evidence for deficit.   FROM in bilateral hips and knees.    Extremities:    No edema.    Skin:   Warm and dry.    Neurologic:    Alert, normal cognition.   Strength - BUE 5/5 triceps, biceps, wrist flexion and extension, finger abduction.  Sensation - Fine touch and pain  sensation intact in all dermatomes. 2+ bilateral brachialis and brachioradialis reflexes     5/5 strength in hip flexors.  4/5 in knee extensors.  2/5 dorsiflexion, plantarflexion, FHL. 0/5 EHL.   Fine touch sensation intact throughout bilateral lower extremities.   Decreased position sensation in BLE.   No patellar or achilles reflex bilaterally.    Psychiatric:     Normal affect with appropriate interaction with examiner.     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     Timeout:  Universal protocol was followed. TIME OUT conducted just prior to starting procedure confirmed patient identity, site/side, procedure, patient position, and availability of correct equipment and implants? Yes      Medication:  Lidocaine: SQ     Procedure Note:  Patient was placed in a left lateral decubitus position.  The low back was prepped with Betadine.  The patient was medicated as above.  A spinal needle was used to gain access to the subarachnoid space with stylet in place. Opening pressure was 22 cm H2O.  The fluid was clear.  Stylet was replaced and needle withdrawn.     Patient Status:  Patient tolerated the procedure well.  There were no complications.    Interventions:  1936: Morphine 4 mg IV     The patient's symptoms were partially improved with parenteral narcotics.    Emergency Department Course:  Past medical records, nursing notes, and vitals reviewed.  1515: I performed an exam of the patient and obtained history, as documented above.  IV inserted and blood drawn.   Above interventions provided.   I personally reviewed the laboratory results with the Patient and spouse and brother and answered all related questions prior to transfer.  Findings and plan explained to the Patient and spouse and brother.  1943: Patient will be transferred to the [facility] via EMS. Discussed the case with Dr. Ugalde, from neurology, who will admit the patient to a monitored bed for further monitoring, evaluation, and treatment.      Impression & Plan    Medical Decision Making:  Kaleigh Ziegler  is a 62 year old, otherwise healthy, female who presents for worsening leg pain and now new onset leg weakness. On exam the patient has absent patellar and achilles reflexes. She has weakness in the lower extremities but hip flexors are spared. She has not had any shortness of breath or dyspnea. 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 Spinal tap seems to support the diagnosis. In addition, previous work up demonstrates unremarkable lumbar spine MRI. I discussed the case with Dr. Ugalde, neurologist at the [facility]. At this point, given that the patient has no dyspnea or other respiratory difficulty, we feel that she is stable to be transferred to the floor. I believe that she requires transfer because of the possibility of IVIG, plasmapheresis, and intensive neurological and multidisciplinary team involvement. The patient agrees with this plan.     Diagnosis:    ICD-10-CM    1. Peripheral polyneuropathy G62.9        Disposition:  Transferred to the [facility].      1/1/2018   [facility] EMERGENCY DEPARTMENT  I, Adilia Singh, am serving as a scribe at 3:15 PM on 1/1/2018 to document services personally performed by Jaqueline Hernandez MD based on my observations and the provider's statements to me.       Jaqueline Hernandez MD  01/02/18 0025

## 2018-01-01 NOTE — TELEPHONE ENCOUNTER
"Patient calling because she is experiencing severe pain and inability to walk due to pain. She is thinking about returning to ER but does not want to \"be sent home yet again\" after two recent visits for same symptoms. She feels that the MD should get to the bottom of her problem and not sent her home until it is solved.  I advised that I am unable to tell her what the ED provider will determine about her care. Verbalized understanding and said she may try to contract her own [facility] PCP to see if they can help her more.  Quita العلي RN  [facility]    "

## 2018-01-02 LAB
CREAT SERPL-MCNC: 0.59 MG/DL (ref 0.52–1.04)
EBV EA-D IGG SER-ACNC: 0.3 AI (ref 0–0.8)
EBV VCA IGG SER QL IA: >8 AI (ref 0–0.8)
EBV VCA IGM SER QL IA: <0.2 AI (ref 0–0.8)
ENA SS-A IGG SER IA-ACNC: <0.2 AI (ref 0–0.9)
ENA SS-B IGG SER IA-ACNC: <0.2 AI (ref 0–0.9)
GFR SERPL CREATININE-BSD FRML MDRD: >90 ML/MIN/1.7M2
IGA SERPL-MCNC: 172 MG/DL (ref 70–380)
LACTATE BLD-SCNC: 1.5 MMOL/L (ref 0.7–2)
PLATELET # BLD AUTO: 311 10E9/L (ref 150–450)
VZV IGG SER QL IA: 4.9 AI (ref 0–0.8)

## 2018-01-02 PROCEDURE — 12000008 ZZH R&B INTERMEDIATE UMMC

## 2018-01-02 PROCEDURE — 25000132 ZZH RX MED GY IP 250 OP 250 PS 637: Performed by: PSYCHIATRY & NEUROLOGY

## 2018-01-02 PROCEDURE — 83605 ASSAY OF LACTIC ACID: CPT | Performed by: PSYCHIATRY & NEUROLOGY

## 2018-01-02 PROCEDURE — 40000275 ZZH STATISTIC RCP TIME EA 10 MIN

## 2018-01-02 PROCEDURE — 25000128 H RX IP 250 OP 636: Performed by: PSYCHIATRY & NEUROLOGY

## 2018-01-02 PROCEDURE — 30233S1 TRANSFUSION OF NONAUTOLOGOUS GLOBULIN INTO PERIPHERAL VEIN, PERCUTANEOUS APPROACH: ICD-10-PCS | Performed by: PSYCHIATRY & NEUROLOGY

## 2018-01-02 PROCEDURE — 36415 COLL VENOUS BLD VENIPUNCTURE: CPT | Performed by: PSYCHIATRY & NEUROLOGY

## 2018-01-02 PROCEDURE — 86255 FLUORESCENT ANTIBODY SCREEN: CPT | Performed by: PSYCHIATRY & NEUROLOGY

## 2018-01-02 PROCEDURE — 25000132 ZZH RX MED GY IP 250 OP 250 PS 637: Performed by: STUDENT IN AN ORGANIZED HEALTH CARE EDUCATION/TRAINING PROGRAM

## 2018-01-02 PROCEDURE — 83520 IMMUNOASSAY QUANT NOS NONAB: CPT | Performed by: PSYCHIATRY & NEUROLOGY

## 2018-01-02 PROCEDURE — 25000128 H RX IP 250 OP 636: Performed by: STUDENT IN AN ORGANIZED HEALTH CARE EDUCATION/TRAINING PROGRAM

## 2018-01-02 PROCEDURE — 83519 RIA NONANTIBODY: CPT | Performed by: PSYCHIATRY & NEUROLOGY

## 2018-01-02 PROCEDURE — 94150 VITAL CAPACITY TEST: CPT

## 2018-01-02 RX ORDER — SENNOSIDES 8.6 MG
8.6 TABLET ORAL 2 TIMES DAILY PRN
Status: DISCONTINUED | OUTPATIENT
Start: 2018-01-02 | End: 2018-01-02

## 2018-01-02 RX ORDER — ACETAMINOPHEN 325 MG/1
650 TABLET ORAL EVERY 24 HOURS
Status: COMPLETED | OUTPATIENT
Start: 2018-01-02 | End: 2018-01-05

## 2018-01-02 RX ORDER — DIPHENHYDRAMINE HYDROCHLORIDE 50 MG/ML
50 INJECTION INTRAMUSCULAR; INTRAVENOUS EVERY 24 HOURS
Status: DISCONTINUED | OUTPATIENT
Start: 2018-01-03 | End: 2018-01-06

## 2018-01-02 RX ORDER — DIPHENHYDRAMINE HCL 25 MG
50 CAPSULE ORAL EVERY 24 HOURS
Status: DISCONTINUED | OUTPATIENT
Start: 2018-01-03 | End: 2018-01-06

## 2018-01-02 RX ORDER — GABAPENTIN 300 MG/1
300 CAPSULE ORAL 3 TIMES DAILY
Status: DISCONTINUED | OUTPATIENT
Start: 2018-01-02 | End: 2018-01-03

## 2018-01-02 RX ADMIN — ACETAMINOPHEN 650 MG: 325 TABLET, FILM COATED ORAL at 00:55

## 2018-01-02 RX ADMIN — ACETAMINOPHEN 650 MG: 325 TABLET, FILM COATED ORAL at 19:40

## 2018-01-02 RX ADMIN — ENOXAPARIN SODIUM 40 MG: 40 INJECTION SUBCUTANEOUS at 09:38

## 2018-01-02 RX ADMIN — HUMAN IMMUNOGLOBULIN G 25000 MG: 20 LIQUID INTRAVENOUS at 01:59

## 2018-01-02 RX ADMIN — TRAMADOL HYDROCHLORIDE 50 MG: 50 TABLET, COATED ORAL at 16:52

## 2018-01-02 RX ADMIN — SODIUM CHLORIDE: 9 INJECTION, SOLUTION INTRAVENOUS at 04:40

## 2018-01-02 RX ADMIN — DIPHENHYDRAMINE HYDROCHLORIDE 50 MG: 25 CAPSULE ORAL at 00:54

## 2018-01-02 RX ADMIN — ACETAMINOPHEN 650 MG: 325 TABLET, FILM COATED ORAL at 08:05

## 2018-01-02 RX ADMIN — TRAMADOL HYDROCHLORIDE 50 MG: 50 TABLET, COATED ORAL at 03:34

## 2018-01-02 RX ADMIN — SODIUM CHLORIDE: 9 INJECTION, SOLUTION INTRAVENOUS at 18:37

## 2018-01-02 RX ADMIN — SENNOSIDES AND DOCUSATE SODIUM 2 TABLET: 8.6; 5 TABLET ORAL at 09:36

## 2018-01-02 RX ADMIN — GABAPENTIN 300 MG: 300 CAPSULE ORAL at 09:36

## 2018-01-02 RX ADMIN — GABAPENTIN 300 MG: 300 CAPSULE ORAL at 19:40

## 2018-01-02 RX ADMIN — GABAPENTIN 300 MG: 300 CAPSULE ORAL at 14:33

## 2018-01-02 ASSESSMENT — PAIN DESCRIPTION - DESCRIPTORS: DESCRIPTORS: BURNING

## 2018-01-02 NOTE — PLAN OF CARE
Problem: Pain, Acute (Adult)  Goal: Identify Related Risk Factors and Signs and Symptoms  Related risk factors and signs and symptoms are identified upon initiation of Human Response Clinical Practice Guideline (CPG).  Pt admitted from [facility] at 2200. VSS. A&O x4. Neuro: BLE N/T and weakness (4/5), unable to stand per pt d/t sharp pain and weakness. Voiding via bedpan per report; commode ordered per pt request. PIV SL. Not OOB yet. Pt c/o burning pain to bilateral MD chris notified and awaiting orders. Continue to monitor and follow POC.

## 2018-01-02 NOTE — PLAN OF CARE
Problem: Pain, Acute (Adult)  Goal: Identify Related Risk Factors and Signs and Symptoms  Related risk factors and signs and symptoms are identified upon initiation of Human Response Clinical Practice Guideline (CPG).   Outcome: No Change  Pt admitted w/ possible GBS. VSS. Pt c/o pain in legs (burning feeling), relieved w/ Tylenol and Gabapentin. Neuros numbness of BLE from above knees to toes. PIV infusing NS at 75mL/hr. Gluten free/vegan diet. Voiding via bedpan. Pt had first dose of IVIG overnight, tolerated well. Up w/ A2, mechanical lift. Continue to monitor and follow POC.

## 2018-01-02 NOTE — H&P
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 Patient says her symptoms began in mid-December and began with BL posterior severe calf pain that later involved her posterior thigh and low back. A day or two later she noted some numbness of the feet that progressed over a few days up to the ankles and then stopped progressing proximally. The pain became worse over time and eventually limited her ambulation. She says her legs became weak after the numbness set in but she is unsure of exactly when the weakness came on d/t her being limited in her ambulation as it made her pain worse. She says the weakness and pain are about equally responsible for her difficulty ambulating. She visited the ED a couple of times and was sent home without answer. She began using a walker until she presented again to the ED today, after which she was transferred here for further evaluation. She thinks she has not yet hit a plateau in regards to her symptoms, and they continue to progress. She thinks her symptoms were preceded by two weeks by a viral illness that gave her chills. Denies recent diarrheal illness. Never had these symptoms before. Pt endorses weight loss beginning after symptom onset. Never smoked. Was previously ambulating normally at baseline prior to onset of symptoms. Is a doctor of education.     In the ED, she was found to have BL LE weakness as well as dropped BL leg reflexes. A lumbar MRI with contrast showed no enhancement or explanation. She had an LP showing c14 (lympho predominant), p140, g63. She also had a mildly high CRP at 13.2. AST/ALT were both elevated to the 100s on the  that has been  resolving. HIV, Lyme, CK negative. Sodium 128.     ROS: A 10-point ROS is negative unless indicated above.      Allergies:  No Known Allergies    Medications:    Prescriptions Prior to Admission   Medication Sig Dispense Refill Last Dose     GABAPENTIN PO    1/1/2018 at 0600     traMADol (ULTRAM) 50 MG tablet Take 1 tablet (50 mg) by mouth every 6 hours as needed for pain 10 tablet 0 1/1/2018 at 1100       PMH:  Past Medical History:   Diagnosis Date     Back pain 1/1/2018     H/O magnetic resonance imaging of lumbar spine 1/1/2018    MRI LUMBAR SPINE WITHOUT AND WITH CONTRAST 12/24/2017 12:02 AM    HISTORY: Bilateral leg pain and back pain. Fever. Rule out spine infection. Pain started months ago, but is worse today.   TECHNIQUE: Multiplanar multisequence MRI of the lumbar spine without and with 7 mL Gadavist.   COMPARISON: None.   FINDINGS: The report is dictated assuming five lumbar-type vertebral bodies, and radiographic co     H/O mitral valve prolapse      Past Surgical History:   Procedure Laterality Date     GYN SURGERY       LAPAROSCOPIC TUBAL LIGATION         Social History:  Social History   Substance Use Topics     Smoking status: Never Smoker     Smokeless tobacco: Never Used     Alcohol use No       Family History:    No family history on file.    Physical Examination:   Vitals:  B/P: 112/58, T: 99.2, P: 96, R: 16  General:  Laying in the bed  HEENT:  NC/AT, no icterus, op pink and moist, no ear or nose drainage.   Cardiac:  RRR, no m/r/g  Chest:  CTAB  Abdomen:  S/NT/ND  Extremities:  No LE swelling.    Skin:  No rash or lesion.      Neuro Exam:  Mental status: AOx4, speech wnl  Cranial nerves: EOMI, VFF, smile symmetric, equal to LT, pupils equal  Motor: 5/5 in UEs. 4-/5 in BL hip flexors. 3/5 in BL plantar and dorsiflexion. Normal tone.   Reflexes: absent in BL LEs, mildly brisk in BL UEs. Neg gonzalez's. Toes mute.   Sensory: unable to discern pinprick from light touch in feet, intact in UEs.  Impaired BL proprioception. Absent vibration up to knees.   Coordination: FTN intact BL  Gait: defer    Investigations:  Data     CMP   Recent Labs  Lab 01/01/18  1600 12/27/17  1320   * 132*   POTASSIUM 3.8 4.5   CHLORIDE 94 98   CO2 32 28   ANIONGAP 2* 6   * 117*   BUN 19 12   CR 0.60 0.64   GFRESTIMATED >90 >90   GFRESTBLACK >90 >90   SUNIL 8.4* 8.4*   PROTTOTAL 7.2 7.4   ALBUMIN 3.1* 3.2*   BILITOTAL 0.8 0.9   ALKPHOS 58 60   AST 45 146*   ALT 89* 169*        CBC   Recent Labs  Lab 01/01/18  1600 12/27/17  1320   WBC 12.3* 6.8   RBC 4.31 3.97   HGB 13.6 12.6   HCT 40.2 36.4   MCV 93 92   MCH 31.6 31.7   MCHC 33.8 34.6   RDW 13.1 12.7    320       INR, PTT No lab results found in last 7 days.     Arterial Blood Gas No lab results found in last 7 days.    UA  No lab results found in last 7 days.    Micro   Recent Labs  Lab 01/01/18  1730   SDES Cerebrospinal fluid          Radiological Data  Data   No results found for this or any previous visit (from the past 48 hour(s)).       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     ## GBS: or other GBS-like variant. With progressive sensory and motor changes followed a presumed viral illness. With prominent pain component in legs. Exam shows absent reflexes and severely impaired sensorium in the legs. CSF showed mild lymphocytic pleiocytosis, which can be seen in ~15% of GBS. Will investigate other causes of GBS-like syndrome given some of the atypical findings (mild pleiocytosis, severe pain component, hyponatremia, prior LFT elevations, high CRP).  --IVIG 400mg/kg/day x5 days  --discuss CT CAP in AM  --T Spine MRI with and without contrast  --VZV and EBV Abs  --SSA, SSB  --thiamine, B6 levels  --paraneoplastic neuropathy panel per day team: namely anti-hu, anti-CRMP-5    ## low back and BL leg pain: is a new problem since symptoms onset, likely related to  GBS. previously on tramadol at home. We had a discussion saying that she needs to transition off tramadol to neuropathic pain medication as narcotics are not good long-term pain options.   --pta tramadol tonight, day team to implement pain regimen in the morning.     Diet/IVF: regular diet. NS@75/hr given recent poor PO intake.   Ppx: enoxaparin  Code status: FULL  Dispo: pending w/u    =========================================================    This patient was seen & discussed with my attending, Dr. Ugalde, who agrees with my assessment and plan.     Georges Stubbstiffany  PGY3 Neurology  [phone]      Addendum:  Patient seen with staff this am. Will order NIFs. IVIG to start today.     Sachin Allen MD  Neurology PGY3  P: [phone]      Patient seen and examined by me today January 2, 2018  I agree with details from Dr. Allen's note from 1 jan 2018  Impression: GBS/AIDP  Workup underway as well as management with IVIG  Preet Ugalde MD  January 2, 2018

## 2018-01-02 NOTE — PLAN OF CARE
"Problem: Patient Care Overview  Goal: Plan of Care/Patient Progress Review  Outcome: No Change  Arrived to 6A late evening shift. A&O x4, pleasant and able to make needs known. VSS, with low grade temp of 99.2 at midnight. Neuros unchanged with BLE numbness and \"burning\" from toes to waist. Assist to turn in bed onto bed pan x1. Voided 300cc tenzin urine without difficulty. Pt stated that her baseline is to have BM 4x/day but has not had any in past 4 days. Reports pain in BLE and received Tylenol as a pre-med for IGIV and then Ultram at 0330. Completed first IVIG infusion via PIV well. States she has not been able to walk and had to be carried by family to car to come to hospital. BUE 5/5, BLE 2-3/5. Upon assessment, RN noted that pt has reddened area on R calf. Pt stated it was a burn from bath water at home.  Plan: Continue with POC. Admission to be completed, along with MRI checklist for T spine film today.      "

## 2018-01-02 NOTE — PROGRESS NOTES
Pt requested that gluten be removed from allergy list as it is a preference and she is not actually allergic.

## 2018-01-03 ENCOUNTER — APPOINTMENT (OUTPATIENT)
Dept: MRI IMAGING | Facility: CLINIC | Age: 63
DRG: 095 | End: 2018-01-03
Attending: PSYCHIATRY & NEUROLOGY
Payer: COMMERCIAL

## 2018-01-03 PROBLEM — D18.09 VERTEBRAL BODY HEMANGIOMA: Status: ACTIVE | Noted: 2018-01-03

## 2018-01-03 PROBLEM — K76.9 HEPATIC LESION: Status: ACTIVE | Noted: 2018-01-03

## 2018-01-03 LAB
ALBUMIN UR-MCNC: NEGATIVE MG/DL
APPEARANCE UR: CLEAR
BILIRUB UR QL STRIP: NEGATIVE
COLOR UR AUTO: NORMAL
GLUCOSE UR STRIP-MCNC: NEGATIVE MG/DL
HGB UR QL STRIP: NEGATIVE
KETONES UR STRIP-MCNC: NEGATIVE MG/DL
LACTATE BLD-SCNC: 1.6 MMOL/L (ref 0.7–2)
LACTATE BLD-SCNC: 2.2 MMOL/L (ref 0.7–2)
LEUKOCYTE ESTERASE UR QL STRIP: NEGATIVE
NITRATE UR QL: NEGATIVE
PH UR STRIP: 7 PH (ref 5–7)
SODIUM SERPL-SCNC: 134 MMOL/L (ref 133–144)
SOURCE: NORMAL
SP GR UR STRIP: 1.01 (ref 1–1.03)
UROBILINOGEN UR STRIP-MCNC: NORMAL MG/DL (ref 0–2)
VZV IGM SER IA-ACNC: 0.21 ISR

## 2018-01-03 PROCEDURE — 83605 ASSAY OF LACTIC ACID: CPT | Performed by: PSYCHIATRY & NEUROLOGY

## 2018-01-03 PROCEDURE — 25000128 H RX IP 250 OP 636: Performed by: PSYCHIATRY & NEUROLOGY

## 2018-01-03 PROCEDURE — 72157 MRI CHEST SPINE W/O & W/DYE: CPT

## 2018-01-03 PROCEDURE — 25000132 ZZH RX MED GY IP 250 OP 250 PS 637: Performed by: STUDENT IN AN ORGANIZED HEALTH CARE EDUCATION/TRAINING PROGRAM

## 2018-01-03 PROCEDURE — 25000128 H RX IP 250 OP 636: Performed by: STUDENT IN AN ORGANIZED HEALTH CARE EDUCATION/TRAINING PROGRAM

## 2018-01-03 PROCEDURE — 12000003 ZZH R&B CRITICAL UMMC

## 2018-01-03 PROCEDURE — 36415 COLL VENOUS BLD VENIPUNCTURE: CPT | Performed by: PSYCHIATRY & NEUROLOGY

## 2018-01-03 PROCEDURE — 94150 VITAL CAPACITY TEST: CPT

## 2018-01-03 PROCEDURE — 40000275 ZZH STATISTIC RCP TIME EA 10 MIN

## 2018-01-03 PROCEDURE — 81003 URINALYSIS AUTO W/O SCOPE: CPT | Performed by: PSYCHIATRY & NEUROLOGY

## 2018-01-03 PROCEDURE — 40000556 ZZH STATISTIC PERIPHERAL IV START W US GUIDANCE

## 2018-01-03 PROCEDURE — 84295 ASSAY OF SERUM SODIUM: CPT | Performed by: PSYCHIATRY & NEUROLOGY

## 2018-01-03 PROCEDURE — 25000132 ZZH RX MED GY IP 250 OP 250 PS 637: Performed by: PSYCHIATRY & NEUROLOGY

## 2018-01-03 PROCEDURE — A9585 GADOBUTROL INJECTION: HCPCS | Performed by: PSYCHIATRY & NEUROLOGY

## 2018-01-03 RX ORDER — TRAMADOL HYDROCHLORIDE 50 MG/1
50 TABLET ORAL ONCE
Status: DISCONTINUED | OUTPATIENT
Start: 2018-01-03 | End: 2018-01-03

## 2018-01-03 RX ORDER — HYDROMORPHONE HYDROCHLORIDE 1 MG/ML
0.5 INJECTION, SOLUTION INTRAMUSCULAR; INTRAVENOUS; SUBCUTANEOUS ONCE
Status: COMPLETED | OUTPATIENT
Start: 2018-01-03 | End: 2018-01-03

## 2018-01-03 RX ORDER — HYDROMORPHONE HYDROCHLORIDE 2 MG/1
2 TABLET ORAL EVERY 4 HOURS PRN
Status: DISCONTINUED | OUTPATIENT
Start: 2018-01-03 | End: 2018-01-07 | Stop reason: HOSPADM

## 2018-01-03 RX ORDER — GADOBUTROL 604.72 MG/ML
10 INJECTION INTRAVENOUS ONCE
Status: COMPLETED | OUTPATIENT
Start: 2018-01-03 | End: 2018-01-03

## 2018-01-03 RX ORDER — GABAPENTIN 400 MG/1
400 CAPSULE ORAL 3 TIMES DAILY
Status: DISCONTINUED | OUTPATIENT
Start: 2018-01-03 | End: 2018-01-07 | Stop reason: HOSPADM

## 2018-01-03 RX ADMIN — HUMAN IMMUNOGLOBULIN G 25000 MG: 20 LIQUID INTRAVENOUS at 23:12

## 2018-01-03 RX ADMIN — TRAMADOL HYDROCHLORIDE 50 MG: 50 TABLET, COATED ORAL at 01:45

## 2018-01-03 RX ADMIN — HUMAN IMMUNOGLOBULIN G 25000 MG: 20 LIQUID INTRAVENOUS at 01:38

## 2018-01-03 RX ADMIN — ACETAMINOPHEN 650 MG: 325 TABLET, FILM COATED ORAL at 08:02

## 2018-01-03 RX ADMIN — DIPHENHYDRAMINE HYDROCHLORIDE 50 MG: 25 CAPSULE ORAL at 22:37

## 2018-01-03 RX ADMIN — SENNOSIDES AND DOCUSATE SODIUM 2 TABLET: 8.6; 5 TABLET ORAL at 08:03

## 2018-01-03 RX ADMIN — ACETAMINOPHEN 650 MG: 325 TABLET, FILM COATED ORAL at 22:38

## 2018-01-03 RX ADMIN — Medication 0.5 MG: at 04:41

## 2018-01-03 RX ADMIN — GABAPENTIN 400 MG: 400 CAPSULE ORAL at 08:02

## 2018-01-03 RX ADMIN — GABAPENTIN 400 MG: 400 CAPSULE ORAL at 20:16

## 2018-01-03 RX ADMIN — GABAPENTIN 400 MG: 400 CAPSULE ORAL at 14:59

## 2018-01-03 RX ADMIN — ENOXAPARIN SODIUM 40 MG: 40 INJECTION SUBCUTANEOUS at 08:03

## 2018-01-03 RX ADMIN — GADOBUTROL 8 ML: 604.72 INJECTION INTRAVENOUS at 09:48

## 2018-01-03 RX ADMIN — DIPHENHYDRAMINE HYDROCHLORIDE 50 MG: 25 CAPSULE ORAL at 00:54

## 2018-01-03 RX ADMIN — ACETAMINOPHEN 650 MG: 325 TABLET, FILM COATED ORAL at 00:54

## 2018-01-03 NOTE — PLAN OF CARE
Problem: Pain, Acute (Adult)  Goal: Identify Related Risk Factors and Signs and Symptoms  Related risk factors and signs and symptoms are identified upon initiation of Human Response Clinical Practice Guideline (CPG).   Outcome: No Change  Pt admitted w/ possible GBS. VSS. Pt c/o pain in legs (burning feeling), relieved w/ Tylenol and Gabapentin. Neuros include: numbness of BLE from knees to toes (improving per pt) PIV saline locked per MD. Vegan diet. Voiding via bedpan, Lg BM. Plan for third dose of IVIG at 1100. Up w/ A2, mechanical lift. Thoracic MRI completed today. EMG completed today. Continue to monitor and follow POC.

## 2018-01-03 NOTE — PROGRESS NOTES
"CLINICAL NUTRITION SERVICES - ASSESSMENT NOTE     Nutrition Prescription    RECOMMENDATIONS FOR MDs/PROVIDERS TO ORDER:  Continue bowel meds    Malnutrition Status:    Non-severe malnutrition in the context of acute illness    Recommendations already ordered by Registered Dietitian (RD):  None at this time    Future/Additional Recommendations:  Monitor Na levels and fluid intake  PO adequacy      REASON FOR ASSESSMENT  Kaleigh Ziegler is a/an 62 year old female assessed by the dietitian for Admission Nutrition Risk Screen for reduced oral intake over the last month    NUTRITION HISTORY  Pt presents with symptoms of weakness and inability to walk per chart review  Her progressive weakness and pain contributed to her decreased intake over the last month. She states she started feeling poorly about 3 weeks ago and was only able to eat about 50% of her normal intake  Pt is vegan and gluten free by personal choice. She states she wants to \"keep away from poisons.\"   Her protein sources are pea protein shakes, tofu, beans, and lentils.  Pt states her fluid intake is 2 to 3 20 oz bottles/day with several cups of tea.  Pt reports her baseline is 4 BMs/day and there has not been any documented BMs since admission. The pt states she did have a BM today and continues to request bowel medications as needed. Noted some abdominal discomfort yesterday in flowsheets.    CURRENT NUTRITION ORDERS  Diet: Regular - Vegan  Intake/Tolerance:   Pt is consuming 100% per flowsheets  \"Eating and drinking well\" per MD  Pt states her appetite is back since admission, she feels like eating, and she likes the food available to her.   She notes her mouth has some sores that makes eating acidic foods painful.    LABS  Labs reviewed  Na 128 (L)    MEDICATIONS  Medications reviewed    ANTHROPOMETRICS  Height: 172.7 cm (5' 8\")  Most Recent Weight: 65.9 kg (145 lb 4.5 oz)    IBW: 63.6 kg  BMI: Normal BMI  Weight History: Pt has lost 4 lbs in ~ 2 " weeks, a 3% reduction. Pt states her UBW is 145 lbs but has noticed some weight loss associated with her decreased intake, stating she lost about 10 lbs in the last three weeks.   Wt Readings from Last 10 Encounters:   01/02/18 65.9 kg (145 lb 4.5 oz)   12/23/17 67.8 kg (149 lb 7.6 oz)     Dosing Weight: 66 kg (based on lowest documented wt on 1/2)    ASSESSED NUTRITION NEEDS  Estimated Energy Needs: 1650 - 1980 kcals/day (25 - 30 kcals/kg)  Justification: Maintenance  Estimated Protein Needs: 79 - 99 grams protein/day (1.2 - 1.5 grams of pro/kg)  Justification: Hypercatabolism with acute illness  Estimated Fluid Needs: 1650 - 1980 mL/day (1 mL/kcal)   Justification: Maintenance and Per provider pending fluid status    PHYSICAL FINDINGS  See malnutrition section below.     MALNUTRITION  % Intake: < 75% for > 7 days (non-severe)  % Weight Loss: Up to 5% in 1 month (non-severe)  Subcutaneous Fat Loss: None observed  Muscle Loss: None observed  Fluid Accumulation/Edema: None noted  Malnutrition Diagnosis: Non-severe malnutrition in the context of acute illness    NUTRITION DIAGNOSIS  Predicted inadequate nutrient intake (protein) related to vegan diet/restricted diet, history of poor appetite, weakness as evidenced-based reported decreased PO PTA.     INTERVENTIONS  Implementation  Nutrition Education: Provided education on increased protein needs with illness and fluid intake   Medical food supplement therapy - pt refuses supplements offered at [facility] but agreed to drink pea protein shake BID  Modify composition of meals/snacks - pt to focus on protein intake at meals and snacks and decreased fluid intake to 3 bottles/day    Goals  Patient to consume % of nutritionally adequate meal trays TID, or the equivalent with supplements/snacks.     Monitoring/Evaluation  Progress toward goals will be monitored and evaluated per protocol.    Debbie Mondragon MNT Dietetic Intern    Reviewed and agree with  above  Beena Roth RD, STU, [facility]  Neuro ICU  Pager: [phone]

## 2018-01-03 NOTE — PROVIDER NOTIFICATION
Called Dr. JOSE Hamlin at 1655 to notify of lactic acid result of 2.2 (sepsis protocol had initiated). Dr. Hamlin came to examine patient. No changes.

## 2018-01-03 NOTE — PLAN OF CARE
Problem: Patient Care Overview  Goal: Plan of Care/Patient Progress Review  Outcome: No Change  Pt admitted w/ possible GBS. VSS. Pt c/o pain in legs (burning feeling), relieved w/ Tylenol and Gabapentin, not as well controlled at night. Neuros numbness of BLE from knees to toes, more intense in feet. PIV infusing NS at 75mL/hr. Vegan diet. Voiding via bedpan, no BM. Pt will have IVIG at 0100. Pt up tonight A2, GB & walker. Thoracic MRI ordered, MRI checklist sent. Continue to monitor and follow POC.

## 2018-01-03 NOTE — PROGRESS NOTES
"[facility]  General Neurology Inpatient Progress Note  Patient Name:  Kaleigh Ziegler  :  1955    MRN:  [id number]      Date of Admission: 2018        Today's Date: January 3, 2018                                                                              24 HOUR EVENTS:  - No acute events    SUBJECTIVE:  No acute events overnight. Received 2nd dose of IVIg. Some difficulties with pain overnight. Reports some improved strength. No reported progression of paraesthesias.     OBJECTIVE:   VITAL SIGNS:   /76 (BP Location: Right arm)  Pulse 84  Temp 97.3  F (36.3  C) (Oral)  Resp 16  Ht 1.727 m (5' 8\")  Wt 65.9 kg (145 lb 4.5 oz)  SpO2 97%  BMI 22.09 kg/m2    PHYSICAL EXAMINATION:  General: adult in NAD, cooperative.   HEENT: NC/AT  Neck: supple.  Lungs: no audible wheezing  Heart: S1 S2 present  Abdomen: soft, non-tender.  Extremities: No edema, no cyanosis.  -----------  Neurological:  Mental Status: Awake and alert, cooperative and interacting appropriately. No difficulty following commands. Oriented. Speech fluent, clear and coherent. Naming, registration, recall intact.   Cranial nerves: Visual fields intact. PERRL, EOMI, Facial sensation intact in all distributions. Facial movements symmetric. Uvula midline, palate elevation symmetric. Tongue protrusion midline with full lateral motion.   Motor: Tone normal. Muscle bulk symmetric and appropriate. No tremors or other involuntary movements observed. No pronator Drift. Strength 5/5 and symmetric in upper extremities. 3+/5 hip flexion bilaterally, 4/5 knee flexion/extension and plantar/dorsiflexion bilaterally.   Reflexes: 1+ and symmetric in upper extremities. No reflexes elicited at patellars or ankles. Toes mute.   Sensory:  Normal to light touch throughout upper extremities. No PP in feet. Impaired proprioception bilaterally.    Coordination:  FNF intact.   Station and Gait: Deferred.       PERTINENT " INVESTIGATIONS: All labs and imaging since admission reviewed.      ASSESSMENT/PLAN:  62 year old female, presenting after onset of motor and sensory changes in the lower extremities. 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 Progressive sensory and motor changes following a presumed viral illness. Does have a prominent pain component. Examination mildly improved from motor standpoint; sensory unchanged today. CSF with mild lymphocytic pleiocytosis. 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 MRI T-spine today to help rule out other possible causes such as a transverse myelitis.   - Continue IVIg, day 3 tonight  - T-spine MRI today  - Follow-up Abs, vitamins B6/1 when available  - Follow-up paraneoplastic panel when available, likely not for several more days    # Low back and leg pain:  Persistent issue with her sensory and motor changes as above. Likely part of symptomatolgy with her presumed AIDP. Will require acute pain management and initiation of longer-term approaches.   - Increase gabapentin 400mg TID  - Dilaudid PRN    # Hyponatremia:  Na 128 at admission. Will recheck sodium today. May require IV fluids.     FEN:   -Fluids: Eating and drinking well  -Electrolytes: Replacement protocol   -Nutrition: Regular diet-vegan  Lines:   - IV access: present  - Allen: none  - NG tube: none  - A-Line: none  - Peg tube: none  Consults: PT/OT  Activity: Up with assist   Prophylaxis: SCDs, Lovenox  Code: Full  Dispo: 6A    Patient seen and discussed with neurology attending, Dr. Preet Allen MD  Neurology PGY3  P: [phone]    Patient seen and examined by me today January 3, 2018  I agree with details above from today's note by Dr. Allen from today January 3, 2018  Continue therapy for AIDP/GBS  Preet hathaway MD  January 3, 2018

## 2018-01-03 NOTE — PLAN OF CARE
"Problem: Patient Care Overview  Goal: Plan of Care/Patient Progress Review  Outcome: No Change  A&O x4, pleasant and able to make needs known. VSS, on RA. Neuros unchanged with BLE numbness and \"burning\" and pain from knees to toes. Removed SCD's for a short while, but did not help with pain, so they were replaced. Assist to turn in bed onto bed pan x2. Voiding without difficulty. Pt stated that her baseline is to have BM 4x/day but has not had any in past 4 days. Reports more pain than she has had yet while in the hospital. Found little to no relief with Ultram. MD notified and ordered a 1x dose of Dilaudid 0.5mg, which helped for about 2 hours. BUE 5/5, BLE 2-3/5. Vegan diet. Tolerated second IVIG treatment well.  Plan: Continue with POC. Please check with MD and/or pharmacy to see if next dose of IVIG can be earlier than 0100- pt was not able to sleep much d/t frequent vitals for second night in a row.                                  "

## 2018-01-03 NOTE — PROGRESS NOTES
Care Coordinator Progress Note     Admission Date/Time:  1/1/2018  Attending MD:  Dr Preet Ugalde     Data  Chart reviewed, discussed with interdisciplinary team. Per 6A Discharge Rounds report pt will receive IV IgG dose #3 of 5 today. MRI today. Up with assist of 2. PT & OT consults ordered today.     Patient was admitted for:  Possible Guillain [place].   Pt transferred from [facility] ER for further evaluation & treatment. Pt having lower extremity numbness, pain and weakness.     Concerns with insurance coverage for discharge needs: None. Pt's insurance is [employer].   Current Living Situation: Patient lives with spouse.    Coordination of Care and Referrals    Chart reviewed.      Assessment  Pt with impaired mobility, needing assist of 2; will need PT/OT. Continuing to receiving medical treatment, IgG.      Plan  Anticipated Discharge Date:  Pending stability after IV IgG completed.  Anticipated Discharge Plan:  PT & OT to evaluate and make recommendations.          Rosa Connor RN Care Coordinator  Unit [address], [facility]

## 2018-01-03 NOTE — PROGRESS NOTES
[facility], [place]    Sepsis Evaluation Progress Note    Date of Service: 01/03/2018    I was called to see Kaleigh Ziegler due to elevated lactate. She is not known to have an infection.     Physical Exam    Vital Signs:  Temp: 98.4  F (36.9  C) Temp src: Oral BP: 112/70 Pulse: 96   Resp: 16 SpO2: 97 % O2 Device: None (Room air)      Lab:  Lactic Acid   Date Value Ref Range Status   01/03/2018 2.2 (H) 0.7 - 2.0 mmol/L Final       The patient is at baseline mental status.    The rest of their physical exam is significant for nothing.    Assessment and Plan    The SIRS and exam findings are likely due to EMG elevated her lactate, there is no sign of sepsis at this time.    Disposition: The patient will remain on the current unit. We will continue to monitor this patient closely. Repeat CBC ordered for tomorrow morning. Her vitals are currently normal (HR 80, 's/70). She is comfortable on room air.    Chen Hamlin MD

## 2018-01-04 ENCOUNTER — APPOINTMENT (OUTPATIENT)
Dept: OCCUPATIONAL THERAPY | Facility: CLINIC | Age: 63
DRG: 095 | End: 2018-01-04
Attending: PSYCHIATRY & NEUROLOGY
Payer: COMMERCIAL

## 2018-01-04 ENCOUNTER — APPOINTMENT (OUTPATIENT)
Dept: PHYSICAL THERAPY | Facility: CLINIC | Age: 63
DRG: 095 | End: 2018-01-04
Attending: PSYCHIATRY & NEUROLOGY
Payer: COMMERCIAL

## 2018-01-04 LAB
BASOPHILS # BLD AUTO: 0.1 10E9/L (ref 0–0.2)
BASOPHILS NFR BLD AUTO: 1.8 %
CREAT SERPL-MCNC: 0.49 MG/DL (ref 0.52–1.04)
DIFFERENTIAL METHOD BLD: NORMAL
EOSINOPHIL # BLD AUTO: 0.1 10E9/L (ref 0–0.7)
EOSINOPHIL NFR BLD AUTO: 1.1 %
ERYTHROCYTE [DISTWIDTH] IN BLOOD BY AUTOMATED COUNT: 13.7 % (ref 10–15)
GFR SERPL CREATININE-BSD FRML MDRD: >90 ML/MIN/1.7M2
HCT VFR BLD AUTO: 36.7 % (ref 35–47)
HGB BLD-MCNC: 11.9 G/DL (ref 11.7–15.7)
IMM GRANULOCYTES # BLD: 0 10E9/L (ref 0–0.4)
IMM GRANULOCYTES NFR BLD: 0.4 %
LYMPHOCYTES # BLD AUTO: 1.1 10E9/L (ref 0.8–5.3)
LYMPHOCYTES NFR BLD AUTO: 24 %
MCH RBC QN AUTO: 31.2 PG (ref 26.5–33)
MCHC RBC AUTO-ENTMCNC: 32.4 G/DL (ref 31.5–36.5)
MCV RBC AUTO: 96 FL (ref 78–100)
MONOCYTES # BLD AUTO: 0.7 10E9/L (ref 0–1.3)
MONOCYTES NFR BLD AUTO: 14.9 %
NEUTROPHILS # BLD AUTO: 2.6 10E9/L (ref 1.6–8.3)
NEUTROPHILS NFR BLD AUTO: 57.8 %
NRBC # BLD AUTO: 0 10*3/UL
NRBC BLD AUTO-RTO: 0 /100
PLATELET # BLD AUTO: 311 10E9/L (ref 150–450)
RBC # BLD AUTO: 3.81 10E12/L (ref 3.8–5.2)
VIT B6 SERPL-MCNC: 48.9 NMOL/L (ref 20–125)
WBC # BLD AUTO: 4.5 10E9/L (ref 4–11)

## 2018-01-04 PROCEDURE — 97110 THERAPEUTIC EXERCISES: CPT | Mod: GP

## 2018-01-04 PROCEDURE — 25000132 ZZH RX MED GY IP 250 OP 250 PS 637: Performed by: PSYCHIATRY & NEUROLOGY

## 2018-01-04 PROCEDURE — 12000008 ZZH R&B INTERMEDIATE UMMC

## 2018-01-04 PROCEDURE — 36415 COLL VENOUS BLD VENIPUNCTURE: CPT | Performed by: PSYCHIATRY & NEUROLOGY

## 2018-01-04 PROCEDURE — 97161 PT EVAL LOW COMPLEX 20 MIN: CPT | Mod: GP

## 2018-01-04 PROCEDURE — 97530 THERAPEUTIC ACTIVITIES: CPT | Mod: GP

## 2018-01-04 PROCEDURE — 97530 THERAPEUTIC ACTIVITIES: CPT | Mod: GO

## 2018-01-04 PROCEDURE — 25000128 H RX IP 250 OP 636: Performed by: STUDENT IN AN ORGANIZED HEALTH CARE EDUCATION/TRAINING PROGRAM

## 2018-01-04 PROCEDURE — 40000193 ZZH STATISTIC PT WARD VISIT

## 2018-01-04 PROCEDURE — 82565 ASSAY OF CREATININE: CPT | Performed by: PSYCHIATRY & NEUROLOGY

## 2018-01-04 PROCEDURE — 25000128 H RX IP 250 OP 636: Performed by: PSYCHIATRY & NEUROLOGY

## 2018-01-04 PROCEDURE — 97535 SELF CARE MNGMENT TRAINING: CPT | Mod: GO

## 2018-01-04 PROCEDURE — 97165 OT EVAL LOW COMPLEX 30 MIN: CPT | Mod: GO

## 2018-01-04 PROCEDURE — 25000132 ZZH RX MED GY IP 250 OP 250 PS 637: Performed by: STUDENT IN AN ORGANIZED HEALTH CARE EDUCATION/TRAINING PROGRAM

## 2018-01-04 PROCEDURE — 94150 VITAL CAPACITY TEST: CPT

## 2018-01-04 PROCEDURE — 40000133 ZZH STATISTIC OT WARD VISIT

## 2018-01-04 PROCEDURE — 85025 COMPLETE CBC W/AUTO DIFF WBC: CPT | Performed by: PSYCHIATRY & NEUROLOGY

## 2018-01-04 RX ADMIN — GABAPENTIN 400 MG: 400 CAPSULE ORAL at 13:16

## 2018-01-04 RX ADMIN — ACETAMINOPHEN 650 MG: 325 TABLET, FILM COATED ORAL at 20:31

## 2018-01-04 RX ADMIN — ACETAMINOPHEN 650 MG: 325 TABLET, FILM COATED ORAL at 13:16

## 2018-01-04 RX ADMIN — GABAPENTIN 400 MG: 400 CAPSULE ORAL at 20:32

## 2018-01-04 RX ADMIN — GABAPENTIN 400 MG: 400 CAPSULE ORAL at 08:25

## 2018-01-04 RX ADMIN — HUMAN IMMUNOGLOBULIN G 25000 MG: 20 LIQUID INTRAVENOUS at 21:07

## 2018-01-04 RX ADMIN — ENOXAPARIN SODIUM 40 MG: 40 INJECTION SUBCUTANEOUS at 08:25

## 2018-01-04 RX ADMIN — DIPHENHYDRAMINE HYDROCHLORIDE 50 MG: 25 CAPSULE ORAL at 20:31

## 2018-01-04 NOTE — PLAN OF CARE
Problem: Patient Care Overview  Goal: Plan of Care/Patient Progress Review  Outcome: No Change  Cared for from [id number]: AVSS, triggered sepsis, lactic acid level 2.2, please see previous note. A&Ox4. Neuros unchanged with BLE weakness and N/T from knees to toes. Stated pain is improving. Taking tylenol and gabapentin. 3/5 IVIG to be started tonight at 2300. Up with A2/lift. Able to move self independently in bed. On a regular diet with good PO intake. Voiding spon via bedpan. x1 BM today. MRI and EMG completed earlier today. Had visitors this evening. PCD's on. Continue with POC.

## 2018-01-04 NOTE — PROGRESS NOTES
"[facility]  General Neurology Inpatient Progress Note  Patient Name:  Kaleigh Ziegler  :  1955    MRN:  [id number]      Date of Admission: 2018        Today's Date: 2018                                                                              24 HOUR EVENTS:  - No acute events    SUBJECTIVE:  No acute events overnight. Received 3rd dose of IVIg. Improved pain control overnight. Improved sleep overnight. No reported progression of paraesthesias.     OBJECTIVE:   VITAL SIGNS:   /76 (BP Location: Right arm)  Pulse 78  Temp 96.7  F (35.9  C) (Oral)  Resp 16  Ht 1.727 m (5' 8\")  Wt 65.9 kg (145 lb 4.5 oz)  SpO2 97%  BMI 22.09 kg/m2    PHYSICAL EXAMINATION:  General: adult in NAD, cooperative.   HEENT: NC/AT  Neck: supple.  Lungs: no audible wheezing  Heart: S1 S2 present  Abdomen: soft, non-tender.  Extremities: No edema, no cyanosis.  -----------  Neurological:  Mental Status: Awake and alert, cooperative and interacting appropriately. No difficulty following commands. Oriented. Speech fluent, clear and coherent. Naming, registration, recall intact.   Cranial nerves: Visual fields intact. PERRL, EOMI, Facial sensation intact in all distributions. Facial movements symmetric. Uvula midline, palate elevation symmetric. Tongue protrusion midline with full lateral motion.   Motor: Tone normal. Muscle bulk symmetric and appropriate. No tremors or other involuntary movements observed. No pronator Drift. Strength 5/5 and symmetric in upper extremities. 3+/5 hip flexion bilaterally, 4/5 knee flexion/extension and plantar/dorsiflexion bilaterally.   Reflexes: 1+ and symmetric in upper extremities. No reflexes elicited at patellars or ankles. Toes mute.   Sensory:  Normal to light touch throughout upper extremities. No PP in feet. Impaired proprioception bilaterally.    Coordination:  FNF intact.   Station and Gait: Deferred.       PERTINENT INVESTIGATIONS: All " labs and imaging since admission reviewed.      ASSESSMENT/PLAN:  62 year old female, presenting after onset of motor and sensory changes in the lower extremities. 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 Progressive sensory and motor changes following a presumed viral illness. Does have a prominent pain component. Examination mildly improved from motor standpoint; sensory unchanged today. CSF with mild lymphocytic pleiocytosis. 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 MRI T-spine performed did not show any evidence of transverse myelitis or other structural spinal cord lesion that could explain her symptoms.  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  - Continue IVIg, day 4 tonight  - Follow-up Abs, vitamins B6/1 when available  - Follow-up paraneoplastic panel when available, likely not for several more days    # Low back and leg pain:  Persistent issue with her sensory and motor changes as above. Likely part of symptomatolgy with her presumed AIDP. Will require acute pain management and initiation of longer-term approaches.   - Increase gabapentin 400mg TID  - Dilaudid PRN    # Hyponatremia:  Na 128 at admission.  Recheck yesterday at 134.  May discontinue IV fluids.    FEN:   -Fluids: Eating and drinking well  -Electrolytes: Replacement protocol   -Nutrition: Regular diet-vegan  Lines:   - IV access: present  - Allen: none  - NG tube: none  - A-Line: none  - Peg tube: none  Consults: PT/OT  Activity: Up with assist   Prophylaxis: SCDs, Lovenox  Code: Full  Dispo: 6A    Patient seen and discussed with neurology attending, Dr. Preet Allen MD  Neurology PGY3  P: [id number]      Patient seen and examined by me today January 4, 2018  I agree with Dr. Allen's note from today January 4, 2018  AIDP/GBS  Will continue with IVIG  Agree with other details above.   Preet Ugalde MD  January 4, 2018

## 2018-01-04 NOTE — PLAN OF CARE
Problem: Patient Care Overview  Goal: Plan of Care/Patient Progress Review  Discharge Planner PT   Patient plan for discharge: rehab  Current status: Pt eval completed; tx indicated. Pt is 3+/5 R; 4-/5 L hip flexor, 4/5 knee extensor bilaterally, 3/5 R; 3+/5 L knee flexors, 3-/5 L;3/5 R dorsiflexors, 3/5 plantarflexors bilaterally. Francesco UE strength 5/5. Pt has impaired sensation below knee bilaterally.  Pt performed mod independent due to heavy UE use with bed mobility and supine <> sit with HOB elevated 30 degrees. Pt performed sit <> stand x 2 with min A x 2 and CGA when standing ~3 minutes. Pt was able to perform weightshifts and mini marches in standing with min A x 2. Pt performed small steps forward in order to complete standing pivot transfer with FWW and min A x 2. Pt performed seated LE exercises to improve muscle activation and strength.     Pt is at an elevated fall risk due to weakness and sensation impairments. At this time, Pt needs A x 1 - 2 with FWW for standing pivot transfers.     Barriers to return to prior living situation: medical needs, level of A, stair negotiation, impaired functional mobility, impaired ADLs/ IADLs  Recommendations for discharge: ARU  Rationale for recommendations: Pt would benefit from intensive multidisciplanary therapy services to improve LE strength, endurance, and coordination, safety with least restrictive device with ambulation, functional strength/safety with transfers, stair negotiation and improve tolerance for daily tasks. Pt is significantly below  previous level of function. It is anticipated that patient would be able to tolerate 3 hours of therapy a day. Pt is motivated and has good support system in place.       Entered by: Geeta Mir 01/04/2018 10:11 AM

## 2018-01-04 NOTE — PROGRESS NOTES
01/04/18 1300   Quick Adds   Type of Visit Initial Occupational Therapy Evaluation   Living Environment   Lives With spouse   Living Arrangements house   Home Accessibility bed and bath on same level;stairs to enter home;stairs within home;tub/shower is not walk in   Number of Stairs to Enter Home 2   Number of Stairs Within Home 12   Stair Railings at Home inside, present on right side   Living Environment Comment Pt lives in two story home with    Self-Care   Dominant Hand right   Usual Activity Tolerance excellent   Regular Exercise yes   Activity/Exercise Type other (see comments)  (Yoga)   Exercise Amount/Frequency daily   Equipment Currently Used at Home walker, rolling   Functional Level Prior   Ambulation 0-->independent   Transferring 0-->independent   Toileting 0-->independent   Bathing 0-->independent   Dressing 0-->independent   Eating 0-->independent   Communication 0-->understands/communicates without difficulty   Swallowing 0-->swallows foods/liquids without difficulty   Cognition 0 - no cognition issues reported   Fall history within last six months yes   Number of times patient has fallen within last six months 1   Which of the above functional risks had a recent onset or change? ambulation;transferring;toileting;bathing;dressing   Prior Functional Level Comment Pt IND in all ADLs and IADLs prior to symptom onset in Dec 2017, since then has been using 4WW and assist from  with some ADLs (dressing, bathing)   General Information   Onset of Illness/Injury or Date of Surgery - Date 01/01/18   Referring Physician Sachin Allen MD   Patient/Family Goals Statement Return to IND in ADLs   Additional Occupational Profile Info/Pertinent History of Current Problem 62 year old female, presenting after onset of motor and sensory changes in the lower extremities. 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   Precautions/Limitations fall precautions   Cognitive Status Examination   Orientation orientation  to person, place and time   Cognitive Comment Cog intact   Sensory Examination   Sensory Comments P repots numbness knee and below in BLEs   Range of Motion (ROM)   ROM Comment WFL in BUE   Strength   Strength Comments WFL in BUE   Instrumental Activities of Daily Living (IADL)   Previous Responsibilities (Pt was IND)   Activities of Daily Living Analysis   Impairments Contributing to Impaired Activities of Daily Living strength decreased;sensation decreased;pain;balance impaired;coordination impaired   General Therapy Interventions   Planned Therapy Interventions ADL retraining;transfer training   Clinical Impression   Criteria for Skilled Therapeutic Interventions Met yes, treatment indicated   OT Diagnosis Decreased IND in ADLs   Influenced by the following impairments BLE weakness, numbness   Assessment of Occupational Performance 3-5 Performance Deficits   Identified Performance Deficits Dressing, bathing, grooming, transfers, toileting   Clinical Decision Making (Complexity) Low complexity   Therapy Frequency daily   Predicted Duration of Therapy Intervention (days/wks) 2 weeks   Anticipated Discharge Disposition Acute Rehabilitation Facility   Risks and Benefits of Treatment have been explained. Yes   Patient, Family & other staff in agreement with plan of care Yes   Total Evaluation Time   Total Evaluation Time (Minutes) 9

## 2018-01-04 NOTE — PLAN OF CARE
Problem: Patient Care Overview  Goal: Plan of Care/Patient Progress Review  Outcome: No Change  VSS. BLE numbness from knees to toes; moderate dorsi/plantars bilat; BLE 4/5. Denies pain this shift.  3rd dose IVIG started at 2300 last ana; please try to give at 2100 today. Voiding spont via bedpan; 2 large BMs yesterday. PIV SL'd. Up with lift; moves in bed independently. Reg diet. Continue with POC.

## 2018-01-04 NOTE — PROGRESS NOTES
01/04/18 0800   Quick Adds   Quick Adds Student Supervision-Eval Only   Type of Visit Initial PT Evaluation   Student Supervision-Eval Only    Student Supervision On-site supervision provided;Direct supervision provided       Present no   Language English   Living Environment   Lives With spouse   Living Arrangements house   Home Accessibility bed and bath on same level;stairs to enter home;stairs within home;tub/shower is not walk in   Number of Stairs to Enter Home 2   Number of Stairs Within Home 12   Stair Railings at Home inside, present on right side   Transportation Available family or friend will provide   Living Environment Comment Pt lives in a two level home with main level and flight of stairs into basement (12 stairs) with railing on the right. Pt has 2 steps to enter home at multiple entrances to home with standard steps without railings present. Pt has tub shower on main level with bedroom on same level.  Pt has walkin shower in basement. Pt utilized a friends 4WW for ambulation with progression of symptoms in December of 2017. Pt has no assistive equipment in bathroom. Pt's spouse has helped Pt with dressing, toileting, and transfers and most recently with ambulation due to profound LE weakness.    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 Pt does travel quite frequently for her jobs. Pt enjoys performing yoga multiple times a week.   Functional Level Prior   Ambulation 0-->independent   Transferring 0-->independent   Toileting 0-->independent   Bathing 0-->independent   Dressing 0-->independent   Eating 0-->independent   Communication  0-->understands/communicates without difficulty   Swallowing 0-->swallows foods/liquids without difficulty   Cognition 0 - no cognition issues reported   Fall history within last six months yes   Number of times patient has fallen within last six months 1   Which of the above functional risks had a recent onset or change? ambulation;transferring;toileting;bathing;dressing   Prior Functional Level Comment Pt independent with all ADLS/IADLS prior to current course of symptoms in December of 2017. Pt had experienced multiple minor falls in home on carpet with use of 4WW due to weakness.    General Information   Onset of Illness/Injury or Date of Surgery - Date 01/01/18   Referring Physician Preet Ugalde MD   Patient/Family Goals Statement Pt is motivated to return to previous level of function.   Pertinent History of Current Problem (include personal factors and/or comorbidities that impact the POC) 62F w/o much past medical history here with possible GBS. Patient says her symptoms began in mid-December and began with BL posterior severe calf pain that later involved her posterior thigh and low back. A day or two later she noted some numbness of the feet that progressed over a few days up to the ankles and then stopped progressing proximally. The pain became worse over time and eventually limited her ambulation. She says her legs became weak after the numbness set in but she is unsure of exactly when the weakness came on d/t her being limited in her ambulation as it made her pain worse. She says the weakness and pain are about equally responsible for her difficulty ambulating. She visited the ED a couple of times and was sent home without answer. She began using a walker until she presented again to the ED today, after which she was transferred here for further evaluation. She thinks she has not yet hit a plateau in regards to her symptoms, and they continue to progress. She thinks her symptoms were preceded by  two weeks by a viral illness that gave her chills. Denies recent diarrheal illness. Never had these symptoms before. Pt endorses weight loss beginning after symptom onset. Never smoked. Was previously ambulating normally at baseline prior to onset of symptoms. Is a doctor of education.    Precautions/Limitations fall precautions   Weight-Bearing Status - LUE full weight-bearing   Weight-Bearing Status - RUE full weight-bearing   Weight-Bearing Status - LLE full weight-bearing   Weight-Bearing Status - RLE full weight-bearing   Heart Disease Risk Factors Medical history   General Observations Up with assist   Cognitive Status Examination   Orientation orientation to person, place and time   Level of Consciousness alert   Follows Commands and Answers Questions 100% of the time   Personal Safety and Judgment intact   Memory intact   Pain Assessment   Patient Currently in Pain Yes, see Vital Sign flowsheet   Integumentary/Edema   Integumentary/Edema no deficits were identifed   Posture    Posture Forward head position;Protracted shoulders   Range of Motion (ROM)   ROM Comment WFL in UE; Decreased in arom in BLE limited due to decreased strength   Strength   Strength Comments Pt is 3+/5 R; 4-/5 L hip flexor, 4/5 knee extensor bilaterally, 3/5 R; 3+/5 L knee flexors, 3-/5 L;3/5 R dorsiflexors, 3/5 plantarflexors bilaterally. Francesco UE strength WFL.   Bed Mobility   Bed Mobility Comments Modified independent scooting; supine <> sit   Transfer Skills   Transfer Comments Sit <> stand: min A x 2   Gait   Gait Comments Not formally assessed. Pt able to take short steps with heavy UE use on FWW with min A x 2 for standing pivot turn.   Balance   Balance other (describe)   Balance Comments Standing balance impaired.   Sensory Examination   Sensory Perception Comments Impaired sensation to crude touch. Pt has general kinesthesia awareness at francesco big toes.    Coordination   Coordination no deficits were identified   Coordination  "Comments Unable to accurately assess in LE due to profound weakness.   General Therapy Interventions   Planned Therapy Interventions balance training;bed mobility training;gait training;neuromuscular re-education;strengthening;transfer training;progressive activity/exercise   Clinical Impression   Criteria for Skilled Therapeutic Intervention yes, treatment indicated   PT Diagnosis Impaired functional mobility    Influenced by the following impairments Decreased LE strength, endurance, coordination, impaired sensation   Functional limitations due to impairments Decreased ability to perform transfers, ADLS, IADLS without heavy assist.   Clinical Presentation Stable/Uncomplicated   Clinical Presentation Rationale Clinicial Judgement, [facility]   Clinical Decision Making (Complexity) Low complexity   Therapy Frequency` daily   Predicted Duration of Therapy Intervention (days/wks) 1 week before d/c to intensive rehab   Anticipated Discharge Disposition Acute Rehabilitation Facility   Risk & Benefits of therapy have been explained Yes   Patient, Family & other staff in agreement with plan of care Yes   Clinical Impression Comments Pt eval completed; tx indicated.   [facility] [employer] TM \"6 Clicks\"   2016, Trustees of [facility], under license to [employer].  All rights reserved.   6 Clicks Short Forms Basic Mobility Inpatient Short Form   [facility]  \"6 Clicks\" V.2 Basic Mobility Inpatient Short Form   1. Turning from your back to your side while in a flat bed without using bedrails? 4 - None   2. Moving from lying on your back to sitting on the side of a flat bed without using bedrails? 4 - None   3. Moving to and from a bed to a chair (including a wheelchair)? 2 - A Lot   4. Standing up from a chair using your arms (e.g., wheelchair, or bedside chair)? 3 - A Little   5. To walk in hospital room? 2 - A Lot   6. Climbing 3-5 steps with a railing? 1 - Total   Basic Mobility Raw Score (Score out of " 24.Lower scores equate to lower levels of function) 16   Total Evaluation Time   Total Evaluation Time (Minutes) 10

## 2018-01-04 NOTE — PLAN OF CARE
Problem: Patient Care Overview  Goal: Plan of Care/Patient Progress Review  Discharge Planner OT   Patient plan for discharge: ARU  Current status: Patient seen for OT evaluation and treatment. Patient limited by BLE weakness/numbness. Patient performs bed mob SBA with min cues for technique. Patient performs stand pivot transfer x3 reps to chair and commode with min Ax1 with increased use of BUEs on FWW to compensate for BLE weakness. Patient educated on modified dressing techniques, c/o nerve pain in b/l calves. Patient performs pericare without assist in sitting, would requires increased assist in standing as patient only able to tolerate standing without BUE support ~20 seconds with min A from OT.  Barriers to return to prior living situation: BLE weakness, sensory changes in BLEs, increased need for assist  Recommendations for discharge: ARU  Rationale for recommendations: Patient very independent in all ADLs and IADLs prior to admit, demos neurologic deficits impacting current performance.       Entered by: Evelin Pradhan 01/04/2018 3:56 PM

## 2018-01-05 ENCOUNTER — APPOINTMENT (OUTPATIENT)
Dept: PHYSICAL THERAPY | Facility: CLINIC | Age: 63
DRG: 095 | End: 2018-01-05
Attending: PSYCHIATRY & NEUROLOGY
Payer: COMMERCIAL

## 2018-01-05 ENCOUNTER — APPOINTMENT (OUTPATIENT)
Dept: OCCUPATIONAL THERAPY | Facility: CLINIC | Age: 63
DRG: 095 | End: 2018-01-05
Attending: PSYCHIATRY & NEUROLOGY
Payer: COMMERCIAL

## 2018-01-05 LAB — VIT B1 SERPL-MCNC: 9 NMOL/L (ref 4–15)

## 2018-01-05 PROCEDURE — 97535 SELF CARE MNGMENT TRAINING: CPT | Mod: GO

## 2018-01-05 PROCEDURE — 25000128 H RX IP 250 OP 636: Performed by: PSYCHIATRY & NEUROLOGY

## 2018-01-05 PROCEDURE — 97530 THERAPEUTIC ACTIVITIES: CPT | Mod: GP

## 2018-01-05 PROCEDURE — 12000008 ZZH R&B INTERMEDIATE UMMC

## 2018-01-05 PROCEDURE — 40000133 ZZH STATISTIC OT WARD VISIT

## 2018-01-05 PROCEDURE — 40000193 ZZH STATISTIC PT WARD VISIT

## 2018-01-05 PROCEDURE — 97110 THERAPEUTIC EXERCISES: CPT | Mod: GP

## 2018-01-05 PROCEDURE — 25000132 ZZH RX MED GY IP 250 OP 250 PS 637: Performed by: PSYCHIATRY & NEUROLOGY

## 2018-01-05 PROCEDURE — 94150 VITAL CAPACITY TEST: CPT

## 2018-01-05 PROCEDURE — 25000132 ZZH RX MED GY IP 250 OP 250 PS 637: Performed by: STUDENT IN AN ORGANIZED HEALTH CARE EDUCATION/TRAINING PROGRAM

## 2018-01-05 PROCEDURE — 25000128 H RX IP 250 OP 636: Performed by: STUDENT IN AN ORGANIZED HEALTH CARE EDUCATION/TRAINING PROGRAM

## 2018-01-05 PROCEDURE — 97112 NEUROMUSCULAR REEDUCATION: CPT | Mod: GP

## 2018-01-05 PROCEDURE — 40000275 ZZH STATISTIC RCP TIME EA 10 MIN

## 2018-01-05 RX ORDER — GABAPENTIN 300 MG/1
300 CAPSULE ORAL DAILY PRN
Status: DISCONTINUED | OUTPATIENT
Start: 2018-01-05 | End: 2018-01-07 | Stop reason: HOSPADM

## 2018-01-05 RX ADMIN — GABAPENTIN 400 MG: 400 CAPSULE ORAL at 08:21

## 2018-01-05 RX ADMIN — ACETAMINOPHEN 650 MG: 325 TABLET, FILM COATED ORAL at 18:30

## 2018-01-05 RX ADMIN — ACETAMINOPHEN 650 MG: 325 TABLET, FILM COATED ORAL at 02:33

## 2018-01-05 RX ADMIN — TRAMADOL HYDROCHLORIDE 50 MG: 50 TABLET, COATED ORAL at 00:19

## 2018-01-05 RX ADMIN — ENOXAPARIN SODIUM 40 MG: 40 INJECTION SUBCUTANEOUS at 08:21

## 2018-01-05 RX ADMIN — HUMAN IMMUNOGLOBULIN G 25000 MG: 20 LIQUID INTRAVENOUS at 18:59

## 2018-01-05 RX ADMIN — ACETAMINOPHEN 650 MG: 325 TABLET, FILM COATED ORAL at 08:21

## 2018-01-05 RX ADMIN — GABAPENTIN 400 MG: 400 CAPSULE ORAL at 13:41

## 2018-01-05 RX ADMIN — GABAPENTIN 400 MG: 400 CAPSULE ORAL at 20:04

## 2018-01-05 RX ADMIN — DIPHENHYDRAMINE HYDROCHLORIDE 50 MG: 25 CAPSULE ORAL at 18:29

## 2018-01-05 RX ADMIN — HYDROMORPHONE HYDROCHLORIDE 2 MG: 2 TABLET ORAL at 04:21

## 2018-01-05 NOTE — PROGRESS NOTES
"[facility]  General Neurology Inpatient Progress Note  Patient Name:  Kaleigh Ziegler  :  1955    MRN:  [id number]      Date of Admission: 2018        Today's Date: 2018                                                                              24 HOUR EVENTS:  - No acute events    SUBJECTIVE:  No acute events overnight. Received 4th dose of IVIg.  Some pain overnight but improved by the a.m.  No other endorsed issues at this time.    OBJECTIVE:   VITAL SIGNS:   /75 (BP Location: Right arm)  Pulse 87  Temp 97.2  F (36.2  C) (Oral)  Resp 16  Ht 1.727 m (5' 8\")  Wt 65.9 kg (145 lb 4.5 oz)  SpO2 94%  BMI 22.09 kg/m2    PHYSICAL EXAMINATION:  General: adult in NAD, cooperative.   HEENT: NC/AT  Neck: supple.  Lungs: no audible wheezing  Heart: S1 S2 present  Abdomen: soft, non-tender.  Extremities: No edema, no cyanosis.  -----------  Neurological:  Mental Status: Awake and alert, cooperative and interacting appropriately. No difficulty following commands. Oriented. Speech fluent, clear and coherent. Naming, registration, recall intact.   Cranial nerves: Visual fields intact. PERRL, EOMI, Facial sensation intact in all distributions. Facial movements symmetric. Uvula midline, palate elevation symmetric. Tongue protrusion midline with full lateral motion.   Motor: Tone normal. Muscle bulk symmetric and appropriate. No tremors or other involuntary movements observed. No pronator Drift. Strength 5/5 and symmetric in upper extremities. 4/5 hip flexion bilaterally, 4/5 knee flexion/extension.  4/5 with left foot dorsi flexion and plantar flexion, 3+/5 with right foot dorsiflexion/plantar flexion.  Reflexes: 2+ and symmetric in upper extremities. No reflexes elicited at patellars or ankles. Toes mute.   Sensory:  Normal to light touch throughout upper extremities. No PP in feet. Impaired proprioception bilaterally.    Coordination:  FNF intact.   Station and " Gait: Deferred.       PERTINENT INVESTIGATIONS: All labs and imaging since admission reviewed.      ASSESSMENT/PLAN:  62 year old female, presenting after onset of motor and sensory changes in the lower extremities. 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    # AIDP:  RFA. Progressive sensory and motor changes following a presumed viral illness. Does have a prominent pain component. Examination mildly improved from motor standpoint; sensory unchanged today. CSF with mild lymphocytic pleiocytosis. 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 MRI T-spine performed did not show any evidence of transverse myelitis or other structural spinal cord lesion that could explain her symptoms.  Recent EMG did correlate that this is AIDP.  - Continue IVIg, day 5 tonight  - Follow-up Abs, vitamins B6/1 when available  - Follow-up paraneoplastic panel when available, likely not for several more days  -Patient will have follow-up EMG in roughly 2 weeks time following discharge.  -PMR consult to evaluate suitability for ARU at discharge    # Low back and leg pain:  Persistent issue with her sensory and motor changes as above. Likely part of symptomatolgy with her presumed AIDP. Will require acute pain management and initiation of longer-term approaches.   - Increase gabapentin 400mg TID, will consider increased nighttime dose prior to initiation of IVIG  - Dilaudid PRN    # Hyponatremia:  Previous issue.  Recheck was at 134.  Monitoring.    FEN:   -Fluids: Eating and drinking well  -Electrolytes: Replacement protocol   -Nutrition: Regular diet-vegan  Lines:   - IV access: present  - Allen: none  - NG tube: none  - A-Line: none  - Peg tube: none  Consults: PT/OT/PMR  Activity: Up with assist   Prophylaxis: SCDs, Lovenox  Code: Full  Dispo: 6A    Patient seen and discussed with neurology attending, Dr. Preet Allen MD  Neurology PGY3  P: [id number]    Patient seen and examined by me today January 5, 2018  I agree with DR. Allen's note from today  January 5, 2018 (see above) and agree with treatment plan  Preet Ugalde MD  January 5, 2018

## 2018-01-05 NOTE — CONSULTS
[facility]   PM&R CONSULT    Consulting Provider: Alejandro  Reason for Consult: Assessment of rehabilitation   Location of Patient: 6A  Date of Encounter: 1/5/2018   Date of Admission: 1/1/2018    ASSESSMENT/PLAN:    Ms. Kaleigh Ziegler is a 62 year old female admitted for GBS s/p 4 rounds of IVIG who presents with bilateral lower extremity weakness and sensory changes leading to functional impairments in mobility and ADLs.  She is an appropriate candidate for ARU once medically stable, as she was previously independent, is able to tolerate 3 hours of therapy per day, is highly motivated to participate in therapies, and has an excellent support system.    Would recommend consideration of PRAFOs to be worn overnight in the meantime in order to prevent contractures given her degree of weakness with ankle dorsiflexion.    HPI:    61 yo female, previously healthy female who was admitted for progressive sensory and motor changes in lower extremities following a viral illness on 1/1.  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  She received her 4th dose of IVIG today.  She has had significant neuropathic pain and primary team is titrating her gabapentin.    She reports that weakness has improved somewhat with IVIG.  She remains weak in distal>proximal BLE and feels they are about equal in severity.  Reports sensory changes extending up to knees bilaterally.  She has never had any weakness or sensory changes in upper limbs, bulbar symptoms, or respiratory problems.        PREVIOUS LEVEL OF FUNCTION   Independent in mobility, ADLs, and IADLs.  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  Exercising frequently as a hobby.      CURRENT FUNCTION   OT: Patient seen for showering task, ambulates with close CGA-min A with FWW and cues for safety. Patient performs shower transfer mod A into flat entry shower onto chair.  Patient performs bathing with min A, increased time and no standing due to BLE numbness and weakness. Patient tolerates well, OT providing overview of energy conservation during session.  Recommending ARU.  PT: Pt eval completed; tx indicated. Pt is 3+/5 R; 4-/5 L hip flexor, 4/5 knee extensor bilaterally, 3/5 R; 3+/5 L knee flexors, 3-/5 L;3/5 R dorsiflexors, 3/5 plantarflexors bilaterally. Francesco UE strength 5/5. Pt has impaired sensation below knee bilaterally.  Pt performed mod independent due to heavy UE use with bed mobility and supine <> sit with HOB elevated 30 degrees. Pt performed sit <> stand x 2 with min A x 2 and CGA when standing ~3 minutes. Pt was able to perform weightshifts and mini marches in standing with min A x 2. Pt performed small steps forward in order to complete standing pivot transfer with FWW and min A x 2. Pt performed seated LE exercises to improve muscle activation and strength. Recommending ARU.     LIVING SITUATION/SUPPORT  Lives with  in ranch style home with 2 SAURAV and 4-5 stairs up to bedroom, full flight down to basement with shower.  Has tub on second floor.   works full time.  Has extensive support network of friends that would be able to provide additional assistance after discharge if needed.    SOCIAL HISTORY  Social History     Social History     Marital status:      Spouse name: N/A     Number of children: N/A     Years of education: N/A     Social History Main Topics     Smoking status: Never Smoker     Smokeless tobacco: Never Used     Alcohol use No     Drug use: No     Sexual activity: Not Asked     Other Topics Concern     None     Social History Narrative    Past Medical History:      History of mitral valve prolapse         Past Surgical History:      GYN surgery    Tubal ligation         Family History:      History reviewed. No pertinent family history.           Social History:    Smoking status: Never Smoker    Alcohol use: No     Marital  Status:       Presents with  at bedside.         Past Medical History:  Past Medical History:   Diagnosis Date     Back pain 1/1/2018     H/O magnetic resonance imaging of lumbar spine 1/1/2018    MRI LUMBAR SPINE WITHOUT AND WITH CONTRAST 12/24/2017 12:02 AM    HISTORY: Bilateral leg pain and back pain. Fever. Rule out spine infection. Pain started months ago, but is worse today.   TECHNIQUE: Multiplanar multisequence MRI of the lumbar spine without and with 7 mL Gadavist.   COMPARISON: None.   FINDINGS: The report is dictated assuming five lumbar-type vertebral bodies, and radiographic co     H/O mitral valve prolapse      Hepatic lesion 1/3/2018    Impression:   1. No definite abnormality of the thoracic spinal cord or abnormal enhancement.  2. No significant spinal canal or neural foraminal stenosis at any level, with minimal multilevel thoracic degenerative disease. 3. Multilevel atypical-appearing benign vertebral hemangiomas, most prominent being involving most of the anterior segment of the T6 vertebral body. No follow-up required of      Vertebral body hemangioma T6 1/3/2018    Impression:   1. No definite abnormality of the thoracic spinal cord or abnormal enhancement.  2. No significant spinal canal or neural foraminal stenosis at any level, with minimal multilevel thoracic degenerative disease. 3. Multilevel atypical-appearing benign vertebral hemangiomas, most prominent being involving most of the anterior segment of the T6 vertebral body. No follow-up required of th           Current Medications:  Current Facility-Administered Medications   Medication     gabapentin (NEURONTIN) capsule 300 mg     gabapentin (NEURONTIN) capsule 400 mg     HYDROmorphone (DILAUDID) tablet 2 mg     acetaminophen (TYLENOL) tablet 650 mg     diphenhydrAMINE (BENADRYL) capsule 50 mg    Or     diphenhydrAMINE (BENADRYL) injection 50 mg     traMADol (ULTRAM) tablet 50 mg     diphenhydrAMINE (BENADRYL) capsule 50  mg    Or     diphenhydrAMINE (BENADRYL) injection 50 mg     acetaminophen (TYLENOL) tablet 650 mg     EPINEPHrine (ADRENALIN) kit 0.3 mg     diphenhydrAMINE (BENADRYL) injection 50 mg     methylPREDNISolone sodium succinate (solu-MEDROL) injection 125 mg     ranitidine (ZANTAC) injection 50 mg     naloxone (NARCAN) injection 0.1-0.4 mg     enoxaparin (LOVENOX) injection 40 mg     potassium chloride SA (K-DUR/KLOR-CON M) CR tablet 20-40 mEq     potassium chloride (KLOR-CON) Packet 20-40 mEq     potassium chloride 10 mEq in 100 mL sterile water intermittent infusion (premix)     potassium chloride 10 mEq in 100 mL intermittent infusion with 10 mg lidocaine     potassium chloride 20 mEq in 50 mL intermittent infusion     magnesium sulfate 4 g in 100 mL sterile water (premade)     acetaminophen (TYLENOL) tablet 650 mg     senna-docusate (SENOKOT-S;PERICOLACE) 8.6-50 MG per tablet 1 tablet    Or     senna-docusate (SENOKOT-S;PERICOLACE) 8.6-50 MG per tablet 2 tablet     ondansetron (ZOFRAN-ODT) ODT tab 4 mg    Or     ondansetron (ZOFRAN) injection 4 mg     immune globulin - sucrose free 10 % injection 25,000 mg         Review of Systems:  Total of ten systems reviewed, pertinent positives and negatives as follows  Instability with standing and walking.    Sleep well.  Reports weakness in bilateral LE  Numbness in bilateral LE.  No problems with bladder.   No problems with bowel.  No chest pain. No cough or SOB.  No visual changes.   No headache or photophobia.   No nausea, or abdominal pain.  No joint pain, muscle pain or swelling.  Does have neuropathic pain.  Mood is good, denies any symptoms of depression.   Remainder of the review of the systems was negative.          Labs   Lab Results   Component Value Date    WBC 4.5 01/04/2018    HGB 11.9 01/04/2018    HCT 36.7 01/04/2018    MCV 96 01/04/2018     01/04/2018     Lab Results   Component Value Date     01/03/2018    POTASSIUM 3.8 01/01/2018    CHLORIDE  "94 01/01/2018    CO2 32 01/01/2018     (H) 01/01/2018     Lab Results   Component Value Date    GFRESTIMATED >90 01/04/2018    GFRESTBLACK >90 01/04/2018     Lab Results   Component Value Date    AST 45 01/01/2018    ALT 89 (H) 01/01/2018    ALKPHOS 58 01/01/2018    BILITOTAL 0.8 01/01/2018     No results found for: INR  Lab Results   Component Value Date    BUN 19 01/01/2018    CR 0.49 (L) 01/04/2018         ON EXAMINATION:  Vitals:    01/04/18 2350 01/05/18 0426 01/05/18 0814 01/05/18 0826   BP: 123/79 128/75  130/65   BP Location: Right arm Right arm  Left arm   Pulse: 84 87 95 101   Resp:  16 16 18   Temp: 98.2  F (36.8  C) 97.2  F (36.2  C)  98.8  F (37.1  C)   TempSrc:  Oral Oral Oral   SpO2: 94%  96% 97%   Weight:       Height:           Physical Exam:  Blood pressure 130/65, pulse 101, temperature 98.8  F (37.1  C), temperature source Oral, resp. rate 18, height 1.727 m (5' 8\"), weight 65.9 kg (145 lb 4.5 oz), SpO2 97 %.    GEN: NAD, lying comfortably in bed  She is alert, appropriate, cooperative  Speech is intact  Comprehension is intact  HEENT: NCAT  RESPIRATORY: Unlabored on room air  MSK: full active and passive ROM at all major joints of the bilaterally upper and lower extremities.  Can passively stretch ankles to 10 past neutral bilaterally.  No muscle atrophy noted  ABD: soft, non tende  NEURO:   CRANIAL NERVES: Pupils equal, round and reactive to light. Extraocular movements full. Visual fields full. Face moves symmetrically.  Tongue midline. Hearing intact to finger rubbing.    Sensation: sensation to light touch intact throughout, but has subjective \"odd feelings\" from patella distally bilaterally.   Strength: UE 5/5 bilaterally.  LE 4/5 hip flexors, quads, 2/5 ankle dorsiflexors on right and 3/5 on left, 4/5 plantarflexors.   Cognition: fund of knowledge and train of thought appropriate  SKIN: several bruises on anterior shins  EXT: no edema  PSYCH: normal affect.  Mood is baseline with no " signs of depression  Mey Kilgore  Patient seen and discussed with Dr. Orozco.    --------------------------------------    I, Zayda Orozco, saw this patient with my resident Dr. Kilgore and agree with her findings and plan of care as documented in her note.     I personally reviewed the chart (vitals signs, medications, labs and imaging).     My key findings and key management decisions made by me: Ms. Ziegler is a previously healthy, previously independent woman who was admitted with progressive lower extremity weakness secondary to AIDP, resulting in decreased independence with mobility and ADLs. These functional impairments can be impacted by intensive rehabilitation, and therefore our recommendation would be for her to participate in acute inpatient rehabilitation when medically stable, prior to discharge home. Frequency of the prescribed therapy would be 3 hours of PT/OT daily (90 min daily in each discipline) during the rehab stay. Discussed this option with the patient today and she is very much in favor of transitioning to acute inpatient rehabilitation when medically stable. Anticipate that she will progress to be modified independent.     Thank you for this consultation. Please do not hesitate to contact me with further questions.   Zayda Orozco MD  Physical Medicine and Rehabilitation  [phone]      I spent a total of 60 minutes face-to-face and managing the care of Kaleigh Ziegler. Over 50% of my time on the unit was spent counseling the patient and coordinating care. See note for details.

## 2018-01-05 NOTE — PLAN OF CARE
Problem: Patient Care Overview  Goal: Plan of Care/Patient Progress Review  Outcome: No Change  VSS. Alert & Ox4. Neuros: BLE numbness from knees to toes. Managed pain with scheduled Tramadol, Tylenol, & Dilauded. Voiding adequately. OOB w/ Ax1, GB, & walker. Regular (Vegan) diet. Blanchable redness to coccyx, pt applies barrier cream. Voiding adequately. No BM during shift. PIV SL'd. Continue with POC.

## 2018-01-05 NOTE — PLAN OF CARE
Problem: Patient Care Overview  Goal: Plan of Care/Patient Progress Review  Discharge Planner PT   Patient plan for discharge: ARU  Current status: Pt performed UE exercises to improve functional strength for transfers and ambulation with FWW. Pt performed supine<>sit , bed scooting with modified independence and min A x 1 for positioning of carlene LE for successful positioning. Pt performed seated LE exercises without upper body support for sitting balance to improve LE strength and muscle activation. Pt performed sit <> stand with min A/ CGA x 1 and ambulated to bathroom with CGA x 1 and Vc for walking mechanics. Pt needed min A x 1 for standing balance with UE support. Pt tolerated session well with improvements in LE muscle function noted with less therapist assist. At this time, Pt is appropriate to ambulate to bathroom with FWW and A x 1-2.   Barriers to return to prior living situation: medical needs, level of A, impaired functional mobility, stair negotiation, significant carlene LE weaknesses  Recommendations for discharge: ARU  Rationale for recommendations: Pt would benefit from intensive PT/OT services to improve LE strength, coordination, and endurance, safety with transfers, ambulation with least restrictive device, tolerance for daily activities/work, and to improve balance. Pt is highly motivated with great support system. Pt is significantly below baseline level of function and anticipate would tolerate 3 hours of PT/OT per day.       Entered by: Geeta Mir 01/05/2018 4:46 PM

## 2018-01-05 NOTE — PLAN OF CARE
"Problem: Patient Care Overview  Goal: Plan of Care/Patient Progress Review  Pt here w/possible GBS, vss, neuros include: a/o x4, BUE 5/5, BLE 4/5, w/some numbness, moderate D/P, and weak flexion. PIV SL, regular/vegan diet, up AO1 w/GB and walker, scheduled pain meds (Gabapentin) provided w/good relief and pt declining need for PRN valium. Pt voiding spont, passing gas, no BM, worked w/therapies, was able to shower w/OT, pt has \"patient care order\" for her protein which @ bedside. Last dose of IVIG rescheduled by pharmacy for 1900, continue to monitor per MD orders.       "

## 2018-01-05 NOTE — PLAN OF CARE
Problem: Patient Care Overview  Goal: Plan of Care/Patient Progress Review  A/O x4.VSS.Numbness of BLE from knees to toes. BUE 5/5; BLE 4/5. No c/o pain or discomfort this evening.Tolerated 4th dose of IVIG well.Goodpo intake.PIV SL.Up with A1/GB/walker to the bedside commode. Blanchable redness to coccyx, barrier cream applied with each void and BM. Turn/reposition Q 2hrs.Appropriate with call lights. Pneumo boots on.Continue to monitor and follow POC.

## 2018-01-05 NOTE — PLAN OF CARE
Problem: Patient Care Overview  Goal: Plan of Care/Patient Progress Review  Discharge Planner OT   Patient plan for discharge: ARU  Current status: Patient seen for showering task, ambulates with close CGA-min A with FWW and cues for safety. Patient performs shower transfer mod A into flat entry shower onto chair. Patient performs bathing with min A, increased time and no standing due to BLE numbness and weakness. Patient tolerates well, OT providing overview of energy conservation during session.  Barriers to return to prior living situation: BLE numbness and weakness, increased need for assist with all ADLs and mobility  Recommendations for discharge: ARU  Rationale for recommendations: Intensive rehab to address neurologic symptoms.       Entered by: Evelin Pradhan 01/05/2018 11:16 AM

## 2018-01-06 ENCOUNTER — APPOINTMENT (OUTPATIENT)
Dept: PHYSICAL THERAPY | Facility: CLINIC | Age: 63
DRG: 095 | End: 2018-01-06
Attending: PSYCHIATRY & NEUROLOGY
Payer: COMMERCIAL

## 2018-01-06 LAB
BACTERIA SPEC CULT: NO GROWTH
SPECIMEN SOURCE: NORMAL

## 2018-01-06 PROCEDURE — 25000128 H RX IP 250 OP 636: Performed by: STUDENT IN AN ORGANIZED HEALTH CARE EDUCATION/TRAINING PROGRAM

## 2018-01-06 PROCEDURE — 97112 NEUROMUSCULAR REEDUCATION: CPT | Mod: GP | Performed by: PHYSICAL THERAPIST

## 2018-01-06 PROCEDURE — 12000008 ZZH R&B INTERMEDIATE UMMC

## 2018-01-06 PROCEDURE — 97530 THERAPEUTIC ACTIVITIES: CPT | Mod: GP | Performed by: PHYSICAL THERAPIST

## 2018-01-06 PROCEDURE — 25000132 ZZH RX MED GY IP 250 OP 250 PS 637: Performed by: PSYCHIATRY & NEUROLOGY

## 2018-01-06 PROCEDURE — 94150 VITAL CAPACITY TEST: CPT

## 2018-01-06 PROCEDURE — 25000132 ZZH RX MED GY IP 250 OP 250 PS 637: Performed by: STUDENT IN AN ORGANIZED HEALTH CARE EDUCATION/TRAINING PROGRAM

## 2018-01-06 PROCEDURE — 40000275 ZZH STATISTIC RCP TIME EA 10 MIN

## 2018-01-06 PROCEDURE — 40000193 ZZH STATISTIC PT WARD VISIT: Performed by: PHYSICAL THERAPIST

## 2018-01-06 RX ADMIN — ACETAMINOPHEN 650 MG: 325 TABLET, FILM COATED ORAL at 05:53

## 2018-01-06 RX ADMIN — GABAPENTIN 400 MG: 400 CAPSULE ORAL at 07:39

## 2018-01-06 RX ADMIN — ACETAMINOPHEN 650 MG: 325 TABLET, FILM COATED ORAL at 23:32

## 2018-01-06 RX ADMIN — TRAMADOL HYDROCHLORIDE 50 MG: 50 TABLET, COATED ORAL at 00:10

## 2018-01-06 RX ADMIN — HYDROMORPHONE HYDROCHLORIDE 2 MG: 2 TABLET ORAL at 23:36

## 2018-01-06 RX ADMIN — GABAPENTIN 400 MG: 400 CAPSULE ORAL at 20:06

## 2018-01-06 RX ADMIN — TRAMADOL HYDROCHLORIDE 50 MG: 50 TABLET, COATED ORAL at 05:53

## 2018-01-06 RX ADMIN — GABAPENTIN 400 MG: 400 CAPSULE ORAL at 13:30

## 2018-01-06 RX ADMIN — ACETAMINOPHEN 650 MG: 325 TABLET, FILM COATED ORAL at 00:10

## 2018-01-06 RX ADMIN — ENOXAPARIN SODIUM 40 MG: 40 INJECTION SUBCUTANEOUS at 08:45

## 2018-01-06 NOTE — PROGRESS NOTES
"[facility]  General Neurology Inpatient Progress Note  Patient Name:  Kaleigh Ziegler  :  1955    MRN:  [id number]      Date of Admission: 2018        Today's Date: 2018                                                                              24 HOUR EVENTS:  - No acute events  - Completed round 5 IVIG last night    SUBJECTIVE:  No acute events overnight. Received 4th dose of IVIg.  Some pain overnight but improved by the a.m.  No other endorsed issues at this time.    OBJECTIVE:   VITAL SIGNS:   /75 (BP Location: Right arm)  Pulse 81  Temp 98.2  F (36.8  C) (Oral)  Resp 16  Ht 1.727 m (5' 8\")  Wt 65.9 kg (145 lb 4.5 oz)  SpO2 99%  BMI 22.09 kg/m2    PHYSICAL EXAMINATION:  General: adult in NAD, cooperative.   HEENT: NC/AT  Neck: supple.  Lungs: no audible wheezing  Heart: S1 S2 present  Abdomen: soft, non-tender.  Extremities: No edema, no cyanosis.  -----------  Neurological:  Mental Status: Awake and alert, cooperative and interacting appropriately. No difficulty following commands. Oriented. Speech fluent, clear and coherent. Naming, registration, recall intact.   Cranial nerves: Visual fields intact. PERRL, EOMI, Facial sensation intact in all distributions. Facial movements symmetric. Uvula midline, palate elevation symmetric. Tongue protrusion midline with full lateral motion.   Motor: Tone normal. Muscle bulk symmetric and appropriate. No tremors or other involuntary movements observed. No pronator Drift. Strength 5/5 and symmetric in upper extremities. 4/5 hip flexion bilaterally, 5/5 knee extension bilaterally, 4/5 knee flexion bilaterally.  4/5 with left foot dorsi flexion and plantar flexion, 4-/5 with right foot dorsiflexion/plantar flexion.  Reflexes: 2+ and symmetric in upper extremities. No reflexes elicited at patellars or ankles.   Sensory:  Normal to light touch throughout upper extremities. No PP in feet. Impaired " proprioception bilaterally.    Coordination:  FNF intact.   Station and Gait: Deferred.       PERTINENT INVESTIGATIONS: All labs and imaging since admission reviewed.      ASSESSMENT/PLAN:  62 year old female, presenting after onset of motor and sensory changes in the lower extremities. 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    # AIDP:  RFA. Progressive sensory and motor changes following a presumed viral illness.   Examination mildly improved from motor standpoint; sensory unchanged today. CSF with mild lymphocytic pleiocytosis. 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 No spine lesion on MRI.  Recent EMG did correlate that this is AIDP. B1 and B6 normal range  - Completed IVIG  - Follow-up paraneoplastic panel when available, likely not for several more days  -Patient will have follow-up EMG in roughly 2 weeks time following discharge.    # Low back and leg pain:  Persistent issue with her sensory and motor changes as above. Likely part of symptomatolgy with her presumed AIDP. Will require acute pain management and initiation of longer-term approaches.   - Increase gabapentin 400mg TID, will consider increased nighttime dose prior to initiation of IVIG  - Dilaudid PRN    # Hyponatremia:  Previous issue.  Recheck was at 134.  Monitoring.    FEN:   -Fluids: Eating and drinking well  -Electrolytes: Replacement protocol   -Nutrition: Regular diet-vegan  Lines:   - IV access: present  - Allen: none  - NG tube: none  - A-Line: none  - Peg tube: none  Consults: PT/OT/PMR all recommend ARU  Activity: Up with assist   Prophylaxis: SCDs, Lovenox  Code: Full  Dispo: ARU 1-2 days    Patient seen and discussed with neurology attending, Dr. Preet Cooper MD  Neurology PGY2    Patient seen and examined by me today January 6, 2018  I agree with the note by Dr. Cooper above from today January 6, 2018  Preet hathaway md  January 6, 2018

## 2018-01-06 NOTE — PLAN OF CARE
Problem: Patient Care Overview  Goal: Plan of Care/Patient Progress Review  A/O x4.VSS.Numbness of BLE from knees to toes. BUE 5/5; BLE 4/5. No c/o pain or discomfort this evening.Tolerated the last dose of IVIG well.Goodpo intake.PIV SL.Up with A1/GB/walker to the bathroom. Blanchable redness to coccyx, barrier cream applied with each void and BM. Turn/reposition Q 2hrs.Appropriate with call lights. Pneumo boots on.Continue to monitor and follow POC.

## 2018-01-06 NOTE — PLAN OF CARE
Problem: Patient Care Overview  Goal: Plan of Care/Patient Progress Review  Pt here for suspected GBS, vss, neuros include: a/o x4, BUE 5/5, BLE 4/5 w/moderate D/P flexion, pt c/o of bilateral tingling and minimal numbness in pinky fingers, numbness in BLE. PIV SL, regular/vegan diet, voiding spont, no BM this shift but passing gas and active bowel sounds, up SBA w/GB and walker. Pain in BLE relieved w/showering and scheduled gabapentin, pt up in chair, eating well. Per SW note pt will be leaving tomorrow afternoon via [facility] transport w/wheelchair @ 1100 to [facility]. Continue to monitor per MD orders.

## 2018-01-06 NOTE — PLAN OF CARE
Problem: Patient Care Overview  Goal: Plan of Care/Patient Progress Review  PT/6A:   Discharge Planner PT   Patient plan for discharge: ARU   Current status: Patient ambulated x200 feet with FWW and Horacio with steppage patterning and occasional knee buckling noted. Cues provided for less weight through UE's and forward gaze. Improvements noted in gait pattern and distance today, with patient demonstrating good insight into her abilities and responding well to cues from therapist.   Barriers to return to prior living situation: deconditioning, level of A required for mobility and ADL's, impaired gait, decreased coordination, impaired balance and sensation   Recommendations for discharge: ARU   Rationale for recommendations: Patient is highly motivated to participate in therapies, has been making improvements in her gait tolerance, and was previously IND at baseline.        Entered by: Angela Ro 01/06/2018 4:37 PM

## 2018-01-06 NOTE — PROGRESS NOTES
Social Work Services Progress Note    Hospital Day: 6  Date of Initial Social Work Evaluation:  N/a  Consulted with:  MD and  ARU    Data:  Patient with PM&R recommendation for ARU    Intervention:  Received call from  ARU admissions asking how many doses of IVig patient will receive - they cannot come to ARU until these are finished. Spoke with MD and patient received their last dose today - patient is medically ready for discharge. Spoke with  ARU and they reported that they have no beds at present and they need to check their voicemail to see if [facility] called back with an auth on Friday 1.5.18 before end of day.    Assessment:  Patient with recomendation for ARU from PM&R    Plan:    Anticipated Disposition:  Facility:  [facility]    Barriers to d/c plan:  Auth and bed availability    Follow Up:  Will await return call from  ARU re: auth. Will have SWer on Sunday 1.7.18 call about available beds.    Cintia JUÁREZ LICSW  1/6/2018    ON CALL PAGER   0800  1600   [phone]    ON CALL COVERAGE AFTER 1600  [phone]

## 2018-01-06 NOTE — PLAN OF CARE
Problem: Patient Care Overview  Goal: Plan of Care/Patient Progress Review  Outcome: Improving  Here with presumed GBS. AVSS. A&Ox4. Neuros: BLE weakness (4/5) and numb, weak dorsi flexion, and bilateral pinkies tingling . Pain well controlled with prn Ultram and Tylenol. Voiding without difficulty in bathroom. Pt has red, kyle able area on coccyx barrier cream, aquaphor applied and weight shifted off of bottom, and pt frequently repositions self. PIV SL. Up SBA. Regular diet. Plan for discharge to ARU when medically stable. Noted in PMR note that PRAFO boot is recommended at night; please talk to therapies when they are here to order boot, RN is unable. Cont to monitor and with POC.

## 2018-01-06 NOTE — PROGRESS NOTES
Social Work Services Discharge Note      Patient Name:  Kaleigh Ziegler     Anticipated Discharge Date:  1.7.18    Discharge Disposition:   ARU:  [facility]    Following MD:  Attending MD on ARU service will follow     Pre-Admission Screening (PAS) online form has been completed.  The Level of Care (LOC) is:  Determined  Confirmation Code is:  N/a for ARU  Patient/caregiver informed of referral to [facility] for Pre-Admission Screening for skilled nursing facility (SNF) placement and to expect a phone call post discharge from SNF.     Additional Services/Equipment Arranged:  [facility] will take care of having orthotist visit to measure for the PM&R recommended items.      Patient / Family response to discharge plan:  Patient is very pleased to hear about the insurance auth and available bed. She is ready to go tomorrow. She will let her  know.     Persons notified of above discharge plan:  MD, RN, patient and [facility] ARU    Staff Discharge Instructions:    Please call RN to RN report to [phone].    CTS Handoff completed:  No    Patient will leave Sunday 1.7.18 at 1100 via [employer] ([phone]) STANTON JUÁREZ LICSW  1/6/2018    ON CALL PAGER   0800 - 1600   [phone]    ON CALL COVERAGE AFTER 1600  [phone]

## 2018-01-07 ENCOUNTER — HOSPITAL ENCOUNTER (INPATIENT)
Facility: CLINIC | Age: 63
LOS: 9 days | Discharge: HOME HEALTH | DRG: 096 | End: 2018-01-16
Attending: PHYSICAL MEDICINE & REHABILITATION | Admitting: PHYSICAL MEDICINE & REHABILITATION
Payer: COMMERCIAL

## 2018-01-07 VITALS
HEART RATE: 91 BPM | TEMPERATURE: 95.9 F | BODY MASS INDEX: 22.02 KG/M2 | RESPIRATION RATE: 16 BRPM | WEIGHT: 145.28 LBS | HEIGHT: 68 IN | SYSTOLIC BLOOD PRESSURE: 116 MMHG | OXYGEN SATURATION: 98 % | DIASTOLIC BLOOD PRESSURE: 74 MMHG

## 2018-01-07 DIAGNOSIS — K59.01 SLOW TRANSIT CONSTIPATION: ICD-10-CM

## 2018-01-07 DIAGNOSIS — G61.0 AIDP (ACUTE INFLAMMATORY DEMYELINATING POLYNEUROPATHY) (H): Primary | ICD-10-CM

## 2018-01-07 DIAGNOSIS — F51.01 PRIMARY INSOMNIA: ICD-10-CM

## 2018-01-07 DIAGNOSIS — M79.2 NEUROPATHIC PAIN: ICD-10-CM

## 2018-01-07 DIAGNOSIS — G61.0: ICD-10-CM

## 2018-01-07 DIAGNOSIS — M79.10 MUSCLE PAIN: ICD-10-CM

## 2018-01-07 LAB
CREAT SERPL-MCNC: 0.47 MG/DL (ref 0.52–1.04)
GFR SERPL CREATININE-BSD FRML MDRD: >90 ML/MIN/1.7M2
PLATELET # BLD AUTO: 231 10E9/L (ref 150–450)
SODIUM SERPL-SCNC: 134 MMOL/L (ref 133–144)

## 2018-01-07 PROCEDURE — 82565 ASSAY OF CREATININE: CPT | Performed by: PSYCHIATRY & NEUROLOGY

## 2018-01-07 PROCEDURE — 85049 AUTOMATED PLATELET COUNT: CPT | Performed by: PSYCHIATRY & NEUROLOGY

## 2018-01-07 PROCEDURE — 25000132 ZZH RX MED GY IP 250 OP 250 PS 637: Performed by: PHYSICAL MEDICINE & REHABILITATION

## 2018-01-07 PROCEDURE — 25000132 ZZH RX MED GY IP 250 OP 250 PS 637: Performed by: STUDENT IN AN ORGANIZED HEALTH CARE EDUCATION/TRAINING PROGRAM

## 2018-01-07 PROCEDURE — 25000132 ZZH RX MED GY IP 250 OP 250 PS 637: Performed by: PSYCHIATRY & NEUROLOGY

## 2018-01-07 PROCEDURE — 40000275 ZZH STATISTIC RCP TIME EA 10 MIN

## 2018-01-07 PROCEDURE — 12800006 ZZH R&B REHAB

## 2018-01-07 PROCEDURE — 25000128 H RX IP 250 OP 636: Performed by: STUDENT IN AN ORGANIZED HEALTH CARE EDUCATION/TRAINING PROGRAM

## 2018-01-07 PROCEDURE — 84295 ASSAY OF SERUM SODIUM: CPT | Performed by: PSYCHIATRY & NEUROLOGY

## 2018-01-07 PROCEDURE — 94150 VITAL CAPACITY TEST: CPT

## 2018-01-07 PROCEDURE — 36415 COLL VENOUS BLD VENIPUNCTURE: CPT | Performed by: PSYCHIATRY & NEUROLOGY

## 2018-01-07 RX ORDER — AMOXICILLIN 250 MG
1-2 CAPSULE ORAL 2 TIMES DAILY PRN
Status: DISCONTINUED | OUTPATIENT
Start: 2018-01-07 | End: 2018-01-16 | Stop reason: HOSPADM

## 2018-01-07 RX ORDER — TRAMADOL HYDROCHLORIDE 50 MG/1
50 TABLET ORAL EVERY 6 HOURS PRN
Status: DISCONTINUED | OUTPATIENT
Start: 2018-01-07 | End: 2018-01-16 | Stop reason: HOSPADM

## 2018-01-07 RX ORDER — GABAPENTIN 400 MG/1
400 CAPSULE ORAL 3 TIMES DAILY
Qty: 90 CAPSULE | Refills: 3 | Status: ON HOLD | DISCHARGE
Start: 2018-01-07 | End: 2018-01-16

## 2018-01-07 RX ORDER — POLYETHYLENE GLYCOL 3350 17 G/17G
17 POWDER, FOR SOLUTION ORAL DAILY PRN
Status: DISCONTINUED | OUTPATIENT
Start: 2018-01-07 | End: 2018-01-16 | Stop reason: HOSPADM

## 2018-01-07 RX ORDER — HYDROMORPHONE HYDROCHLORIDE 2 MG/1
2 TABLET ORAL EVERY 6 HOURS PRN
Qty: 12 TABLET | Refills: 0 | Status: ON HOLD | DISCHARGE
Start: 2018-01-07 | End: 2018-01-16

## 2018-01-07 RX ORDER — NALOXONE HYDROCHLORIDE 0.4 MG/ML
.1-.4 INJECTION, SOLUTION INTRAMUSCULAR; INTRAVENOUS; SUBCUTANEOUS
Status: DISCONTINUED | OUTPATIENT
Start: 2018-01-07 | End: 2018-01-16 | Stop reason: HOSPADM

## 2018-01-07 RX ORDER — HYDROMORPHONE HYDROCHLORIDE 2 MG/1
2 TABLET ORAL EVERY 6 HOURS PRN
Status: DISCONTINUED | OUTPATIENT
Start: 2018-01-07 | End: 2018-01-07

## 2018-01-07 RX ORDER — HYDROMORPHONE HYDROCHLORIDE 2 MG/1
2 TABLET ORAL EVERY 6 HOURS PRN
Status: DISCONTINUED | OUTPATIENT
Start: 2018-01-07 | End: 2018-01-09

## 2018-01-07 RX ORDER — GABAPENTIN 300 MG/1
300 CAPSULE ORAL
Status: DISCONTINUED | OUTPATIENT
Start: 2018-01-07 | End: 2018-01-09

## 2018-01-07 RX ORDER — GABAPENTIN 100 MG/1
300 CAPSULE ORAL
Qty: 90 CAPSULE | Refills: 1 | Status: ON HOLD | DISCHARGE
Start: 2018-01-07 | End: 2018-01-16

## 2018-01-07 RX ADMIN — GABAPENTIN 400 MG: 400 CAPSULE ORAL at 06:53

## 2018-01-07 RX ADMIN — ENOXAPARIN SODIUM 40 MG: 40 INJECTION SUBCUTANEOUS at 08:31

## 2018-01-07 RX ADMIN — HYDROMORPHONE HYDROCHLORIDE 2 MG: 2 TABLET ORAL at 22:25

## 2018-01-07 RX ADMIN — GABAPENTIN 400 MG: 300 CAPSULE ORAL at 19:52

## 2018-01-07 RX ADMIN — TRAMADOL HYDROCHLORIDE 50 MG: 50 TABLET, COATED ORAL at 03:16

## 2018-01-07 RX ADMIN — GABAPENTIN 400 MG: 300 CAPSULE ORAL at 13:31

## 2018-01-07 RX ADMIN — ACETAMINOPHEN 650 MG: 325 TABLET, FILM COATED ORAL at 03:16

## 2018-01-07 NOTE — PLAN OF CARE
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 Afebrile. VSS on RA. BLE burning pain (scheduled [place] given) and lower back discomfort (repositioned). A&Ox4. Neuros include: BUE 5/5, BLE 4/5 w/moderate D/P flexion, pt c/o of bilateral tingling and minimal numbness in pinky fingers, numbness in BLE. PIV SL'd. Regular/vegan diet; had stir-trevino for dinner but stated it did not sit well with her. Declined needing any intervention for nausea. Voiding spontaneously. BM x2. Up SBA w/GB and walker; ambulated with PT this afternoon. Plan per SW note for to transfer tomorrow afternoon via HE transport w/wheelchair @ 1100 to -ARU. Continue to monitor and follow POC.      "

## 2018-01-07 NOTE — DISCHARGE SUMMARY
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 Patient says her symptoms began in mid-December and began with BL posterior severe calf pain that later involved her posterior thigh and low back. A day or two later she noted some numbness of the feet that progressed over a few days up to the ankles and then stopped progressing proximally. The pain became worse over time and eventually limited her ambulation. She says her legs became weak after the numbness set in but she is unsure of exactly when the weakness came on d/t her being limited in her ambulation as it made her pain worse. She says the weakness and pain are about equally responsible for her difficulty ambulating. She visited the ED a couple of times and was sent home without answer. She began using a walker until she presented again to the ED today, after which she was transferred here for further evaluation. She thinks she has not yet hit a plateau in regards to her symptoms, and they continue to progress. She thinks her symptoms were preceded by two weeks by a viral illness that gave her chills. Denies recent diarrheal illness. Never had these symptoms before. Pt endorses weight loss beginning after symptom onset. Never smoked. Was previously ambulating normally at baseline prior to onset of symptoms. Is a  doctor of education.      In the ED, she was found to have BL LE weakness as well as dropped BL leg reflexes. A lumbar MRI with contrast showed no enhancement or explanation. She had an LP showing c14 (lympho predominant), p140, g63. She also had a mildly high CRP at 13.2. AST/ALT were both elevated to the 100s on the 27th that has been resolving. HIV, Lyme, CK negative. Sodium 128.     Hospital Course:  1. AIDP:  Reason for admission. Presentation with progressive LE weakness as above. Admitted and received 5 day course of IVIg. Patient responded well to treatments; no noted side effects or other adverse reactions by the patient. Never had any respiratory distress. No endorsed UE symptoms. MRI of t-spine did not reveal any myositis or related potential cause of similar symptoms. CSF with mild lymphocytic pleocytosis. 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 She is to have repeat EMG ~2 weeks following discharge. Overall, showed some good improvement in her motor examination in the lower extremities. Some issues with pain managed with gabapentin and dilaudid prn as below.     2. Low back/leg pain:  Persistent issue present with her sensory and motor changes as above with AIDP. Likely part of AIDP symptomatolgy. Maintained on gabapentin and PRN dilaudid during hospitalization. Will discharge on gabapentin 400mg TID with additional 300mg PRN HS as well as short course of PRN dilaudid. Ultimately, gabapentin may be increased to 600mg TID and above as required over the next 2-3 days.     3. Hyponatremia:   Sodium 128 at admission. Initially received IV fluids. Corrected. No further intervention required.     Pertinent Investigations:  1. MRI T-spine:  Impression:    1. No definite abnormality of the thoracic spinal cord or abnormal  enhancement.   2. No significant spinal canal or neural foraminal stenosis at any  level, with minimal multilevel thoracic degenerative disease.  3. Multilevel atypical-appearing benign  "vertebral hemangiomas, most  prominent being involving most of the anterior segment of the T6  vertebral body. No follow-up required of this benign-appearing lesion.  4. 5.4 cm plus well-circumscribed grossly benign-appearing hepatic  cyst versus less likely hepatic hemangioma suspected; as this is not  entirely covered on these images, consider a dedicated hepatic  ultrasound or CT, which could be helpful for further evaluation.     ERICK MORALES MD    Consultations:    PT/OT    Discharge physical examination:   /74 (BP Location: Right arm)  Pulse 91  Temp 95.9  F (35.5  C) (Axillary)  Resp 16  Ht 1.727 m (5' 8\")  Wt 65.9 kg (145 lb 4.5 oz)  SpO2 98%  BMI 22.09 kg/m2  General: adult in NAD, cooperative.   HEENT: NC/AT  Neck: supple.  Lungs: no audible wheezing  Heart: S1 S2 present  Abdomen: soft, non-tender.  Extremities: No edema, no cyanosis.  -----------  Neurological:  Mental Status: Awake and alert, cooperative and interacting appropriately. No difficulty following commands. Oriented. Speech fluent, clear and coherent. Naming, registration, recall intact.   Cranial nerves: Visual fields intact. PERRL, EOMI, Facial sensation intact in all distributions. Facial movements symmetric. Uvula midline, palate elevation symmetric. Tongue protrusion midline with full lateral motion.   Motor: Tone normal. Muscle bulk symmetric and appropriate. No tremors or other involuntary movements observed. No pronator Drift. Strength 5/5 and symmetric in upper extremities. 4/5 hip flexion bilaterally, 5/5 knee extension bilaterally, 4/5 knee flexion bilaterally.  4/5 with left foot dorsiflexion and plantar flexion, 4-/5 with right foot dorsiflexion/plantar flexion.  Reflexes: 2+ and symmetric in upper extremities. No reflexes elicited at patellars or ankles.   Sensory:  Normal to light touch throughout upper extremities. No PP in feet. Impaired proprioception bilaterally.    Coordination:  FNF intact.   Station " and Gait: Able to walk with walker       Discharge Medications:  Current Discharge Medication List      START taking these medications    Details   HYDROmorphone (DILAUDID) 2 MG tablet Take 1 tablet (2 mg) by mouth every 6 hours as needed for moderate to severe pain  Qty: 12 tablet, Refills: 0    Associated Diagnoses: Acute inflammatory demyelinating polyneuropathy (H)         CONTINUE these medications which have CHANGED    Details   !! gabapentin (NEURONTIN) 400 MG capsule Take 1 capsule (400 mg) by mouth 3 times daily  Qty: 90 capsule, Refills: 3    Associated Diagnoses: Acute inflammatory demyelinating polyneuropathy (H)      !! gabapentin (NEURONTIN) 100 MG capsule Take 3 capsules (300 mg) by mouth nightly as needed  Qty: 90 capsule, Refills: 1    Comments: May be given as an extra dose alongside her scheduled PM dose PRN for pain.  Associated Diagnoses: Acute inflammatory demyelinating polyneuropathy (H)       !! - Potential duplicate medications found. Please discuss with provider.      CONTINUE these medications which have NOT CHANGED    Details   traMADol (ULTRAM) 50 MG tablet Take 1 tablet (50 mg) by mouth every 6 hours as needed for pain  Qty: 10 tablet, Refills: 0             Discharge follow up and instructions:    General info for SNF   Length of Stay Estimate: Short Term Care: Estimated # of Days <30  Condition at Discharge: Improving  Level of care:board and care  Rehabilitation Potential: Excellent  Admission H&P remains valid and up-to-date: Yes  Recent Chemotherapy: N/A  Use Nursing Home Standing Orders: N/A     Mantoux instructions   Give two-step Mantoux (PPD) Per Facility Policy Yes     Reason for your hospital stay   You were in the hospital for further evaluation and treatment of Guillian-[place] Syndrome     Follow Up and recommended labs and tests   Follow up with primary care provider in 1-2 weeks.  No follow up labs or test are needed.  Follow up with me in [facility]  clinic in 1-2 months.  No follow up labs or test are needed.  Further ongoing evaluation and care per your physicians in ARU.     Activity - Up with assistive device     Full Code     Physical Therapy Adult Consult   Evaluate and treat as clinically indicated.    Reason:  AIDP     Occupational Therapy Adult Consult   Evaluate and treat as clinically indicated.    Reason:  AIDP     EMG     Fall precautions     Advance Diet as Tolerated   Follow this diet upon discharge: Orders Placed This Encounter     Combination Diet Regular Diet Adult, Vegan Diet         Patient seen and discussed with Dr. Preet Allen MD  Neurology PGY3  Pgr: [phone]    Patient seen and examined by me today January 7, 2018  I agree with Dr. Mattson's note from today January 7, 2018  <30 minutes spent in discharge planning  Preet hathaway md  January 7, 2018

## 2018-01-07 NOTE — PLAN OF CARE
"Problem: Pain, Acute (Adult)  Goal: Identify Related Risk Factors and Signs and Symptoms  Related risk factors and signs and symptoms are identified upon initiation of Human Response Clinical Practice Guideline (CPG).   Outcome: Improving  AVSS.  Neuros intact except for BLE weakness (4/5), burning sensation in bilateral calves, weak to moderate D/P flexion, and pt. States that her feet feel like she's \"walking on baked potatoes wrapped in tinfoil.\"  Pt. States that the Gabapentin has been helpful for her burning sensation in her calves.  Ambulates with assist of one, GB, and a walker.  Pt. Is voiding spontaneously and she took a shower this am.  Report given to  ARU 5th floor nurse.  Pt. Left for ARU @ 1100 via [facility].      "

## 2018-01-07 NOTE — LETTER
Communication to Referring Provider                    Kaleigh Ziegler MRN# [id number]   YOB: 1955 Age: 62 year old     [facility] Acute Rehabilitation Unit   Discharge Summary     Date Admitted: 1/7/2018  Date Discharge: 1/16/2018    Discharge Diagnosis:   1. Acute inflammatory demyelinating polyneuropathy  2. Impaired mobility and activities of daily living  3. Neuropathic and musculoskeletal pain    Brief History of Presenting Illness and Hospital Course:  This is a 62 year old previously very healthy woman who developed weakness ultimately diagnosed with AIDP.  Underwent 5 day course of IVIG.  She transferred inpatient acute rehab 1/7 for complaints of cares.  Very pleased with functional progress Kaleigh has continued to dependently with a 2 wheeled walker.  Still limiting her pains in her lower extremities that seems a combination of neuropathic sensory demyelination but also muscle soreness.  Some adjustments to medications have been made as below.  She will have 1 or 2 visit home safety evaluation assessment by physical therapy then quick transition to outpatient physical therapy for ongoing support and recovery.  She is doing well with her activities of daily living.    Discharge Medications:   Kaleigh Ziegler   Home Medication Instructions MAXIMO:[id number]    Printed on:01/16/18 1218   Medication Information                      acetaminophen (TYLENOL) 500 MG tablet  Take 2 tablets (1,000 mg) by mouth 4 times daily as needed for mild pain or fever             amitriptyline (ELAVIL) 50 MG tablet  Take 1 tablet (50 mg) by mouth At Bedtime             cyclobenzaprine (FLEXERIL) 10 MG tablet  Take 1 tablet (10 mg) by mouth 3 times daily as needed for muscle spasms             diclofenac (VOLTAREN) 1 % GEL topical gel  Place 2 g onto the skin 3 times daily as needed for moderate pain             gabapentin (NEURONTIN) 300 MG capsule  Take 2-3 capsules  (600-900 mg) by mouth 3 times daily             melatonin 3 MG tablet  Take 1 tablet (3 mg) by mouth nightly as needed for sleep             menthol (ICY HOT) 5 % PTCH  Apply 2 patches topically 2 times daily as needed for muscle soreness             sennosides (SENOKOT) 8.6 MG tablet  Take 1-2 tablets by mouth 2 times daily as needed for constipation             traMADol (ULTRAM) 50 MG tablet  Take 0.5-1 tablets (25-50 mg) by mouth 3 times daily as needed for breakthrough pain               Discharge Instructions:    Active Diet Order      Vegetarian Diet      Diet   Activity: Recommend activities of daily living but some help with more physical aspects.  Further Instructions: In addition to the above outlined medications may also use heat and/or ice along with stretching massage and other modalities to help with her pain.    Follow up Appointments:  See her primary provider Kelle Greer within a few weeks to follow-up hospitalization.  She is given prescription 1 month supplies for the above meds provider if she still needs them.  Follow-up with neurology 2/21/18.    Discharge Disposition: home with some support from .    Total Discharge time spent is > 30 minutes.

## 2018-01-07 NOTE — PLAN OF CARE
Problem: Patient Care Overview  Goal: Plan of Care/Patient Progress Review  Occupational Therapy Discharge Summary    Reason for therapy discharge:    Discharged to acute rehabilitation facility.    Progress towards therapy goal(s). See goals on Care Plan in [facility] electronic health record for goal details.  Goals not met.  Barriers to achieving goals:   discharge from facility.    Therapy recommendation(s):    Continued therapy is recommended.  Rationale/Recommendations:  progress strength for ADL and mobility independence.

## 2018-01-07 NOTE — H&P
PHYSICAL MEDICINE AND REHABILITATION ADMISSION NOTE      DATE OF ADMISSION:  01/07/2018       HISTORY OF PRESENT ILLNESS:  Ms. Kaleigh Ziegler is a delightful 62-year-old woman from [place], [place].  She was admitted to [facility] on 01/01/2018.  The patient reports that she started to be getting some symptoms in mid December with some bilateral posterior severe calf pain that later involved her posterior thighs and her low back.  A day or 2 later, she noted some numbness of her feet that progressed over a few days up to the ankles, then stopped progressing proximally.  The pain became worse over time, eventually limiting her ambulation.  Her legs became weak.  Ultimately, she was admitted to the [facility] where she was on the Neurology Service.  Please see the discharge summary from Dr. Ugalde for details of her acute stay on the Neurology Service.  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      The patient notes she is still quite weak.  She is using a front wheeled walker.  She has deficits in ADLs as well.  Fortunately, she notes no incontinence of bowel or bladder at this point in time.  She is having no difficulty with breathing, no difficulty with irregular heartbeat.  Appetite is good.  She does have some neuropathic type pain, particular in the lower limbs to the knees on down.  She is on gabapentin, which does help with that.  She also has some tingling in the distal arms and hands as well.      PAST MEDICAL HISTORY:  Quite benign.  The patient is a health oriented individual.  She has a vegan diet and she exercised consistently a couple of hours per day prior to the onset of her problems.      FAMILY HISTORY:  Noncontributory.      SOCIAL HISTORY:  The patient is .  She and her  live in [place].  She has 3 degrees.  She got a degree in education at the [employer], a  BS.  She got a master's degree at the [employer] in 1985 in outdoor education and recreation.  She received a doctoral degree from the [employer] in 2002 in critical pedagogy.  She very much enjoys a physical, active lifestyle, she enjoys traveling.  In fact, she is hoping to be able to go to [place] sometime in February just to go lie on the beach and take it easy.      In general, the patient does exercise 2 hours per day.  She gets up in the morning doing stretching and yoga.  She will do strengthening exercises.  She swims at a health club.  She began paddle boarding this year.  She is a member of the [employer]      There are 2 steps to enter the house and 5 steps up to the bedroom at home.  Prior to her illness, she was totally independent in all activities.      REVIEW OF SYSTEMS:  Negative other than as noted above.      PHYSICAL EXAMINATION:   GENERAL:  Kaleigh is a delightful 62-year-old woman, alert, oriented and cooperative, in no acute distress.   VITAL SIGNS:  Temperature 98.6 degrees Fahrenheit, heart rate 72, blood pressure 113/71, respirations 20, 98% saturation on room air, weight 63 kg, height 5 feet 8-1/4 inches.   HEENT:  Normocephalic, atraumatic.   NECK:  Supple, no jugular venous distention.   LUNGS:  Clear with deep inspiration and expiration.   CARDIAC:  Regular rate and rhythm by radial artery palpation.   ABDOMEN:  Nondistended, nontender.   EXTREMITIES:  No clubbing, cyanosis or edema noted.   NEUROLOGIC:  The patient is alert, oriented and cooperative.  She has no difficulties with cognition or with speech.  Sensation:  She does have some paresthesias of the distal arms and into the hands and more paresthetic type sensations in both lower limbs from the knees on down.  Manual muscle testing reveals low grade 4 strength in bilateral upper limb musculature.  In lower limb muscles she has 4- strength for hip flexion, extension, abduction, adduction, knee  extension and flexion, ankle plantar and dorsiflexion.  No heel cord contractures noted at this time.      POST-ADMISSION PHYSICIAN EVALUATION:  I have compared Ms. Ziegler's condition on admission to acute rehabilitation to that outlined in the preadmission screen history and physical examination performed by me.  No significant differences are identified and the patient remains appropriate for an inpatient rehabilitation facility level of care to manage medical issues and address functional impairments due to Guillain-[place] syndrome, otherwise known as acute inflammatory demyelinating polyradiculoneuropathy.  The patient has deficits in mobility and ADLs.      Comorbid medical conditions managed really related to her acute condition, her past medical history pretty unremarkable.        Prior functional, she was totally independent.      Present function, patient is walking with a front-wheeled walker with minimal assistance to contact guard.  The patient is limited by strength, endurance and balance.      The patient has significant deficits in ADLs as well, requiring contact guard to minimal assist.      The patient is currently on a regular diet.  She is a vegan and would like a vegan diet.      ANTICIPATED COURSE OF REHABILITATION:  It is anticipated that the patient with intensive therapies and close medical management and rehabilitation nursing care, will improve in strength, balance, safety awareness, tolerance to allow for modified independence with basic mobility and ADLs with probably ongoing standby assistance for stairs and showering once at home.  The patient will have increased family assistance initially at discharge.  She will probably require outpatient PT and OT.  Estimated length of stay approximately 1 week to 12 days.      The patient will benefit from intensive rehabilitation including 90 minutes each of physical and occupational therapy along with rehabilitation nursing and close management by  Psychiatry.         JORGE LUIS PRIETO MD             D: 2018 12:34   T: 2018 13:35   MT: CAMRON      Name:     JULIANN KOO   MRN:      [id number]        Account:      [id number]   :      1955           Admitted:     [id number]      Document: [id number]

## 2018-01-07 NOTE — PLAN OF CARE
Problem: Patient Care Overview  Goal: Plan of Care/Patient Progress Review  Physical Therapy Discharge Summary    Reason for therapy discharge:    Discharged to acute rehabilitation facility.    Progress towards therapy goal(s). See goals on Care Plan in [facility] electronic health record for goal details.  Goals partially met.  Barriers to achieving goals:   discharge from facility.    Therapy recommendation(s):    Continued therapy is recommended.  Rationale/Recommendations:  Patient is highly motivated to participate in therapies, has been making improvements in her gait tolerance, and was previously IND at baseline. .

## 2018-01-07 NOTE — IP AVS SNAPSHOT
[facility]    [address] [facility] [address]    Phone:  [phone]                                       After Visit Summary   1/7/2018    Kaleigh Ziegler    MRN: [id number]           After Visit Summary Signature Page     I have received my discharge instructions, and my questions have been answered. I have discussed any challenges I see with this plan with the nurse or doctor.    ..........................................................................................................................................  Patient/Patient Representative Signature      ..........................................................................................................................................  Patient Representative Print Name and Relationship to Patient    ..................................................               ................................................  Date                                            Time    ..........................................................................................................................................  Reviewed by Signature/Title    ...................................................              ..............................................  Date                                                            Time

## 2018-01-07 NOTE — IP AVS SNAPSHOT
MRN:[id number]                      After Visit Summary   1/7/2018    Kaleigh Ziegler    MRN: [id number]           Thank you!     Thank you for choosing [facility] for your care. Our goal is always to provide you with excellent care. Hearing back from our patients is one way we can continue to improve our services. Please take a few minutes to complete the written survey that you may receive in the mail after you visit with us. Thank you!        Patient Information     Date Of Birth          1955        About your hospital stay     You were admitted on:  January 7, 2018 You last received care in the:   [facility]    You were discharged on:  January 16, 2018        Reason for your hospital stay       Pleased with functional gains during rehab and now able to discharge home.                  Who to Call     For medical emergencies, please call 911.  For non-urgent questions about your medical care, please call your primary care provider or clinic, [phone]          Attending Provider     Provider Specialty    Channing Gomez MD Physical Medicine and Rehab       Primary Care Provider Office Phone # Fax #    Heaven Shahana Rose [phone]      After Care Instructions     Activity       Your activity upon discharge: continue to use walker for support. Some additional assistance with stairs if you don't have railings.            Diet       Follow this diet upon discharge: regular diet                  Your next 10 appointments already scheduled     Feb 21, 2018 12:50 PM CST   (Arrive by 12:35 PM)   New Patient Visit with Sachin Allen MD   [facility] Neurology ([facility])    [address] [place] [address]  3rd Floor  [facility] [address] [phone]              Additional Services     Home Care PT Referral for Hospital Discharge       PT to eval and treat  Home safety evaluation probably just 1-2 visits to optimize set up, equipment etc  with transition to outpatient PT shortly after.      Your provider has ordered home care - physical therapy. If you have not been contacted within 2 days of your discharge please call the department phone number listed on the top of this document.            Physical Therapy Referral       [facility] provides Physical Therapy evaluation and treatment and many specialty services across the [facility].     If you have not heard from the scheduling office within 2 business days, please call [phone]     Treatment: Evaluation & Treatment  Special Instructions/Modalities: AIDP with initial inpatient rehab. Short 1-2 visit home safety PT then outpatient PT at [facility].    Please be aware that coverage of these services is subject to the terms and limitations of your health insurance plan.  Call member services at your health plan with any benefit or coverage questions.                  Further instructions from your care team       Follow up appointments:     -- Dr. Heaven Rose M.D. Within a few weeks.  Please call [phone] to schedule your appointment with Dr. Rose.    Address  [facility] [address]; [address]                          [place] [address]  Phone   [phone]  Fax                  [phone]    -- Neurology Dr. Sachin Allen MD at [facility] Neurology PGY3 in 1-2 months (outlined by Dr. Allen from inpatient stay to see him)  You are scheduled to see Dr. Allen on February 21st at 12:50 pm.      HOME CARE  [facility] [phone] will provide PT. They will call you after you discharge to schedule the first visit.       Pending Results     No orders found from 1/5/2018 to 1/8/2018.            Statement of Approval     Ordered          01/16/18 0955  I have reviewed and agree with all the recommendations and orders detailed in this document.  EFFECTIVE NOW     Approved and electronically signed by:   "Channing Gomez MD             Admission Information     Date & Time Provider Department Dept. Phone    2018 Channing Gomez MD  ACUTE REHAB [phone]      Your Vitals Were     Blood Pressure Pulse Temperature Respirations Height Weight    117/64 (BP Location: Left arm) 114 98  F (36.7  C) (Oral) 18 1.734 m (5' 8.25\") 63.4 kg (139 lb 12.8 oz)    Pulse Oximetry BMI (Body Mass Index)                98% 21.1 kg/m2          [employer] Information     [employer] lets you send messages to your doctor, view your test results, renew your prescriptions, schedule appointments and more. To sign up, go to [email or web address] . Click on \"Log in\" on the left side of the screen, which will take you to the Welcome page. Then click on \"Sign up Now\" on the right side of the page.     You will be asked to enter the access code listed below, as well as some personal information. Please follow the directions to create your username and password.     Your access code is: [email or web address]  Expires: 3/24/2018 12:54 AM     Your access code will  in 90 days. If you need help or a new code, please call your [facility] clinic or [phone].        Care EveryWhere ID     This is your Care EveryWhere ID. This could be used by other organizations to access your [facility] medical records  [id number]        Equal Access to Services     LUCAS MEANS AH: Hadii amairani pressleyo Jewel, waaxda luqadaha, qaybta kaalmada sam, bryan cabrera. So [facility] [phone].    ATENCIÓN: Si habla español, tiene a laughlin disposición servicios gratuitos de asistencia lingüística. Llame al [phone].    We comply with applicable federal civil rights laws and [place] laws. We do not discriminate on the basis of race, color, national origin, age, disability, sex, sexual orientation, or gender identity.               Review of your medicines      START taking        Dose / Directions    acetaminophen 500 MG tablet   Commonly " known as:  TYLENOL   Used for:  Neuropathic pain, Muscle pain        Dose:  1000 mg   Take 2 tablets (1,000 mg) by mouth 4 times daily as needed for mild pain or fever   Quantity:  60 tablet   Refills:  0       amitriptyline 50 MG tablet   Commonly known as:  ELAVIL        Dose:  50 mg   Take 1 tablet (50 mg) by mouth At Bedtime   Quantity:  30 tablet   Refills:  0       cyclobenzaprine 10 MG tablet   Commonly known as:  FLEXERIL   Used for:  Muscle pain        Dose:  10 mg   Take 1 tablet (10 mg) by mouth 3 times daily as needed for muscle spasms   Quantity:  60 tablet   Refills:  0       diclofenac 1 % Gel topical gel   Commonly known as:  VOLTAREN   Used for:  Muscle pain        Dose:  2 g   Place 2 g onto the skin 3 times daily as needed for moderate pain   Quantity:  100 g   Refills:  0       melatonin 3 MG tablet   Used for:  Primary insomnia        Dose:  3 mg   Take 1 tablet (3 mg) by mouth nightly as needed for sleep   Refills:  0       menthol 5 % Ptch   Commonly known as:  ICY HOT   Used for:  Neuropathic pain, Muscle pain        Dose:  2 patch   Apply 2 patches topically 2 times daily as needed for muscle soreness   Quantity:  30 patch   Refills:  0       sennosides 8.6 MG tablet   Commonly known as:  SENOKOT   Used for:  Slow transit constipation        Dose:  1-2 tablet   Take 1-2 tablets by mouth 2 times daily as needed for constipation   Quantity:  60 tablet   Refills:  0         CONTINUE these medicines which may have CHANGED, or have new prescriptions. If we are uncertain of the size of tablets/capsules you have at home, strength may be listed as something that might have changed.        Dose / Directions    gabapentin 300 MG capsule   Commonly known as:  NEURONTIN   This may have changed:    - medication strength  - how much to take  - when to take this  - reasons to take this  - Another medication with the same name was removed. Continue taking this medication, and follow the directions you see  here.   Used for:  Acute inflammatory demyelinating polyneuropathy (H), Neuropathic pain        Dose:  600-900 mg   Take 2-3 capsules (600-900 mg) by mouth 3 times daily   Quantity:  270 capsule   Refills:  0       traMADol 50 MG tablet   Commonly known as:  ULTRAM   This may have changed:    - how much to take  - when to take this  - reasons to take this   Used for:  Neuropathic pain, Muscle pain        Dose:  25-50 mg   Take 0.5-1 tablets (25-50 mg) by mouth 3 times daily as needed for breakthrough pain   Quantity:  30 tablet   Refills:  0         STOP taking     HYDROmorphone 2 MG tablet   Commonly known as:  DILAUDID                Where to get your medicines      These medications were sent to [facility] [place], [place] - [address]  [address] [address]     Phone:  [phone]     acetaminophen 500 MG tablet    amitriptyline 50 MG tablet    cyclobenzaprine 10 MG tablet    diclofenac 1 % Gel topical gel    gabapentin 300 MG capsule    menthol 5 % Ptch    sennosides 8.6 MG tablet         Some of these will need a paper prescription and others can be bought over the counter. Ask your nurse if you have questions.     Bring a paper prescription for each of these medications     traMADol 50 MG tablet       You don't need a prescription for these medications     melatonin 3 MG tablet                Protect others around you: Learn how to safely use, store and throw away your medicines at www.disposemymeds.org.             Medication List: This is a list of all your medications and when to take them. Check marks below indicate your daily home schedule. Keep this list as a reference.      Medications           Morning Afternoon Evening Bedtime As Needed    acetaminophen 500 MG tablet   Commonly known as:  TYLENOL   Take 2 tablets (1,000 mg) by mouth 4 times daily as needed for mild pain or fever   Last time this was given:  1,000 mg on 1/16/2018  1:39 PM                                    amitriptyline 50 MG tablet   Commonly known as:  ELAVIL   Take 1 tablet (50 mg) by mouth At Bedtime                                   cyclobenzaprine 10 MG tablet   Commonly known as:  FLEXERIL   Take 1 tablet (10 mg) by mouth 3 times daily as needed for muscle spasms   Last time this was given:  10 mg on 1/15/2018  9:12 PM                                   diclofenac 1 % Gel topical gel   Commonly known as:  VOLTAREN   Place 2 g onto the skin 3 times daily as needed for moderate pain   Last time this was given:  2 g on 1/15/2018  6:31 AM                                   gabapentin 300 MG capsule   Commonly known as:  NEURONTIN   Take 2-3 capsules (600-900 mg) by mouth 3 times daily   Last time this was given:  600 mg on 1/14/2018  8:01 AM            8:00 AM       2:00 PM       8:00 PM               melatonin 3 MG tablet   Take 1 tablet (3 mg) by mouth nightly as needed for sleep   Last time this was given:  3 mg on 1/15/2018  9:13 PM                            As needed at bedtime for sleep. Take around 6pm or 7pm.       menthol 5 % Ptch   Commonly known as:  ICY HOT   Apply 2 patches topically 2 times daily as needed for muscle soreness   Last time this was given:  2 patches on 1/15/2018 10:20 PM                                   sennosides 8.6 MG tablet   Commonly known as:  SENOKOT   Take 1-2 tablets by mouth 2 times daily as needed for constipation   Last time this was given:  8.6 mg on 1/16/2018  7:56 AM            8:00 AM           8:00 PM               traMADol 50 MG tablet   Commonly known as:  ULTRAM   Take 0.5-1 tablets (25-50 mg) by mouth 3 times daily as needed for breakthrough pain   Last time this was given:  50 mg on 1/15/2018  3:31 AM

## 2018-01-07 NOTE — PLAN OF CARE
Problem: Patient Care Overview  Goal: Plan of Care/Patient Progress Review  Outcome: Improving  Here with presumed GBS. AVSS. A&Ox4. Neuros: BLE weakness (4/5) and numb/ burning sensation, moderate dorsi/ plantar flexion, and bilateral pinkies tingling . Pain moderately controlled with prn Diluadid and Tylenol. Voiding without difficulty in bathroom. Pt has red, kyle able area on coccyx aquaphor applied and weight shifted off of bottom, and pt frequently repositions self. PIV SL. Up SBA. Regular diet. Plan for discharge to ARU today via [facility] at 1100. Cont to monitor and with POC.

## 2018-01-08 ENCOUNTER — CARE COORDINATION (OUTPATIENT)
Dept: CARE COORDINATION | Facility: CLINIC | Age: 63
End: 2018-01-08

## 2018-01-08 PROCEDURE — 40000193 ZZH STATISTIC PT WARD VISIT

## 2018-01-08 PROCEDURE — 97110 THERAPEUTIC EXERCISES: CPT | Mod: GP

## 2018-01-08 PROCEDURE — 97535 SELF CARE MNGMENT TRAINING: CPT | Mod: GO

## 2018-01-08 PROCEDURE — 25000132 ZZH RX MED GY IP 250 OP 250 PS 637: Performed by: PHYSICAL MEDICINE & REHABILITATION

## 2018-01-08 PROCEDURE — 97163 PT EVAL HIGH COMPLEX 45 MIN: CPT | Mod: GP

## 2018-01-08 PROCEDURE — 12800006 ZZH R&B REHAB

## 2018-01-08 PROCEDURE — 97116 GAIT TRAINING THERAPY: CPT | Mod: GP

## 2018-01-08 PROCEDURE — 97166 OT EVAL MOD COMPLEX 45 MIN: CPT | Mod: GO

## 2018-01-08 PROCEDURE — 97530 THERAPEUTIC ACTIVITIES: CPT | Mod: GP

## 2018-01-08 PROCEDURE — 25000128 H RX IP 250 OP 636: Performed by: PHYSICAL MEDICINE & REHABILITATION

## 2018-01-08 PROCEDURE — 25000132 ZZH RX MED GY IP 250 OP 250 PS 637: Performed by: PHYSICIAN ASSISTANT

## 2018-01-08 PROCEDURE — 40000133 ZZH STATISTIC OT WARD VISIT

## 2018-01-08 RX ORDER — GABAPENTIN 300 MG/1
600 CAPSULE ORAL 3 TIMES DAILY
Status: DISCONTINUED | OUTPATIENT
Start: 2018-01-08 | End: 2018-01-09

## 2018-01-08 RX ORDER — ACETAMINOPHEN 325 MG/1
325 TABLET ORAL EVERY 4 HOURS PRN
Status: DISCONTINUED | OUTPATIENT
Start: 2018-01-08 | End: 2018-01-09

## 2018-01-08 RX ADMIN — TRAMADOL HYDROCHLORIDE 50 MG: 50 TABLET, COATED ORAL at 01:32

## 2018-01-08 RX ADMIN — GABAPENTIN 600 MG: 300 CAPSULE ORAL at 14:23

## 2018-01-08 RX ADMIN — GABAPENTIN 400 MG: 300 CAPSULE ORAL at 09:09

## 2018-01-08 RX ADMIN — ENOXAPARIN SODIUM 40 MG: 40 INJECTION SUBCUTANEOUS at 09:10

## 2018-01-08 RX ADMIN — GABAPENTIN 600 MG: 300 CAPSULE ORAL at 20:08

## 2018-01-08 RX ADMIN — GABAPENTIN 300 MG: 300 CAPSULE ORAL at 01:23

## 2018-01-08 RX ADMIN — HYDROMORPHONE HYDROCHLORIDE 2 MG: 2 TABLET ORAL at 06:38

## 2018-01-08 NOTE — PROGRESS NOTES
01/08/18 1100   Quick Adds   Type of Visit Initial PT Evaluation      Language English   Living Environment   Lives With spouse   Living Arrangements house   Home Accessibility stairs within home;stairs to enter home;bed and bath are not on the first floor   Number of Stairs to Enter Home 2   Number of Stairs Within Home 12   Stair Railings at Home inside, present on right side   Transportation Available car;family or friend will provide   Self-Care   Dominant Hand right   Usual Activity Tolerance excellent   Current Activity Tolerance moderate   Regular Exercise yes   Activity/Exercise Type running/jogging;strength training   Exercise Amount/Frequency daily   Activity/Exercise/Self-Care Comment Very active previously.    Functional Level Prior   Ambulation 0-->independent   Transferring 0-->independent   Toileting 0-->independent   Bathing 0-->independent   Dressing 0-->independent   Eating 0-->independent   Communication 0-->understands/communicates without difficulty   Swallowing 0-->swallows foods/liquids without difficulty   Cognition 0 - no cognition issues reported   Fall history within last six months yes   Number of times patient has fallen within last six months 1   Which of the above functional risks had a recent onset or change? ambulation;transferring;toileting;bathing;dressing   Prior Functional Level Comment PLOF IND, working full time. Energetic and active.    General Information   Onset of Illness/Injury or Date of Surgery - Date 01/01/18   Referring Physician Dr. Channing Gomez   Patient/Family Goals Statement Pt wants to return to home  and to previously active lifestyle.    Pertinent History of Current Problem (include personal factors and/or comorbidities that impact the POC) Per chart: 62-year-old woman from [place], [place].  She was admitted to [facility] on 01/01/2018.  The patient reports that she started to be getting some symptoms in mid December  with some bilateral posterior severe calf pain that later involved her posterior thighs and her low back.  A day or 2 later, she noted some numbness of her feet that progressed over a few days up to the ankles, then stopped progressing proximally.  The pain became worse over time, eventually limiting her ambulation.  Her legs became weak.  Ultimately, she was admitted to the [facility] where she was on the Neurology Service.  Please see the discharge summary from Dr. Ugalde for details of her acute stay on the Neurology Service.  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    Precautions/Limitations fall precautions   Heart Disease Risk Factors Age   General Observations Pt is very pleasant, talkative, and agreeable to PT.   Cognitive Status Examination   Orientation orientation to person, place and time   Level of Consciousness alert   Follows Commands and Answers Questions 100% of the time   Personal Safety and Judgment intact   Memory intact   Pain Assessment   Patient Currently in Pain No  (but does report evening pain)   Posture    Posture Not impaired   Range of Motion (ROM)   ROM Comment All AROM and PROM in BLE and BUE WFL.    Strength   Strength Comments Generalized weakness 3+/5 in RLE and 4-/5 in LLE.    ARC Assessment Only   Acute Rehab FIM See FIM scores for Mobility/ADL Assessment   Balance   Balance Comments Able to sit and stand unsupported for short periods of time. She does require walker for stability in standing and with ambulation.   Sensory Examination   Sensory Perception Comments C/o allodynia in B feet, and increased pain at night in calves. She has slight proprioceptive deficits throughout BLE.    Coordination   Coordination Comments Mild impairments in finger to nose and shin scrape test B.    Muscle Tone   Muscle Tone no deficits were identified   Modality Interventions   Planned Modality Interventions Comments None  indicated   General Therapy Interventions   Planned Therapy Interventions balance training;strengthening;stretching;ROM;neuromuscular re-education;gait training;bed mobility training;progressive activity/exercise;home program guidelines;risk factor education;wheelchair management/propulsion training;transfer training;groups;manual therapy;motor coordination training   Intervention Comments Pt is below baseline for functional mobility after diagnosis and treatment for GBS. She currently has deficits in strength, balance, gait, coordination, and sensation. She requires frequent feedback and verbal correction during ambulation and other mobility tasks using walker. Her biggest barrier to home is safety and IND with ambulation and stairs. She does have supportive family and community, but will need to be MOD I for all in home mobility before d/c. She is motivated and eager to participate fully in therapies. Rehab potential is good.   Clinical Impression   Criteria for Skilled Therapeutic Intervention yes, treatment indicated   PT Diagnosis difficulty walking   Influenced by the following impairments see intervention comments   Functional limitations due to impairments needs close CGA or assist for all mobility    Clinical Presentation Unstable/Unpredictable   Clinical Presentation Rationale High complexity d/t multiple systems affected, decreased body awareness, and SAURAV home without rail.    Clinical Decision Making (Complexity) High complexity   Therapy Frequency` daily   Predicted Duration of Therapy Intervention (days/wks) 8 days   Anticipated Equipment Needs at Discharge walker   Anticipated Discharge Disposition Home;Home with Outpatient Therapy   Risk & Benefits of therapy have been explained Yes   Patient, Family & other staff in agreement with plan of care Yes   Total Evaluation Time   Total Evaluation Time (Minutes) 30

## 2018-01-08 NOTE — PROGRESS NOTES
"CLINICAL NUTRITION SERVICES - ASSESSMENT NOTE     Nutrition Prescription    RECOMMENDATIONS FOR MDs/PROVIDERS TO ORDER:  None at this time.    Malnutrition Status:    Non-severe malnutrition in context of acute illness    Recommendations already ordered by Registered Dietitian (RD):  None at this time.    Future/Additional Recommendations:  Monitor PO intake  Re-offer snacks or supplements between meals if suspect PO intake inadequate or further weight decline     REASON FOR ASSESSMENT  Kaleigh Ziegler is a/an 62 year old female assessed by the dietitian for Admission Nutrition Risk Screen for unintentional loss of 10# or more in the past two months    NUTRITION HISTORY  Obtained from EMR:  -transferred from [facility] (admit 1/1/18-1/7/18); dx: acute inflammatory demyelinating polyneuropathy  -minimal medical hx other than AIDP  -was followed by RD on [place]. Noted in nutrition history: \"Her progressive weakness and pain contributed to her decreased intake over the last month. She states she started feeling poorly about 3 weeks ago and was only able to eat about 50% of her normal intake.\"    -Per pt report: \"She notes her mouth has some sores that makes eating acidic foods painful. Pt states her appetite is back since admission, she feels like eating, and she likes the food available to her.\"     Obtained from pt:  -pt is vegan and gluten-free by choice at baseline, but during admission wishes to be only vegetarian so she isn't as limited with her meal options. Reports eating 3 meals/day, 100% of meals, along with snacks. Supplements include B12 and adds nutrition flakes to meals to get all B-vitamins. Loves smoothies and uses pea protein and sometimes tumeric; doesn't eat soy except for tofu. Overall loves to cook and used to work out 2 hours/day.  -once pain started about a month ago, pt intake decreased to 1 meal/day, which sometimes consisted of only half a banana. Portions were overall smaller " "compared to baseline intake, but she was still eating 100% of any meal she ate because she knew she needed to eat.    CURRENT NUTRITION ORDERS  Diet: Vegetarian  Intake/Tolerance: pt  brought food from home on 1/7/18 for dinner and per flow sheet pt ate 100%; also ate 100% of lunch on that same date; had breakfast and lunch ordered on 1/8 and pt reports eating 100% of each. This provides 495 kcal and 22 g protein (31% of calorie needs and 34 g protein). Pt demonstrated she has some ingredients to make smoothies at bedside; offered Ensure to help increase protein and calories and she said \"Oh no way I'll throw it out the window.\"  -reports appetite is back to baseline    LABS  Labs reviewed    MEDICATIONS  Medications reviewed    ANTHROPOMETRICS  Height: 173.4 cm (5' 8.25\")  Most Recent Weight: 63 kg (138 lb 14.4 oz)    IBW: 64 kg  BMI: Normal BMI  Weight History:   Wt Readings from Last 10 Encounters:   01/07/18 63 kg (138 lb 14.4 oz)   01/02/18 65.9 kg (145 lb 4.5 oz)   12/23/17 67.8 kg (149 lb 7.6 oz)     -reports losing 12 pounds (unintentionally) from 12/19/2017 to [facility]\"" on [place], mainly d/t terrible pain in lower extremities which was a barrier to her eating (hard to eat while in pain). Has noticed her \"stomach is lighter\" but no apparent weight loss anywhere else on her person.   -per chart review, pt has lost 11 pounds (7.4%) in about 2 weeks, and 4.8% in 5 days. This could be due to being in a hospital setting as well as dietary restrictions by choice and therefore may not be eating adequately.    -pt reports UBW of 145-150 lbs    Dosing Weight: 63 kg (admit wt)    ASSESSED NUTRITION NEEDS  Estimated Energy Needs: 5097-3398 kcals/day (25 - 30 kcals/kg)  Justification: Maintenance  Estimated Protein Needs: 63-76 grams protein/day (1 - 1.2 grams of pro/kg)  Justification: Maintenance  Estimated Fluid Needs: 1592-5483 mL/day (1 mL/kcal)   Justification: Maintenance    PHYSICAL FINDINGS  See " malnutrition section below.    MALNUTRITION  % Intake: < 75% for > 7 days (non-severe)  % Weight Loss: > 2% in 1 week (severe)  Subcutaneous Fat Loss: None observed  Muscle Loss: None observed  Fluid Accumulation/Edema: None noted  Malnutrition Diagnosis: Non-severe malnutrition in the context of acute illness    NUTRITION DIAGNOSIS  Inadequate nutrient intake (calories and protein) related to vegan/restricted diet and hx of poor appetite with pain in LE as evidenced by weight loss of 4.8% in 5 days and decreased oral intake PTA per pt report.    INTERVENTIONS  Implementation  Nutrition Education: encouraged pt to consume at least 1 protein source at each meal   Collaboration with other nutrition professionals: discussed POC with Violette ARIAS     Goals  Patient to consume % of nutritionally adequate meal trays TID, or the equivalent with supplements/snacks.  No further weight loss beyond 63 kg     Monitoring/Evaluation  Progress toward goals will be monitored and evaluated per protocol.      Treasure Murphy - dietetic intern  Pager: [phone]    I have reviewed and agree with above assessment and plan of care.    Emely Coleman RD, LD

## 2018-01-08 NOTE — PROVIDER NOTIFICATION
Social Work: Initial Assessment with Discharge Plan    Patient Name: Kaleigh Ziegler  : 1955  Age: 62 year old  MRN: [id number]  Completed assessment with: patient  Admitted to ARU: 18    Presenting Information   Date of SW assessment: 2018  Health Care Directive: Patient considering completing  Primary Health Care Agent: default to next of kin  Secondary Health Care Agent: NA  Living Situation: resides with her   Previous Functional Status: previously independent of cares  DME available: see therapy notes  Patient and family understanding of hospitalization: Yes  Cultural/Language/Spiritual Considerations: English speaking      Physical Health  Reason for admission: Acute Inflammatory Demyelinating Polyneuropathy    Provider Information   Primary Care Physician:Heaven Rose MD  : none    Mental Health/Chemical Dependency:   Diagnosis: denied concerns  Alcohol/Tobacco/Narcotis: denied  Support/Services in Place: none  Services Needed/Recommended: none  Sexuality/Intimacy: denied concerns    Support System  Marital Status: ,  Saul  Family support: denied  Other support available: none    Community Resources  Current in home services: none  Previous services: none    Financial/Employment/Education  Employment Status: works  Income Source: employment  Education: unknown  Financial Concerns:  Denied concerns  Insurance: 2sms      Discharge Plan   Patient and family discharge goal: Goal to return home with continued family support.   Provided Education on discharge plan: Yes  Patient agreeable to discharge plan:  Yes  Provided education and attained signature for Medicare IM and IRF Patient Rights and Privacy Information provided to patient : No  Provided patient with [place] [facility] Resources: No  Barriers to discharge: Medically stable and progress with therapies    Discharge Recommendations   Disposition: Goal to return home  with continued support from family  Transportation Needs: family will provide  Name of Transportation Company and Phone: NA    Additional comments   Pt is a 62 yr old female admitted for an acute rehab stay due to demyelinating polyneuropathy. Pt resides with her , Saul. Goal to return home with his continued support. Team working towards a safe d/c plan.     Please invite to Care Conference:  Saul (spouse) - [phone]      FRANCK Reich, [facility]  [place]   Phone: [phone]  Pager: [phone]         01/08/18 151   Living Arrangements   Lives With spouse   Living Arrangements house   Able to Return to Prior Living Arrangements yes   Home Safety   Patient Feels Safe Living in Home? yes   Discharge Planning   Anticipated Discharge Disposition home with assist   Discharge Needs Assessment   Medical Team notified of plan? yes   Readmission Within The Last 30 Days no previous admission in last 30 days   Equipment Currently Used at Home walker, rolling   Transportation Available family or friend will provide

## 2018-01-08 NOTE — PLAN OF CARE
Problem: Goal/Outcome  Goal: Goal Outcome Summary  OT: Evaluation completed. Patient very motivated to participate in therapies with good social support for return home. Patient with decreased independence with ADLs/IADls due to weakness, LB > UB and decreased activity tolerance.   Estimated Length of Stay: 8 days   Equipment needs: shower chair, grab bars

## 2018-01-08 NOTE — PLAN OF CARE
Problem: Patient Care Overview  Goal: Plan of Care/Patient Progress Review  PT eval completed today. Pt is below baseline for functional mobility after diagnosis and treatment for GBS. She currently has deficits in strength, balance, gait, coordination, and sensation. She requires frequent feedback and verbal correction during ambulation and other mobility tasks using walker.     Her biggest barrier to home is safety and IND with ambulation and stairs. She does have supportive family and community, but will need to be MOD I for all in home mobility before d/c. She is motivated and eager to participate fully in therapies. Rehab potential is good.     ELOS 8 days to return to home with OP PT.

## 2018-01-08 NOTE — PLAN OF CARE
Problem: PT General Care Plan  Goal: Aerobic Activity w/Stable CV Response (PT)  PT Goal: Aerobic Activity with Stable Cardiovascular Response  Outcome: Change based on patient need/priority Date Met: 01/08/18

## 2018-01-08 NOTE — PROGRESS NOTES
Patient was discharged to [facility] so they will handle post discharge follow up cares and coordination            Discharge Disposition:   ARU:  [facility]

## 2018-01-08 NOTE — PROGRESS NOTES
"  [facility]   Acute Rehabilitation Unit  Daily progress note    interval history  Kaleigh Ziegler was seen and examined at bedside. Reports she is doing well, biggest complaint is numbness/tingling pain in all 4 extremities from fingers and toes extending proximally to knees and elbows.  Feet described as numb,  with burning sensation in calves, upper extremities more like pins and needles.  Otherwise eating and drinking without issues, no swallow concerns, no breathing concerns, no n/v/d, no bowel or bladder concerns.  Has some nasal congestion and would like to start some saline.       Functionally, undergoing therapy evals today anticipating 7-10 day course.         medications    gabapentin  600 mg Oral TID     Vitamin D3 capsule 2000 units (rice)-PATIENT'S OWN  2,000 Units Oral Daily     enoxaparin  40 mg Subcutaneous Q24H        acetaminophen, sodium chloride, senna-docusate, polyethylene glycol, gabapentin, traMADol, naloxone, HYDROmorphone     physical exam  /73 (BP Location: Left arm)  Pulse 97  Temp 96.5  F (35.8  C) (Oral)  Resp 16  Ht 1.734 m (5' 8.25\")  Wt 63 kg (138 lb 14.4 oz)  SpO2 97%  BMI 20.97 kg/m2  Gen: alert nad  Pulm: non labored on room air  Abd: soft non tender  Ext: warm dry without edema  Neuro/MSK: alert oriented face symmetric, 4/5 bilateral hip flexors, 4/5 bilateral knee extention/ flexion, 3/5 plantar flexion 2/5 dorsiflexion.  Impaired sensation reported as feet to knees, and ulnar distribution in hand extending to elbow    labs  No new labs    Rehabilitation - continue comprehensive acute inpatient rehabilitation program with multidisciplinary approach including therapies, rehab nursing, and physiatry following. See interval history for updates.      assessment and plan    Mrs. Kaleigh Ziegler  Is a 62 year old woman who presented with progressive bilateral le pain, and weakness, workup consistent with AIDP admitted to ARU " 1/7/2018 for ongoing rehabilitation.      AIDP- presented with progressive ble pain and weakness, noted to be areflexic on exam.  CSF with mild lymphocytic pleocytosis, EMG consistent with AIDP.  Received 5 day course of IVIG  - follow up EMG in ~ 2 weeks  - Pain- continue gabapentin titration- increased 600 mg tid, and prn gabapentin and dilaudid    Case discussed with Dr. Gomez.      Ellen Santos PA-C  Rehab Service  Pager: [phone]

## 2018-01-08 NOTE — PLAN OF CARE
Problem: Goal/Outcome  Goal: Goal Outcome Summary  Pt is alert and oriented x 3, she has neuropathic pain in upper and lower extremities, she is on gabapentin for that. She has redness on buttocks and scabbing skin on the right inner calf. She applied barrier cream to skin on buttocks independently. Pt claimed she had three bowel movements so far. She was independent with perineal care and clothing management while assisted for steadying her balance. We will continue to monitor skin and assist with activity of daily livings.

## 2018-01-08 NOTE — PROGRESS NOTES
01/08/18 1000   Quick Adds   Type of Visit Initial Occupational Therapy Evaluation   Living Environment   Lives With spouse   Living Arrangements house   Home Accessibility stairs within home;stairs to enter home;bed and bath are not on the first floor   Number of Stairs to Enter Home 2   Number of Stairs Within Home 12   Transportation Available car;family or friend will provide   Living Environment Comment lives in a 2 story home with her ; stairs to enter and stairs to second floor with shower and bed room. Plans to add grab bars and additional railing on stairs.    Self-Care   Dominant Hand right   Usual Activity Tolerance excellent   Current Activity Tolerance moderate   Regular Exercise yes   Activity/Exercise Type running/jogging;strength training  (classical stretch, yoga, )   Exercise Amount/Frequency daily   Equipment Currently Used at Home walker, rolling   Activity/Exercise/Self-Care Comment worked out daily at home; hiked daily with her current job role. Was working full time until early Dec   Functional Level Prior   Ambulation 0-->independent   Transferring 0-->independent   Toileting 0-->independent   Bathing 0-->independent   Dressing 0-->independent   Eating 0-->independent   Communication 0-->understands/communicates without difficulty   Swallowing 0-->swallows foods/liquids without difficulty   Cognition 0 - no cognition issues reported   Fall history within last six months yes   Number of times patient has fallen within last six months 1   Which of the above functional risks had a recent onset or change? ambulation;transferring;toileting;bathing;dressing   Prior Functional Level Comment Pt IND in all ADLs and IADLs prior to symptom onset in Dec 2017, since then has been using 4WW and assist from  with some ADLs (dressing, bathing)   General Information   Onset of Illness/Injury or Date of Surgery - Date 01/01/18   Referring Physician Dr Gomez    Patient/Family Goals Statement  manage pain and walking    Additional Occupational Profile Info/Pertinent History of Current Problem 63 y/o female; admitted to [facility] on 01/01/2018.  The patient reports that she started to be getting some symptoms in mid December with some bilateral posterior severe calf pain that later involved her posterior thighs and her low back.  A day or 2 later, she noted some numbness of her feet that progressed over a few days up to the ankles, then stopped progressing proximally.  The pain became worse over time, eventually limiting her ambulation.  Her legs became weak.  Ultimately, she was admitted to the [facility] where she was on the Neurology Service.  Please see the discharge summary from Dr. Ugalde for details of her acute stay on the Neurology Service.  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    Precautions/Limitations fall precautions   Cognitive Status Examination   Orientation orientation to person, place and time   Cognitive Comment OT: no changes noted. Oriented, pleasant, demonstrates good insight into deficits and plan for d/c home.    Visual Perception   Visual Perception Comments OT: no acute changes in vision. Wears glasses for reading.    Sensory Examination   Sensory Comments OT: pt reports numbness in B hands, ulnar n. distribultion. Impaired proprioception in B LEs but intact light touch. Overall, sensation in B UEs not impacting functional use.     Integumentary/Edema   Integumentary/Edema no deficits were identifed   Posture   Posture not impaired   Range of Motion (ROM)   ROM Quick Adds No deficits were identified   ROM Comment B UEs: full AROM    Strength   Strength Comments B UEs: 4/5 gross strength throughout    Hand Strength   Left hand  (pounds) 35 pounds   Right hand  (pounds) 40 pounds   Muscle Tone Assessment   Muscle Tone Quick Adds No deficits were identified    Coordination   Upper Extremity Coordination No deficits were identified   ARC Assessment Only   Acute Rehab FIM See FIM scores for Mobility/ADL Assessment   Activities of Daily Living Analysis   Impairments Contributing to Impaired Activities of Daily Living balance impaired;coordination impaired;pain;sensation decreased;sensory feedback impaired;strength decreased   General Therapy Interventions   Planned Therapy Interventions ADL retraining;IADL retraining;balance training;bed mobility training;motor coordination training;strengthening;transfer training;home program guidelines;progressive activity/exercise   Clinical Impression   Criteria for Skilled Therapeutic Interventions Met yes, treatment indicated   OT Diagnosis decreased independence with ADLs. IADLs, functional transfers.    Influenced by the following impairments decreased UB and LB strength, decreased acitvity tolerance; decreased sensation/ sensory feedback.    Assessment of Occupational Performance 3-5 Performance Deficits   Identified Performance Deficits Patient demonstrates the following deficits; dressing, bathing, bed mobility, short distance transfers/ mobility, driving, working, household management.    Clinical Decision Making (Complexity) Moderate complexity   Therapy Frequency daily   Predicted Duration of Therapy Intervention (days/wks) 8 days    Anticipated Equipment Needs at Discharge bathing equipment   Anticipated Discharge Disposition Home with Outpatient Therapy   Risks and Benefits of Treatment have been explained. Yes   Patient, Family & other staff in agreement with plan of care Yes   Clinical Impression Comments Patient very motivated with therapy, very active at baseline and has supportive family to assist upon discharge. Patient demonstrates overall generalized weakness with LB strength impacting overall IND with ADLs/ IADLs. Anticipate 10 day LOS; use of walker and shower chair for equipment at d/c and assist from family.     Total Evaluation Time   Total Evaluation Time (Minutes) 30

## 2018-01-08 NOTE — PLAN OF CARE
Problem: Individualization  Goal: Patient Individualization  Outcome: No Change  FOCUS/GOAL  Bladder management, Pain management and Mobility    ASSESSMENT, INTERVENTIONS AND CONTINUING PLAN FOR GOAL:  Pt restless at start of shift, c/o pain to L shoulder and legs. Pt given PRN dose of Neurontin for her legs, and Tramadol for shoulder pain. Pt also given hot pack for her shoulder pain, which pt felt was helpful. Pt is independent with repositioning herself in bed, and is able to ambulate into the BR with aid of her walker and CGA. Pt continent of bladder and independent with hygiene care and underwear management. Pt has light rash to bilateral buttocks, and applies aquaphor ointment to rash area.

## 2018-01-08 NOTE — PLAN OF CARE
"Problem: Goal/Outcome  Goal: Goal Outcome Summary  FOCUS/GOAL  Bowel management, Bladder management and Pain management    ASSESSMENT, INTERVENTIONS AND CONTINUING PLAN FOR GOAL:  Pt alert and oriented, pleasant and talkative. Spouse arrived this afternoon with dinner. They sat in living room area for about an hour. Pt states she was very happy about this because she has not been able to sit up that long since illness. Pt has been putting barrier/aquaphor to \"diaper rash\" to buttocks from wipes. Bilateral buttocks have dry, pink areas. Up with 1 and walker to toilet. 2 BMs this shift and no difficulty voiding. Able to do pericares herself. Denied pain most of shift up until 2000, started feeling pain in calves. Gabapentin administered with relief. Later, pain increased when transferring to toilet. Also pain in shoulders/neck and aching hands. Received massage with essential oils with relief. Dilaudid for calf pain described as burning fire ants. Pt states she might need ultram later as dilaudid only helps for a couple hours. PCDs are on, she does not think they are affecting pain.    "

## 2018-01-09 LAB
ANION GAP SERPL CALCULATED.3IONS-SCNC: 6 MMOL/L (ref 3–14)
BUN SERPL-MCNC: 13 MG/DL (ref 7–30)
CALCIUM SERPL-MCNC: 8.3 MG/DL (ref 8.5–10.1)
CHLORIDE SERPL-SCNC: 105 MMOL/L (ref 94–109)
CO2 SERPL-SCNC: 28 MMOL/L (ref 20–32)
CREAT SERPL-MCNC: 0.47 MG/DL (ref 0.52–1.04)
GFR SERPL CREATININE-BSD FRML MDRD: >90 ML/MIN/1.7M2
GLUCOSE SERPL-MCNC: 93 MG/DL (ref 70–99)
POTASSIUM SERPL-SCNC: 4.2 MMOL/L (ref 3.4–5.3)
SODIUM SERPL-SCNC: 139 MMOL/L (ref 133–144)

## 2018-01-09 PROCEDURE — 97535 SELF CARE MNGMENT TRAINING: CPT | Mod: GO

## 2018-01-09 PROCEDURE — 25000132 ZZH RX MED GY IP 250 OP 250 PS 637: Performed by: PHYSICIAN ASSISTANT

## 2018-01-09 PROCEDURE — 25000132 ZZH RX MED GY IP 250 OP 250 PS 637: Performed by: PHYSICAL MEDICINE & REHABILITATION

## 2018-01-09 PROCEDURE — 97530 THERAPEUTIC ACTIVITIES: CPT | Mod: GP

## 2018-01-09 PROCEDURE — 97116 GAIT TRAINING THERAPY: CPT | Mod: GP

## 2018-01-09 PROCEDURE — 36415 COLL VENOUS BLD VENIPUNCTURE: CPT | Performed by: PHYSICIAN ASSISTANT

## 2018-01-09 PROCEDURE — 97110 THERAPEUTIC EXERCISES: CPT | Mod: GP

## 2018-01-09 PROCEDURE — 40000133 ZZH STATISTIC OT WARD VISIT

## 2018-01-09 PROCEDURE — 80048 BASIC METABOLIC PNL TOTAL CA: CPT | Performed by: PHYSICIAN ASSISTANT

## 2018-01-09 PROCEDURE — 40000193 ZZH STATISTIC PT WARD VISIT

## 2018-01-09 PROCEDURE — 12800006 ZZH R&B REHAB

## 2018-01-09 RX ORDER — GABAPENTIN 300 MG/1
600 CAPSULE ORAL
Status: DISCONTINUED | OUTPATIENT
Start: 2018-01-09 | End: 2018-01-14

## 2018-01-09 RX ORDER — SENNOSIDES 8.6 MG
8.6 TABLET ORAL 2 TIMES DAILY
Status: DISCONTINUED | OUTPATIENT
Start: 2018-01-09 | End: 2018-01-16 | Stop reason: HOSPADM

## 2018-01-09 RX ORDER — ACETAMINOPHEN 325 MG/1
650 TABLET ORAL EVERY 4 HOURS PRN
Status: DISCONTINUED | OUTPATIENT
Start: 2018-01-09 | End: 2018-01-13

## 2018-01-09 RX ADMIN — ACETAMINOPHEN 650 MG: 325 TABLET, FILM COATED ORAL at 18:11

## 2018-01-09 RX ADMIN — TRAMADOL HYDROCHLORIDE 50 MG: 50 TABLET, COATED ORAL at 18:11

## 2018-01-09 RX ADMIN — Medication 900 MG: at 21:16

## 2018-01-09 RX ADMIN — SENNOSIDES 8.6 MG: 8.6 TABLET, FILM COATED ORAL at 21:16

## 2018-01-09 RX ADMIN — GABAPENTIN 600 MG: 300 CAPSULE ORAL at 15:27

## 2018-01-09 RX ADMIN — HYDROMORPHONE HYDROCHLORIDE 2 MG: 2 TABLET ORAL at 00:20

## 2018-01-09 RX ADMIN — Medication 1 MG: at 21:16

## 2018-01-09 RX ADMIN — GABAPENTIN 600 MG: 300 CAPSULE ORAL at 08:57

## 2018-01-09 RX ADMIN — SENNOSIDES 8.6 MG: 8.6 TABLET, FILM COATED ORAL at 09:05

## 2018-01-09 RX ADMIN — SENNOSIDES AND DOCUSATE SODIUM 1 TABLET: 8.6; 5 TABLET ORAL at 02:11

## 2018-01-09 RX ADMIN — TRAMADOL HYDROCHLORIDE 50 MG: 50 TABLET, COATED ORAL at 05:12

## 2018-01-09 RX ADMIN — GABAPENTIN 300 MG: 300 CAPSULE ORAL at 02:08

## 2018-01-09 RX ADMIN — ACETAMINOPHEN 650 MG: 325 TABLET, FILM COATED ORAL at 23:08

## 2018-01-09 NOTE — PLAN OF CARE
Problem: Goal/Outcome  Goal: Goal Outcome Summary  Outcome: Improving  See rounds and care conference note from 01/09/17

## 2018-01-09 NOTE — PLAN OF CARE
Acute Rehab Care Conference/Team Rounds      Type: Team Rounds    Present: Dr. Channing Gomez, Ellen Santos PA, Nirav Whatley RN, Aletha Cedillo SW, Africa Iglesias OT, Stella Dalton PT, Selvin Maher SLP,  Alexandra Granda , Chelsey Yo, Violette Coleman Dietician, Danica Son PT        Discharge Barriers/Treatment/Education    Rehab Diagnosis: AIDP    Active Medical Co-morbidities/Prognosis: neuropathic pain    Safety: She calls for assistance as needed, able to communicate her needs and direct her cares. CGA for transfer and ambulation with a walker.   She voiced worries about d/c to home, that her  will not be able to take care of her.    Pain: Left shoulder and calves pain. Gave her pain medication 3 times. Dialudid, Neurontin and Ultram. Patient found sleeping about 45 minutes after pain medication administration. When she woke up she said she was not really sleeping, she was resting. Her report of pain relief was inconsistent, some relief and no relief. She fells she slept poorly, she would like her first therapy to be scheduled mid AM. She wants a mild sleeping pill.    Medications, Skin, Tubes/Lines: She took pills whole with water. She wants Senokot scheduled instead of PRN. Red/scabbing areas on buttock, she applies barrier cream herself. No tubes/lines.    Swallowing/Nutrition:    Bowel/Bladder: Continent of B/B, LBM on 1/8. Uses toilet. Steading assistance provided  for toilet transfer.     Psychosocial: Pt resides with her . Goal to return home with continued family and friend support. Team working towards a safe d/c plan.     ADLs/IADLs: Evaluation completed yesterday. Patient very motivated to participate in therapies with good social support for return home. Patient with decreased independence with ADLs/IADls due to weakness, LB > UB and decreased activity tolerance. Estimated Length of Stay: 8 days; Anticipated independent with dressing, toileting, short distance  mobility by d/c, and mod I with bathing. Equipment needs: shower chair, grab bars     Mobility: PT neil completed yesterday. Pt is below baseline for functional mobility after diagnosis and treatment for GBS. She currently has deficits in strength, balance, gait, coordination, and sensation. She requires frequent feedback and verbal correction during ambulation and other mobility tasks using walker. Her biggest barrier to home is safety and IND with ambulation and stairs. She does have supportive family and community, but will need to be MOD I for all in home mobility before d/c. Equipment needs: tall walker    Cognition/Language: Cognition is intact, WFL; demonstrates good safety awareness     Community Re-Entry:    Transportation: would recommend assistance initially for driving due to LB weakness and decreased activity tolerance     Decision maker: self    Plan of Care and goals reviewed and updated.    Discharge Plan/Recommendations    Fall Precautions: continue    Overall plan for the patient: pleased with progress. Key difficulties remaining are stairs. Looking at having consistent up with walker by discharge. Working on better pain management. Looking at about 1 week stay. Care conference Monday 1/15 and home as early as Tue. Plan outpatient PT and possibly OT after d/c.      Utilization Review and Continued Stay Justification    Medical Necessity Criteria:    For any criteria that is not met, please document reason and plan for discharge, transfer, or modification of plan of care to address.    Requires intensive rehabilitation program to treat functional deficits?: Yes    Requires 3x per week or greater involvement of rehabilitation physician to oversee rehabilitation program?: Yes    Requires rehabilitation nursing interventions?: Yes    Patient is making functional progress?: Yes    There is a potential for additional functional progress? Yes    Patient is participating in therapy 3 hours per day a  minimum of 5 days per week or 15 hours per week in 7 day period?:Yes    Has discharge needs that require coordinated discharge planning approach?:Yes      Final Physician Sign off    Statement of Approval: I agree with all the recommendations detailed in this document.     Patient Goals     1. Pt will d/c to home/community setting upon completion of therapy/RN goals  2. Pt and family will be involved in d/c planning and made aware of services available to meet pts needs.         OT target date for goal attainment: 01/16/18  OT Frequency: daily,  minutes         OT goal: lower body dressing: Modified independent, including set-up/clothing retrieval  OT goal: upper body bathing: Modified independent, using adaptive equipment  OT goal: lower body bathing: Modified independent, using adaptive equipment  OT goal: bed mobility: Independent  OT Goal: transfer: Modified independent, with assistive device  OT goal: toilet transfer/toileting: Modified independent, using adaptive equipment  OT goal: meal preparation: Modified independent, with simple meal preparation  OT goal: cognitive: Patient/caregiver will verbalize understanding of cognitive assessment results/recommendations as needed for safe discharge planning  OT goal: perform aerobic activity with stable cardiovascular response: continuous activity, 20 minutes  OT goal 1: Pateint will be able to demonstrate walk in shower transfer with supervision with use of grab bar and shower chair   OT/MARCO face-to-face collaboration occurred, patient progress and plan of care reviewed.        PT target date for goal attainment: 01/16/18  PT Frequency: daily 60-90 minutes  PT goal: bed mobility: Independent, Supine to/from sit, Rolling  PT goal: transfers: Independent, Sit to/from stand, Bed to/from chair  PT goal: gait: Modified independent, Greater than 200 feet (least restrictive device)  PT goal: stairs: Greater than 10 stairs, Rail on both sides, Modified independent  (12 to basement)  PT goal 1: SBA car transfer into family vehicle  PT Goal 2: Demo safety with floor recovery SBA  PT Goal 3: IND with HEP for seated and standing BLE exercises        Patient Goal:  Pain Management: Pt is on scheduled Gabapentin and prn dilaudid, for pain control, pain is not controlled at this time   Patient/Family Goal: Bowel: Now she has senokot scheduled and as needed, LBM was on 1/8/18  Patient/Family Goal: Bladder: Pt uses the toilet for bladder needs independently, she has been continent   Goal: Skin Integrity: She has superficial excoriation of skin on buttocks, barrier cream is being used.

## 2018-01-09 NOTE — PLAN OF CARE
Problem: Goal/Outcome  Goal: Goal Outcome Summary  FOCUS/GOAL  Bowel management, Bladder management, Pain management, Wound care management, Mobility and Equipment/Assistive devices    ASSESSMENT, INTERVENTIONS AND CONTINUING PLAN FOR GOAL:    Patient is A&Ox4, continent of bowel/bladder. CGA with walker/gait belt. Had 3 BMs today per patient report. Buttock is pink, barrier cream applied by patient. C/O left shoulder pain and tingling feet at HS, ice pack applied to shoulder and feet. Pt will call when PRN Dilaudid is needed. Continue POC.

## 2018-01-10 PROCEDURE — 97116 GAIT TRAINING THERAPY: CPT | Mod: GP | Performed by: REHABILITATION PRACTITIONER

## 2018-01-10 PROCEDURE — 97535 SELF CARE MNGMENT TRAINING: CPT | Mod: GO

## 2018-01-10 PROCEDURE — 25000132 ZZH RX MED GY IP 250 OP 250 PS 637: Performed by: PHYSICIAN ASSISTANT

## 2018-01-10 PROCEDURE — 25000132 ZZH RX MED GY IP 250 OP 250 PS 637: Performed by: PHYSICAL MEDICINE & REHABILITATION

## 2018-01-10 PROCEDURE — 12800006 ZZH R&B REHAB

## 2018-01-10 PROCEDURE — 40000193 ZZH STATISTIC PT WARD VISIT: Performed by: REHABILITATION PRACTITIONER

## 2018-01-10 PROCEDURE — 97110 THERAPEUTIC EXERCISES: CPT | Mod: GP | Performed by: REHABILITATION PRACTITIONER

## 2018-01-10 PROCEDURE — 40000133 ZZH STATISTIC OT WARD VISIT

## 2018-01-10 PROCEDURE — 97530 THERAPEUTIC ACTIVITIES: CPT | Mod: GP | Performed by: REHABILITATION PRACTITIONER

## 2018-01-10 PROCEDURE — 97530 THERAPEUTIC ACTIVITIES: CPT | Mod: GO

## 2018-01-10 RX ORDER — LANOLIN ALCOHOL/MO/W.PET/CERES
3 CREAM (GRAM) TOPICAL AT BEDTIME
Status: DISCONTINUED | OUTPATIENT
Start: 2018-01-10 | End: 2018-01-16 | Stop reason: HOSPADM

## 2018-01-10 RX ADMIN — MELATONIN TAB 3 MG 3 MG: 3 TAB at 21:29

## 2018-01-10 RX ADMIN — TRAMADOL HYDROCHLORIDE 50 MG: 50 TABLET, COATED ORAL at 01:48

## 2018-01-10 RX ADMIN — ACETAMINOPHEN 650 MG: 325 TABLET, FILM COATED ORAL at 07:32

## 2018-01-10 RX ADMIN — GABAPENTIN 600 MG: 300 CAPSULE ORAL at 07:32

## 2018-01-10 RX ADMIN — ACETAMINOPHEN 650 MG: 325 TABLET, FILM COATED ORAL at 02:45

## 2018-01-10 RX ADMIN — SENNOSIDES 8.6 MG: 8.6 TABLET, FILM COATED ORAL at 07:32

## 2018-01-10 RX ADMIN — GABAPENTIN 600 MG: 300 CAPSULE ORAL at 13:21

## 2018-01-10 RX ADMIN — TRAMADOL HYDROCHLORIDE 50 MG: 50 TABLET, COATED ORAL at 13:22

## 2018-01-10 RX ADMIN — Medication 900 MG: at 21:29

## 2018-01-10 RX ADMIN — TRAMADOL HYDROCHLORIDE 50 MG: 50 TABLET, COATED ORAL at 07:33

## 2018-01-10 RX ADMIN — TRAMADOL HYDROCHLORIDE 50 MG: 50 TABLET, COATED ORAL at 19:42

## 2018-01-10 RX ADMIN — SENNOSIDES AND DOCUSATE SODIUM 2 TABLET: 8.6; 5 TABLET ORAL at 21:29

## 2018-01-10 RX ADMIN — ACETAMINOPHEN 650 MG: 325 TABLET, FILM COATED ORAL at 17:26

## 2018-01-10 RX ADMIN — ACETAMINOPHEN 650 MG: 325 TABLET, FILM COATED ORAL at 13:22

## 2018-01-10 NOTE — PLAN OF CARE
FOCUS/GOAL  Pain management    ASSESSMENT, INTERVENTIONS AND CONTINUING PLAN FOR GOAL:  Patient is alert/oriented x 4, pleasant and cooperative with cares. CGA with walker/gait belt for transfers, up to toilet, continent of bowel/bladder. Complaining of severe pain in feet and legs, gave PRN tylenol x 2 and tramadol x 1 with moderate effect. Ice is also effective in helping to manage pain.

## 2018-01-10 NOTE — PLAN OF CARE
Problem: Patient Care: Nursing Focus  Goal: Pain Management  Outcome: Improving  Focus: Physio  D: Alert and orientated times four. Ultram and tylenol for pain. Her feet are sensitive to touch. Continent of bladder. Last bm 1-10-18. Up with contact guard assist of one and walker. P: Continue current plan of care.

## 2018-01-10 NOTE — PROGRESS NOTES
"  [facility]   Acute Rehabilitation Unit  Daily progress note    interval history  Kaleigh Ziegler was seen resting in bed, reports ongoing issues with sleep at night due to pain, though feels this is overall getting better.  Would like to have some rest time during day.  Discussed increasing melatonin, patient feels this will help.  She notes using distraction with loud tv helped distract her from pain and allow for sleep though then was woke by noise in werner.  No other new concerns today.        medications    melatonin  3 mg Oral At Bedtime     sennosides  8.6 mg Oral BID     gabapentin  600 mg Oral 2 times per day     gabapentin  900 mg Oral QPM     Vitamin D3 capsule 2000 units (rice)-PATIENT'S OWN  2,000 Units Oral Daily        acetaminophen, sodium chloride, senna-docusate, polyethylene glycol, traMADol, naloxone     physical exam  /63 (BP Location: Left arm)  Pulse 97  Temp 97.5  F (36.4  C) (Oral)  Resp 16  Ht 1.734 m (5' 8.25\")  Wt 63 kg (138 lb 14.4 oz)  SpO2 93%  BMI 20.97 kg/m2  Gen: alert nad  Pulm: non labored on room air  Abd: soft non tender  Ext: warm dry without edema  Neuro/MSK: alert oriented ambulating down werner with walker and PT with stand by support.     labs  BMP reviewed.    Rehabilitation - continue comprehensive acute inpatient rehabilitation program with multidisciplinary approach including therapies, rehab nursing, and physiatry following. See interval history for updates.      assessment and plan    Mrs. 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      AIDP- presented with progressive ble pain and weakness, noted to be a-reflexic on exam.  CSF with mild lymphocytic pleocytosis, EMG consistent with AIDP.  Received 5 day course of IVIG  - follow up EMG in ~ 2 weeks  - Pain- continue " gabapentin titration- 600 mg bid, 900 mg qhs, prn tramadol, prn tylenol,  Ice.    Poor sleep- reports difficulties with pain/sleep  -requested increased melatonin try melatonin at 3 mg qhs.      Case discussed with Dr. Gomez.      Ellen Santos PA-C  Rehab Service  Pager: [phone]    I spent a total of 20 minutes face-to-face or managing the care of Kaleigh Ziegler. Over 50% of my time on the unit was spent counseling the patient and coordinating care. See note for details.

## 2018-01-10 NOTE — PLAN OF CARE
Problem: Goal/Outcome  Goal: Goal Outcome Summary  PT: pt is progressing well limited by fatigue and weakness. Pt is IND for all bed mob and SBA for sit to stand with WW. Pt amb up to 200'x 2 neding V.c for tech. Pt demo standing and supine there x program x 10. Pt ED on sitting posture with elevated feet to reduced pressure on Francesco legs.    PM  Pt demo up and down 4 steps x 2 with one rail L going up, needing heavy use of UE and rail. Pt demo supine there x and self calf stretch

## 2018-01-10 NOTE — PLAN OF CARE
Problem: Patient Care: Nursing Focus  Goal: Pain Management  Outcome: No Change  Alert and oriented X 4. Able to make needs  known. Call light in reach. C/O bilateral leg pain. Received Ultram and Tylenol along with ice packs with partial relief. Up to BR with contact guard assist and walker. Voiding without difficulty. Last BM today. Appears to be sleeping during rounds. Continue with plan of care.

## 2018-01-11 PROCEDURE — 25000132 ZZH RX MED GY IP 250 OP 250 PS 637: Performed by: PHYSICAL MEDICINE & REHABILITATION

## 2018-01-11 PROCEDURE — 97535 SELF CARE MNGMENT TRAINING: CPT | Mod: GO | Performed by: OCCUPATIONAL THERAPIST

## 2018-01-11 PROCEDURE — 12800006 ZZH R&B REHAB

## 2018-01-11 PROCEDURE — 25000132 ZZH RX MED GY IP 250 OP 250 PS 637: Performed by: PHYSICIAN ASSISTANT

## 2018-01-11 PROCEDURE — 97530 THERAPEUTIC ACTIVITIES: CPT | Mod: GP | Performed by: PHYSICAL THERAPIST

## 2018-01-11 PROCEDURE — 97112 NEUROMUSCULAR REEDUCATION: CPT | Mod: GP | Performed by: PHYSICAL THERAPIST

## 2018-01-11 PROCEDURE — 97110 THERAPEUTIC EXERCISES: CPT | Mod: GP | Performed by: PHYSICAL THERAPIST

## 2018-01-11 PROCEDURE — 97530 THERAPEUTIC ACTIVITIES: CPT | Mod: GP

## 2018-01-11 PROCEDURE — 97116 GAIT TRAINING THERAPY: CPT | Mod: GP

## 2018-01-11 PROCEDURE — 97530 THERAPEUTIC ACTIVITIES: CPT | Mod: GO | Performed by: OCCUPATIONAL THERAPIST

## 2018-01-11 PROCEDURE — 40000133 ZZH STATISTIC OT WARD VISIT: Performed by: OCCUPATIONAL THERAPIST

## 2018-01-11 PROCEDURE — 40000193 ZZH STATISTIC PT WARD VISIT: Performed by: PHYSICAL THERAPIST

## 2018-01-11 PROCEDURE — 40000193 ZZH STATISTIC PT WARD VISIT

## 2018-01-11 RX ADMIN — GABAPENTIN 600 MG: 300 CAPSULE ORAL at 08:04

## 2018-01-11 RX ADMIN — ACETAMINOPHEN 650 MG: 325 TABLET, FILM COATED ORAL at 06:27

## 2018-01-11 RX ADMIN — GABAPENTIN 600 MG: 300 CAPSULE ORAL at 14:26

## 2018-01-11 RX ADMIN — ACETAMINOPHEN 650 MG: 325 TABLET, FILM COATED ORAL at 20:13

## 2018-01-11 RX ADMIN — SENNOSIDES 8.6 MG: 8.6 TABLET, FILM COATED ORAL at 08:04

## 2018-01-11 RX ADMIN — ACETAMINOPHEN 650 MG: 325 TABLET, FILM COATED ORAL at 00:51

## 2018-01-11 RX ADMIN — MELATONIN TAB 3 MG 3 MG: 3 TAB at 21:57

## 2018-01-11 RX ADMIN — TRAMADOL HYDROCHLORIDE 50 MG: 50 TABLET, COATED ORAL at 15:55

## 2018-01-11 RX ADMIN — SENNOSIDES 8.6 MG: 8.6 TABLET, FILM COATED ORAL at 20:13

## 2018-01-11 RX ADMIN — Medication 900 MG: at 20:13

## 2018-01-11 RX ADMIN — TRAMADOL HYDROCHLORIDE 50 MG: 50 TABLET, COATED ORAL at 03:37

## 2018-01-11 NOTE — PLAN OF CARE
Problem: Goal/Outcome  Goal: Goal Outcome Summary  Outcome: Therapy, progress toward functional goals as expected  PTA: Pt trialed lying in prone in bed, able to get to this position without physical assist. Neck pillow and another pillow used for head, pt requests a pillow be placed under each shin/lower leg area for comfort. Feels she could rest in this position, potentially at night.

## 2018-01-11 NOTE — PLAN OF CARE
Problem: Goal/Outcome  Goal: Goal Outcome Summary  Outcome: Improving  Pt is alert and oriented x 4, pain is much better compared to during the night. She has a new order of Creo cuff as needed, that is ordered from [facility]. See order details re the use of this. We will continue to monitor pain and assist pt with activity of daily livings as needed.

## 2018-01-11 NOTE — PROGRESS NOTES
"  [facility]   Acute Rehabilitation Unit  Daily progress note    interval history  Kaleigh Ziegler was seen sitting up in chair, again reports 0 pain in daytime and severe pain preventing sleep at night. Notes the neuropathic pain is overall tolerable the last night or so pain that is keeping her awake is muscular in nature, aching related to exercises done with therapy that day or day prior improve with ice/streching and rest.  Encouraged her to make therapists aware of the severity of this pain, promote stretching and less intensive therapy sessions today.  Patient hesitant to add more oral agents would like to try topicals and non medication interventions as above.  No sob, fevers, n/v/, having regular bm.      PTA: Pt trialed lying in prone in bed, able to get to this position without physical assist. Neck pillow and another pillow used for head, pt requests a pillow be placed under each shin/lower leg area for comfort. Feels she could rest in this position, potentially at night.    PT: focus on gentle standing balance deonna: standing with FWw, tandem stance, finding \"challenge point\" holding 25 sec x 2 B LEs, slight unsteadiness and limited by LE fatigue.     medications    melatonin  3 mg Oral At Bedtime     sennosides  8.6 mg Oral BID     gabapentin  600 mg Oral 2 times per day     gabapentin  900 mg Oral QPM     Vitamin D3 capsule 2000 units (rice)-PATIENT'S OWN  2,000 Units Oral Daily        menthol (Topical Analgesic) 2.5%, diclofenac, acetaminophen, sodium chloride, senna-docusate, polyethylene glycol, traMADol, naloxone     physical exam  /73 (BP Location: Right arm)  Pulse 119  Temp 98.2  F (36.8  C) (Oral)  Resp 16  Ht 1.734 m (5' 8.25\")  Wt 63 kg (138 lb 14.4 oz)  SpO2 95%  BMI 20.97 kg/m2  Gen: alert nad  Pulm: non labored on room air  Abd: soft non tender  Ext: warm dry without edema   Neuro/MSK: alert oriented ambulating down werner with walker " and therapy  labs  BMP reviewed.    Rehabilitation - continue comprehensive acute inpatient rehabilitation program with multidisciplinary approach including therapies, rehab nursing, and physiatry following. See interval history for updates.      assessment and plan    Mrs. 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      AIDP- presented with progressive ble pain and weakness, noted to be a-reflexic on exam.  CSF with mild lymphocytic pleocytosis, EMG consistent with AIDP.  Received 5 day course of IVIG  - follow up EMG in ~ 2 weeks  - Pain- continue gabapentin titration- 600 mg bid, 900 mg qhs, prn tramadol, prn tylenol,  Ice.    Muscle Aches- reports initially in quads then glutes from varying PT excercises.  This muscle aching is preventing sleep.    -plan to use ice, decrease repetitions today and try more stretching  -try topical agents- per patient preference    Poor sleep- reports difficulties with pain/sleep  -continue melatonin 3 mg qhs.      Case discussed with Dr. Gomez.      Ellen Santos PA-C  Rehab Service  Pager: [phone]    I spent a total of 25 minutes face-to-face or managing the care of Kaleigh Ziegler. Over 50% of my time on the unit was spent counseling the patient and coordinating care. See note for details.

## 2018-01-11 NOTE — PLAN OF CARE
Problem: Goal/Outcome  Goal: Goal Outcome Summary  FOCUS/GOAL  Bowel management, Bladder management and Pain management    ASSESSMENT, INTERVENTIONS AND CONTINUING PLAN FOR GOAL:  AOX4, uses call light and able to make need known. CGA with walker for transfers. Continent of B&B and uses BR. CO severe pain in the calves in the evening; reports pain getting worse even with administration of gabapentin, ultram,  Tylenol,  and melatonin. Ice applied to calves at 2230. Report that pain gets worse at night leading to difficulty with sleep. Continue to monitor patient pain and treat as per POC.

## 2018-01-11 NOTE — PLAN OF CARE
Problem: Goal/Outcome  Goal: Goal Outcome Summary  FOCUS/GOAL  Bowel management, Bladder management, Medication management, Pain management, Mobility and Cognition/Memory/Judgment/Problem solving    ASSESSMENT, INTERVENTIONS AND CONTINUING PLAN FOR GOAL:  Pt is alert and oriented. Continent of bladder, voiding without difficulty using toilet. No BM, passing flatus. Takes medications whole, one at a time with water. Up with SBA with walker to bathroom. Pain an overnight issue. Pt c/o bilateral calf and buttocks pain. Pt using ice with some relief. Tylenol given x 2 and Tramadol x 1 with some relief. Pt does not sleep well at night d/t pain issues. Pt uses call light appropriately, able to make needs known. Will continue with POC.

## 2018-01-12 PROCEDURE — 25000132 ZZH RX MED GY IP 250 OP 250 PS 637: Performed by: PHYSICIAN ASSISTANT

## 2018-01-12 PROCEDURE — 40000133 ZZH STATISTIC OT WARD VISIT: Performed by: OCCUPATIONAL THERAPIST

## 2018-01-12 PROCEDURE — 97535 SELF CARE MNGMENT TRAINING: CPT | Mod: GO

## 2018-01-12 PROCEDURE — 97530 THERAPEUTIC ACTIVITIES: CPT | Mod: GP

## 2018-01-12 PROCEDURE — 97116 GAIT TRAINING THERAPY: CPT | Mod: GP

## 2018-01-12 PROCEDURE — 40000193 ZZH STATISTIC PT WARD VISIT

## 2018-01-12 PROCEDURE — 97110 THERAPEUTIC EXERCISES: CPT | Mod: GP

## 2018-01-12 PROCEDURE — 40000133 ZZH STATISTIC OT WARD VISIT

## 2018-01-12 PROCEDURE — 97535 SELF CARE MNGMENT TRAINING: CPT | Mod: GO | Performed by: OCCUPATIONAL THERAPIST

## 2018-01-12 PROCEDURE — 25000132 ZZH RX MED GY IP 250 OP 250 PS 637: Performed by: PHYSICAL MEDICINE & REHABILITATION

## 2018-01-12 PROCEDURE — 12800006 ZZH R&B REHAB

## 2018-01-12 RX ADMIN — Medication 900 MG: at 21:58

## 2018-01-12 RX ADMIN — MELATONIN TAB 3 MG 3 MG: 3 TAB at 21:58

## 2018-01-12 RX ADMIN — TRAMADOL HYDROCHLORIDE 50 MG: 50 TABLET, COATED ORAL at 05:14

## 2018-01-12 RX ADMIN — GABAPENTIN 600 MG: 300 CAPSULE ORAL at 14:19

## 2018-01-12 RX ADMIN — ACETAMINOPHEN 650 MG: 325 TABLET, FILM COATED ORAL at 02:06

## 2018-01-12 RX ADMIN — SENNOSIDES 8.6 MG: 8.6 TABLET, FILM COATED ORAL at 21:58

## 2018-01-12 RX ADMIN — SENNOSIDES 8.6 MG: 8.6 TABLET, FILM COATED ORAL at 07:56

## 2018-01-12 RX ADMIN — GABAPENTIN 600 MG: 300 CAPSULE ORAL at 07:56

## 2018-01-12 NOTE — PLAN OF CARE
Problem: Patient Care Overview  Goal: Plan of Care/Patient Progress Review  PT: progressing well with functional mobility and is now MOD I in room with WW. Working to progress away from walker and patient was able to amb short distances without device with therapist assisting for pelvic and abdominal activation. Tearful with excitement at end of session today. +20 minutes today

## 2018-01-12 NOTE — PLAN OF CARE
Problem: Individualization  Goal: Patient Individualization  Outcome: No Change  FOCUS/GOAL  Bladder management, Medication management and Pain management    ASSESSMENT, INTERVENTIONS AND CONTINUING PLAN FOR GOAL:  Pt states she slept well the first half of the night and the melatonin was effective. Pt complained of culf and gluteal pain. Was given PRN tylenol at 0200 and tramadol at 0400. Pt states that she had a conversation with the doctor to get pain patches for her gluteal pain, she did not have any patch ordered for gluteal pain. Sticky note left for MD to follow up. Pt used ice pack instead and stated it was effective. NSG to continue monitoring.

## 2018-01-12 NOTE — PLAN OF CARE
Problem: Goal/Outcome  Goal: Goal Outcome Summary  FOCUS/GOAL  Bladder management, Medication management and Pain management    ASSESSMENT, INTERVENTIONS AND CONTINUING PLAN FOR GOAL:  Pt A&Ox4, able to make needs known. Continent of bladder using toilet in BR. LBM: 1/10. C/o to bilateral calves, tolerated with prn Tramadol, Tylenol and Creo cuffs. Transfers with assist of one, gait belt and walker. Tolerating vegan diet with thin liquids, able to take pills whole. Shower done with OT this AM.

## 2018-01-12 NOTE — PLAN OF CARE
Problem: Patient Care Overview  Goal: Plan of Care/Patient Progress Review  Outcome: Therapy, progress toward functional goals as expected  OT: Pt completing ADL routine Mod IND using fww. Reinforced energy conservation and fatigue management strategies. Pt OK for Mod IND with fww during day from OT standpt, PT to further assess. Anticipate pt will require increased assistance overnight due to increased LE pain.

## 2018-01-12 NOTE — PROGRESS NOTES
"  [facility]   Acute Rehabilitation Unit  Daily progress note    interval history  Kaleigh Ziegler seen sitting up in bed, reports she had better night sleep and feels we are heading in the right direction overall.  Happy with progress with mobility with therapy and glad she took on more stretching and lighter activity yesterday as muscle aching improved.  Having regular bowel and bladder function, eating ok, no sob, or other concerns.    MOD I in room today,   OT: Pt completing ADL routine Mod IND using fww. Reinforced energy conservation and fatigue management strategies. Pt OK for Mod IND with fww during day from OT standpt, PT to further assess. Anticipate pt will require increased assistance overnight due to increased LE pain.     medications    melatonin  3 mg Oral At Bedtime     sennosides  8.6 mg Oral BID     gabapentin  600 mg Oral 2 times per day     gabapentin  900 mg Oral QPM     Vitamin D3 capsule 2000 units (rice)-PATIENT'S OWN  2,000 Units Oral Daily        menthol (Topical Analgesic) 2.5%, diclofenac, acetaminophen, sodium chloride, senna-docusate, polyethylene glycol, traMADol, naloxone     physical exam  /72 (BP Location: Left arm)  Pulse 88  Temp 97.5  F (36.4  C) (Oral)  Resp 16  Ht 1.734 m (5' 8.25\")  Wt 63 kg (138 lb 14.4 oz)  SpO2 98%  BMI 20.97 kg/m2  Gen: alert nad  Pulm: non labored on room air  Abd: soft non tender  Ext: warm dry without edema   Neuro/MSK: alert oriented upper extremities 5/5, ble df 3/5, pf 3/5, hips/knees 4+/5, ongoing ble calf to foot impaired sensation    labs  No new labs.    Rehabilitation - continue comprehensive acute inpatient rehabilitation program with multidisciplinary approach including therapies, rehab nursing, and physiatry following. See interval history for updates.      assessment and plan    Mrs. 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      AIDP- presented with progressive ble pain and weakness, noted to be a-reflexic on exam.  CSF with mild lymphocytic pleocytosis, EMG consistent with AIDP.  Received 5 day course of IVIG  - follow up EMG in ~ 2 weeks  - Pain- continue gabapentin titration- 600 mg bid, 900 mg qhs, prn tramadol, prn tylenol,  Ice.    Muscle Aches- reports initially in quads then glutes from varying PT excercises.  This muscle aching is preventing sleep.    -plan to use ice, decrease repetitions today and try more stretching  -try topical agents- both icy hot and diclofenac available prn.    Poor sleep- (improving) reports difficulties with pain/sleep  -continue melatonin 3 mg qhs.      Case discussed with Dr. Gomez.      Ellen Santos PA-C  Rehab Service  Pager: [phone]    I spent a total of 25 minutes face-to-face or managing the care of Kaleigh Ziegler. Over 50% of my time on the unit was spent counseling the patient and coordinating care. See note for details.

## 2018-01-12 NOTE — PLAN OF CARE
Problem: Goal/Outcome  Goal: Goal Outcome Summary  Outcome: No Change  FOCUS/GOAL  Nutrition/Feeding/Swallowing precautions and Pain management    ASSESSMENT, INTERVENTIONS AND CONTINUING PLAN FOR GOAL:  Pt a&ox4. Became Mod I in room today using a walker. Pt is only Mod I when awake once its bedtime she is to call for SBA for safety. Pt wanted to discontinue the CyroCuff today and is utilizing ice packs for calf and glute pain. Pt also has PRN creams available to help with pain, however only utilized ice this shift. No other concerns at this time. Continue with poc.

## 2018-01-13 PROCEDURE — 40000133 ZZH STATISTIC OT WARD VISIT

## 2018-01-13 PROCEDURE — 97112 NEUROMUSCULAR REEDUCATION: CPT | Mod: GP | Performed by: PHYSICAL THERAPIST

## 2018-01-13 PROCEDURE — 97530 THERAPEUTIC ACTIVITIES: CPT | Mod: GP | Performed by: PHYSICAL THERAPIST

## 2018-01-13 PROCEDURE — 40000193 ZZH STATISTIC PT WARD VISIT: Performed by: PHYSICAL THERAPIST

## 2018-01-13 PROCEDURE — 97530 THERAPEUTIC ACTIVITIES: CPT | Mod: GO

## 2018-01-13 PROCEDURE — 12800006 ZZH R&B REHAB

## 2018-01-13 PROCEDURE — 97110 THERAPEUTIC EXERCISES: CPT | Mod: GP | Performed by: PHYSICAL THERAPIST

## 2018-01-13 PROCEDURE — 25000132 ZZH RX MED GY IP 250 OP 250 PS 637: Performed by: PHYSICIAN ASSISTANT

## 2018-01-13 PROCEDURE — 25000132 ZZH RX MED GY IP 250 OP 250 PS 637: Performed by: PHYSICAL MEDICINE & REHABILITATION

## 2018-01-13 PROCEDURE — 97535 SELF CARE MNGMENT TRAINING: CPT | Mod: GO

## 2018-01-13 RX ORDER — ACETAMINOPHEN 500 MG
1000 TABLET ORAL 4 TIMES DAILY PRN
Status: DISCONTINUED | OUTPATIENT
Start: 2018-01-13 | End: 2018-01-16 | Stop reason: HOSPADM

## 2018-01-13 RX ADMIN — ACETAMINOPHEN 650 MG: 325 TABLET, FILM COATED ORAL at 05:24

## 2018-01-13 RX ADMIN — TRAMADOL HYDROCHLORIDE 50 MG: 50 TABLET, COATED ORAL at 01:08

## 2018-01-13 RX ADMIN — GABAPENTIN 600 MG: 300 CAPSULE ORAL at 08:53

## 2018-01-13 RX ADMIN — SENNOSIDES 8.6 MG: 8.6 TABLET, FILM COATED ORAL at 21:01

## 2018-01-13 RX ADMIN — MELATONIN TAB 3 MG 3 MG: 3 TAB at 21:01

## 2018-01-13 RX ADMIN — Medication 900 MG: at 21:01

## 2018-01-13 RX ADMIN — DICLOFENAC SODIUM 2 G: 10 GEL TOPICAL at 02:29

## 2018-01-13 RX ADMIN — GABAPENTIN 600 MG: 300 CAPSULE ORAL at 14:24

## 2018-01-13 RX ADMIN — SENNOSIDES 8.6 MG: 8.6 TABLET, FILM COATED ORAL at 08:53

## 2018-01-13 NOTE — PLAN OF CARE
Problem: Patient Care: Nursing Focus  Goal: Pain Management  FOCUS/GOAL  Pain management    ASSESSMENT, INTERVENTIONS AND CONTINUING PLAN FOR GOAL:  Denies pain this shift. Explains that usually starts at noc. MOD I in room. Took scheduled med, no c/o's.

## 2018-01-13 NOTE — PLAN OF CARE
Problem: Goal/Outcome  Goal: Goal Outcome Summary  FOCUS/GOAL  Bowel management, Bladder management, Pain management, Mobility and Cognition/Memory/Judgment/Problem solving    ASSESSMENT, INTERVENTIONS AND CONTINUING PLAN FOR GOAL:  Pt is alert and oriented. Continent of bladder, voiding in good amounts on the toilet. No BM this shift. Pt taking Tramadol and Tylenol with minimal relief. Voltaren gel used x 1 this shift with good relief, although temporary, reported. Pt expressed concerns about discharging while overnight pain is still unmanageable. Up with SBA with walker overnight. Pt slept intermittently between cares. Uses call light appropriately, able to make needs known. Will continue with POC.

## 2018-01-13 NOTE — PLAN OF CARE
"Problem: Patient Care Overview  Goal: Plan of Care/Patient Progress Review  FOCUS/GOAL  Pain management and Medical management    ASSESSMENT, INTERVENTIONS AND CONTINUING PLAN FOR GOAL:  Patient is alert/oriented x4, has been safely Mod I in room on this shift with walker.  Patient does report some pain ongoing but denied intervention on this shift, had friends visiting and some massage therapy.  Patient is able to direct cares appropriately, did request ice packs for lower legs this evening and states \"I will call if I need Tylenol or the Icy Hot patch.\"  Patient also directed to use call light during the NOC to transfer safely and verbalized awareness of this.  No additional care concerns at this time, continue with POC.        "

## 2018-01-14 PROCEDURE — 25000132 ZZH RX MED GY IP 250 OP 250 PS 637: Performed by: PHYSICAL MEDICINE & REHABILITATION

## 2018-01-14 PROCEDURE — 97110 THERAPEUTIC EXERCISES: CPT | Mod: GP | Performed by: PHYSICAL THERAPIST

## 2018-01-14 PROCEDURE — 25000132 ZZH RX MED GY IP 250 OP 250 PS 637: Performed by: PHYSICIAN ASSISTANT

## 2018-01-14 PROCEDURE — 97530 THERAPEUTIC ACTIVITIES: CPT | Mod: GP | Performed by: PHYSICAL THERAPIST

## 2018-01-14 PROCEDURE — 97112 NEUROMUSCULAR REEDUCATION: CPT | Mod: GP | Performed by: PHYSICAL THERAPIST

## 2018-01-14 PROCEDURE — 97535 SELF CARE MNGMENT TRAINING: CPT | Mod: GO

## 2018-01-14 PROCEDURE — 12800006 ZZH R&B REHAB

## 2018-01-14 PROCEDURE — 40000133 ZZH STATISTIC OT WARD VISIT

## 2018-01-14 PROCEDURE — 40000193 ZZH STATISTIC PT WARD VISIT: Performed by: PHYSICAL THERAPIST

## 2018-01-14 RX ORDER — CYCLOBENZAPRINE HCL 5 MG
10 TABLET ORAL 3 TIMES DAILY PRN
Status: DISCONTINUED | OUTPATIENT
Start: 2018-01-14 | End: 2018-01-16 | Stop reason: HOSPADM

## 2018-01-14 RX ADMIN — MELATONIN TAB 3 MG 3 MG: 3 TAB at 21:52

## 2018-01-14 RX ADMIN — SENNOSIDES 8.6 MG: 8.6 TABLET, FILM COATED ORAL at 21:52

## 2018-01-14 RX ADMIN — ACETAMINOPHEN 1000 MG: 500 TABLET ORAL at 08:01

## 2018-01-14 RX ADMIN — ACETAMINOPHEN 1000 MG: 500 TABLET ORAL at 00:13

## 2018-01-14 RX ADMIN — GABAPENTIN 600 MG: 300 CAPSULE ORAL at 08:01

## 2018-01-14 RX ADMIN — ACETAMINOPHEN 1000 MG: 500 TABLET ORAL at 17:59

## 2018-01-14 RX ADMIN — Medication 900 MG: at 21:51

## 2018-01-14 RX ADMIN — CYCLOBENZAPRINE HYDROCHLORIDE 10 MG: 5 TABLET, FILM COATED ORAL at 22:12

## 2018-01-14 RX ADMIN — SENNOSIDES 8.6 MG: 8.6 TABLET, FILM COATED ORAL at 08:01

## 2018-01-14 RX ADMIN — Medication 900 MG: at 14:06

## 2018-01-14 RX ADMIN — TRAMADOL HYDROCHLORIDE 50 MG: 50 TABLET, COATED ORAL at 04:57

## 2018-01-14 RX ADMIN — MENTHOL 2 PATCH: 205.5 PATCH TOPICAL at 22:39

## 2018-01-14 RX ADMIN — CYCLOBENZAPRINE HYDROCHLORIDE 10 MG: 5 TABLET, FILM COATED ORAL at 08:35

## 2018-01-14 NOTE — PLAN OF CARE
Problem: Goal/Outcome  Goal: Goal Outcome Summary  FOCUS/GOAL  Bowel management, Bladder management, Pain management, Mobility, Cognition/Memory/Judgment/Problem solving and Equipment/Assistive devices    ASSESSMENT, INTERVENTIONS AND CONTINUING PLAN FOR GOAL:  Pt is alert and oriented. Continent of bladder, voiding spontaneously using toilet. Pt had BM this shift. Pain an ongoing issue at night. Tylenol and Tramadol given x 1, ice packs given x 2 and heat packs x 1 with minimal relief. Pt slept intermittently during the night. Pt up Mod I with walker, calls for assistance during the night. Uses call light appropriately, able to make needs known. Will continue with POC.

## 2018-01-14 NOTE — PROGRESS NOTES
PM&R Daily Note:    Kaleigh had considerable increased pains last night. More distressed I saw her earlier this morning to talk through options. We'll increase gabapentin to 900 mg 3 times daily. Also add as needed cyclobenzaprine 10 mg t.i.d. p.r.n. muscles soreness. She is not use the menthol patches and encouraged her to try them. She has acetaminophen bit higher dose now 1000 mg q.i.d. p.r.n. She may take tramadol as needed for breakthrough. She also has topical diclofenac gel. Stretching heat and ice should all be continued. She flatly denies feeling anxious though outwardly she appears so.    Care conference on Monday and may be ready to discharge Monday though with her increased pains, I'm sensing she may need till Tuesday.    Vitals:    01/12/18 1539 01/13/18 0842 01/13/18 1610 01/14/18 0752   BP: 117/72 128/72 100/60 135/87   BP Location: Left arm Left arm Left arm Left arm   Pulse: 88 85 83 92   Resp: 16 16 16 16   Temp: 97.5  F (36.4  C) 98.8  F (37.1  C) 98.5  F (36.9  C) 97.8  F (36.6  C)   TempSrc: Oral Oral Oral Oral   SpO2: 98% 96% 96% 97%   Weight:       Height:         Alert pleasant fluent speech, more anxious today especially in the morning. I saw her later in the day, feeling better and walking and halls with walker.  Breathing easy without cough or wheeze  No tenderness to palpation through bilateral hamstrings.  Definite tenderness and pain in the hamstring area with straight leg raise but this does not seem to radiate or be radicular in nature.  No lower extremity edema        gabapentin  900 mg Oral TID     menthol   Transdermal Q8H     melatonin  3 mg Oral At Bedtime     sennosides  8.6 mg Oral BID     Vitamin D3 capsule 2000 units (rice)-PATIENT'S OWN  2,000 Units Oral Daily     cyclobenzaprine, acetaminophen, menthol **AND** menthol **AND** menthol, diclofenac, sodium chloride, senna-docusate, polyethylene glycol, traMADol, naloxone     25 minutes spent today, 15 in direct patient  interaction, remainder in coordination of care

## 2018-01-14 NOTE — PLAN OF CARE
Problem: Patient Care: Nursing Focus  Goal: Pain Management  FOCUS/GOAL  Pain management    ASSESSMENT, INTERVENTIONS AND CONTINUING PLAN FOR GOAL:  Complaining of pain early in shift wanting to see PMR. Seen by MD et given tylenol et flexeril et effective. Able to participate with therapies. Taking food et fluids well.

## 2018-01-14 NOTE — PLAN OF CARE
Problem: Patient Care Overview  Goal: Plan of Care/Patient Progress Review  OT: [place] day shower and ADL routine completed today. Pt able to wash/dry 10/10 body parts mod independently while seated on shower chair throughout. Pt is mod independent with toileting, g/h, and FB dressing as well. Pt reported having shower chair at home and grab bars being installed in shower prior to discharge on 1/16.

## 2018-01-14 NOTE — PLAN OF CARE
Problem: Goal/Outcome  Goal: Goal Outcome Summary  Outcome: Therapy, progress toward functional goals as expected  Patient apprehensive about tentative discharge on Tuesday, states she is unsure how she will get up/down her 3 steps getting into her home. PT discussed getting a portable ramp to place over steps; patient able to walk up/down ramp in therapy gym with FWW and CGA.

## 2018-01-14 NOTE — PLAN OF CARE
FOCUS/GOAL  Pain management, Discharge planning and Mobility    ASSESSMENT, INTERVENTIONS AND CONTINUING PLAN FOR GOAL:  Functionally improving pt is MOD I in her room using walker and was able to do the task safely   Denies pain on her legs she said it only bothers her at night , and due to that she is only getting three hours of sleep Melatonin 3 mg was given at HS   Visited by her friends this evening and she was given a light massage on both legs and also pt massaged her leg too pt was sleeping at 2100 staff has to wake her up for her hs medications   Pt said the massage helped her to sleep   Care conference on Monday and will be d/c to home on Tues   Continue to monitor pain , provide comfort and continue to promote independence .

## 2018-01-14 NOTE — PROGRESS NOTES
PM&R Daily Note:    Kaleigh continues to show slow steady improvements with her strength and function.  Walking more consistently with 2 wheeled walker.  She still has some worries and anxieties about discharging home.  Working on pain management and there seems to be components both neuropathic as well as muscle soreness and tightness.  She does continue with gabapentin which we will continue dosing as is.  Will increase her acetaminophen up to 1000 mg 4 times daily as needed.  She has tramadol as needed second line.  Other modalities encouraged she find some light massage which her  can do or others with her friends/family per her preference.  Care conference on Monday and may be ready to discharge Monday or Tuesday.    Vitals:    01/12/18 0902 01/12/18 1539 01/13/18 0842 01/13/18 1610   BP: 108/78 117/72 128/72 100/60   BP Location: Left arm Left arm Left arm Left arm   Pulse: 113 88 85 83   Resp:  16 16 16   Temp: 97.5  F (36.4  C) 97.5  F (36.4  C) 98.8  F (37.1  C) 98.5  F (36.9  C)   TempSrc: Oral Oral Oral Oral   SpO2: 96% 98% 96% 96%   Weight:       Height:         Alert pleasant fluent speech  Bit verbose and seems to need to share many of the details around her planning for home discharge.  Some of this is repeated information from days prior though I am not concerned about with cognition.  Breathing easy without cough or wheeze  Observed also ambulating with 2 wheeled walker with a stable reciprocal gait pattern.  Stride length is bit shorter.  Able to get up from typical surfaces though does use her arms to push up and support.      menthol   Transdermal Q8H     melatonin  3 mg Oral At Bedtime     sennosides  8.6 mg Oral BID     gabapentin  600 mg Oral 2 times per day     gabapentin  900 mg Oral QPM     Vitamin D3 capsule 2000 units (rice)-PATIENT'S OWN  2,000 Units Oral Daily     acetaminophen, menthol **AND** menthol **AND** menthol, diclofenac, sodium chloride, senna-docusate, polyethylene  glycol, traMADol, naloxone

## 2018-01-15 PROCEDURE — 40000133 ZZH STATISTIC OT WARD VISIT

## 2018-01-15 PROCEDURE — 25000132 ZZH RX MED GY IP 250 OP 250 PS 637: Performed by: PHYSICIAN ASSISTANT

## 2018-01-15 PROCEDURE — 40000187 ZZH STATISTIC PATIENT MED CONFERENCE < 30 MIN

## 2018-01-15 PROCEDURE — 97530 THERAPEUTIC ACTIVITIES: CPT | Mod: GP

## 2018-01-15 PROCEDURE — 40000193 ZZH STATISTIC PT WARD VISIT

## 2018-01-15 PROCEDURE — 25000132 ZZH RX MED GY IP 250 OP 250 PS 637: Performed by: PHYSICAL MEDICINE & REHABILITATION

## 2018-01-15 PROCEDURE — 97535 SELF CARE MNGMENT TRAINING: CPT | Mod: GO

## 2018-01-15 PROCEDURE — 97116 GAIT TRAINING THERAPY: CPT | Mod: GP

## 2018-01-15 PROCEDURE — 40000183 ZZH STATISTIC PT MED CONFERENCE < 30 MIN

## 2018-01-15 PROCEDURE — 12800006 ZZH R&B REHAB

## 2018-01-15 RX ADMIN — SENNOSIDES AND DOCUSATE SODIUM 2 TABLET: 8.6; 5 TABLET ORAL at 21:15

## 2018-01-15 RX ADMIN — ACETAMINOPHEN 1000 MG: 500 TABLET ORAL at 01:04

## 2018-01-15 RX ADMIN — Medication 900 MG: at 21:13

## 2018-01-15 RX ADMIN — DICLOFENAC SODIUM 2 G: 10 GEL TOPICAL at 06:31

## 2018-01-15 RX ADMIN — MELATONIN TAB 3 MG 3 MG: 3 TAB at 21:13

## 2018-01-15 RX ADMIN — SENNOSIDES 8.6 MG: 8.6 TABLET, FILM COATED ORAL at 08:11

## 2018-01-15 RX ADMIN — MENTHOL 2 PATCH: 205.5 PATCH TOPICAL at 22:20

## 2018-01-15 RX ADMIN — Medication 900 MG: at 08:11

## 2018-01-15 RX ADMIN — CYCLOBENZAPRINE HYDROCHLORIDE 10 MG: 5 TABLET, FILM COATED ORAL at 21:12

## 2018-01-15 RX ADMIN — CYCLOBENZAPRINE HYDROCHLORIDE 10 MG: 5 TABLET, FILM COATED ORAL at 08:11

## 2018-01-15 RX ADMIN — TRAMADOL HYDROCHLORIDE 50 MG: 50 TABLET, COATED ORAL at 03:31

## 2018-01-15 RX ADMIN — Medication 900 MG: at 14:58

## 2018-01-15 NOTE — CARE CONFERENCE
Acute Rehab Care Conference/Team Rounds      Type: Patient Conference    Present: Dr Channing Gomez, Aletha Cedillo [facility], Kaleigh Ziegler patient, Africa Iglesias OT, Stella Dalton PT, Jennie Benavides RN.      Discharge Barriers/Treatment/Education    Rehab Diagnosis: AIDP    Active Medical Co-morbidities/Prognosis: neuropathic and myofascial pain.    Safety: No alarms. Calls for assistance at noc. Uses call light appropriately.     Pain: Primarily at night. Using Tylenol, Tramadol, ice and heat. Icy Hot patches on overnight 1/14 seems to help.     Medications, Skin, Tubes/Lines: Takes medications whole with water. Skin intact. No tubes or lines.     Swallowing/Nutrition:    Bowel/Bladder: Continent of bladder, voiding spontaneously in toilet. Continent of bowel, last BM 1/14. Miralax and Senna available.     Psychosocial: Pt resides with her . Goal to return home with continued support from family. Team working towards a safe d/c plan.     ADLs/IADLs: Patient progressing well towards OT goals. Patient is able to complete room mobility, bed mobility and toilet transfer mod I with use of FWW. Patient is able to complete FB dressing and bathing with modified independence, increased time and use of adaptive equipment. Patient has increased activity tolerance to participate in light meal prep tasks or other IADLs at home with use of FWW and self management of energy conservation.   Patient plans to install grab bars in bathroom and will have shower chair for safety with tub transfer and bathing. Patient will benefit from ongoing family assist initially at home as needed.     Mobility: Patient is now MOD I in room with WW. She is making positive gains in gait stability, transfer safety, and stair negotiation. Questionable safety for outside stairs to enter, patient is resistant to installing rail at home despite significant safety risk on outdoor stairs. Planning for HCPT initially for safety assessment. Will need tall WW  and outside railing before d/c.     Cognition/Language: WFL     Community Re-Entry:    Transportation: Would recommend assist with driving due to B LE weakness and decreased activity tolerance.     Decision maker: self    Plan of Care and goals reviewed and updated.    Discharge Plan/Recommendations    Fall Precautions: continue    Patient/Family input to goals: working to get hand railing on entry way.     Overall plan for the patient: Has made definite functional progress and able to discharge home tomorrow. Walking with 2WW. Stairs a challenge and will have assistance if not a railing to navigate these. Some further work in ambulating with can or no AD can be outpatient goals. Plan an initial in home safety assessment with transition to outpatient PT.      Utilization Review and Continued Stay Justification    Medical Necessity Criteria:    For any criteria that is not met, please document reason and plan for discharge, transfer, or modification of plan of care to address.    Requires intensive rehabilitation program to treat functional deficits?: Yes    Requires 3x per week or greater involvement of rehabilitation physician to oversee rehabilitation program?: Yes    Requires rehabilitation nursing interventions?: Yes    Patient is making functional progress?: Yes    There is a potential for additional functional progress? Yes    Patient is participating in therapy 3 hours per day a minimum of 5 days per week or 15 hours per week in 7 day period?:Yes    Has discharge needs that require coordinated discharge planning approach?:Yes      Final Physician Sign off    Statement of Approval: I agree with all the recommendations detailed in this document.     Patient Goals             OT target date for goal attainment: 01/16/18  OT Frequency: daily,  minutes         OT goal: lower body dressing: Modified independent, including set-up/clothing retrieval  OT goal: upper body bathing: Modified independent, using  adaptive equipment  OT goal: lower body bathing: Modified independent, using adaptive equipment  OT goal: bed mobility: Independent  OT Goal: transfer: Modified independent, with assistive device  OT goal: toilet transfer/toileting: Modified independent, using adaptive equipment  OT goal: meal preparation: Modified independent, with simple meal preparation  OT goal: cognitive: Patient/caregiver will verbalize understanding of cognitive assessment results/recommendations as needed for safe discharge planning  OT goal: perform aerobic activity with stable cardiovascular response: continuous activity, 20 minutes  OT goal 1: Pateint will be able to demonstrate walk in shower transfer with supervision with use of grab bar and shower chair      PT target date for goal attainment: 01/16/18  PT Frequency: daily 60-90 minutes  PT goal: bed mobility: Independent, Supine to/from sit, Rolling  PT goal: transfers: Independent, Sit to/from stand, Bed to/from chair  PT goal: gait: Modified independent, Greater than 200 feet (least restrictive device)  PT goal: stairs:  (2 w platform home to enter home without rail)  PT goal 1: SBA car transfer into family vehicle  PT Goal 2: Demo safety with floor recovery SBA  PT Goal 3: IND with HEP for seated and standing BLE exercises     Nursing Goal: Pain Management: Pt will continue to advocate for pain medications, especially before therapies and sleep, to manage pain prior to discharge.,  Nursing Goal: Bowel: Pt will verbalize 3 interventions for maintaining regular bowel movements and will advocate for PRN medications as needed prior to discharge.   Nursing Goal: Bladder: Pt uses the toilet for bladder needs independently, she has been continent  Nursing Goal: Skin Integrity: Pt will verbalize 3 methods for preventing pressure ulcers and will participate in skin cares prior to discharge.

## 2018-01-15 NOTE — PROGRESS NOTES
"   01/15/18 1500   Values Beliefs and Spiritual Care   C: Community: In support of your spiritual health, is there someone we may contact for you? (identify all that apply) (Michael-O/ARU)   Visit Information   Visit Made By Staff    Type of Visit Initial;Staff consultation/triage   Visited Patient   Interventions   Plan of Care Review   With patient/family/proxy   Basic Spiritual Interventions    introduction/orientation to Spiritual Health Services;Assessment of spiritual needs/resources;Reflective conversation;Life Review   Advanced Assessments/Interventions   Presenting Concerns/Issues Spiritual/Uatsdin/emotional support;Grief and adjustment issues;Self-concept disturbance   SPIRITUAL HEALTH SERVICES  SPIRITUAL  ASSESSMENT Progress Note  [facility] ([facility] 5R    PRIMARY FOCUS    Goals of Care    Symptom/pain management    Support for coping    ILLNESS CIRCUMSTANCES:   Reviewed documentation. Responded to referral based on staff suggestion.  Reflective conversation shared with Kaleigh which integrated elements of illness and family narratives.    Context of Serious Illness/Sympton(s)- Kaleigh shared that she has no children by choice in order to support the earth. She said that she has three degrees including a Phd in critical thinking, and a degree in botany, and a master's in education. 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 \"I love to paddle board, and kayak and canoe. My favorite thing is to get some food, kayak to the cattails, eat and fall asleep.\" At home she has a cat, Carson, who belongs to her  really, and of whom she is not fond.    Resources for Support - Her , Saul, and her \"posse\".    DISTRESS:     Emotional, Spiritual, Existential Distress - Kaleigh shared that at another challenging time of her life she was introduced to: Rational Emotive " "Therapy, which she has found very helpful.  It includes radical acceptance, and letting go of \"woulda, coulda, shoulda\".     Anabaptism Distress - n/a    Social/Cultural/Economic- Kaleigh said that she is on short-term disability from work.    SPIRITUAL/Yazidi COPING):     Advent/Annabella - n/d    Spiritual Practice(s) - n/d    Emotional, Relational,Existential Connections- Kaleigh named that normally she is vegan, and she is involved in many activities that revolve around protecting the earth, and its vulnerable creatures and beings.  She also said that she has a \"posse\" of people who care about her well-being.     GOALS OF CARE:    Goals of Care - To take it day by day and get better day by day.    Meaning/Sense-Making- Kaleigh said that our adrenals are burning out b/c of all that is going on in this [place]; \"that's why I got sick\".    PLAN: Kaleigh will d/c tomorrow. No further visits planned.    Rev. Michael-O Jd-Gisela  Staff   [phone]  * [facility] remains available 24/7 for emergent requests/referrals, either by having the switchboard page the on-call  or by entering an ASAP/STAT consult in Epic (this will also page the on-call ).*        "

## 2018-01-15 NOTE — PLAN OF CARE
Problem: Goal/Outcome  Goal: Goal Outcome Summary  Outcome: Improving  FOCUS/GOAL  Pain management, Medical management, Equipment/Assistive devices and Reinforcement of self-care/ADL    ASSESSMENT, INTERVENTIONS AND CONTINUING PLAN FOR GOAL:  Patient is alert and oriented x 4 and is able to make needs known by using call light and verbalizing needs.  Patients lung sounds are clear bilaterally, bowel sounds + x 4 quadrants.  Patient is continent of bowel and bladder and is Mod I in room with walker and toileting self.  Skin to bilateral buttocks intact and observed to have no signs of irritation.  Wound to right calf has small dry intact scab covering.  Patient denies pain to either area.  Patient did have c/o pain.  Tingling nerve pain to bilateral feet and calves which was resolved and helped by removing socks, propping on pillows, applying ice packs, and taking Tylenol PRN.  Pain to bilateral gluteus danielle muscles being treated with menthol patchs which were applied at 22:39.  Results pending on effectiveness.  Patient observed to be ambulating safely in room with walker and putting on gripper socks prior to standing up.  Patient was able to independently get to toilet, void, give saúl cares, wash hands, then was able to use water pick and brush teeth by sitting at sink.  PRN Flexeril was given at 22:12, results pending.  Patient had visitor who brought in food for dinner and she ate 100%.  Is drinking adequate fluids of water and tea at bedside.  Patient states she is comfortable with pain 3/10 at this time and is comfortable in bed.

## 2018-01-15 NOTE — PLAN OF CARE
Problem: Patient Care Overview  Goal: Plan of Care/Patient Progress Review  Outcome: No Change  FOCUS/GOAL  Bowel management, Bladder management, and Medical management    ASSESSMENT, INTERVENTIONS AND CONTINUING PLAN FOR GOAL:  Pt continent of bowel and bladder. LBM 1/14 per pt report. Pt denied pain this morning but did request PRN flexeril. Given x1 with no other complaints of discomfort throughout the day. Pt had care conference this afternoon, see CC note for more information. She is set to discharge tomorrow around 1430. Continue with POC.

## 2018-01-15 NOTE — DISCHARGE INSTRUCTIONS
Follow up appointments:     -- Dr. Heaven Rose M.D. Within a few weeks.  Please call [phone] to schedule your appointment with Dr. Rose.    Address  [facility] [address]; [address]                          [place] [address]  Phone   [phone]  Fax                  [phone]    -- Neurology Dr. Sachin Allen MD at [facility]Y3 in 1-2 months (outlined by Dr. Allen from inpatient stay to see him)  You are scheduled to see Dr. Allen on February 21st at 12:50 pm.      HOME CARE  [facility] [phone] will provide PT. They will call you after you discharge to schedule the first visit.

## 2018-01-15 NOTE — PLAN OF CARE
Problem: Goal/Outcome  Goal: Goal Outcome Summary  FOCUS/GOAL  Bowel management, Bladder management, Pain management, Mobility and Cognition/Memory/Judgment/Problem solving    ASSESSMENT, INTERVENTIONS AND CONTINUING PLAN FOR GOAL:  Pt is alert and oriented. Continent of bladder, voiding without difficulty. No BM this shift, passing flatus. Pain management: pt using Icy Hot patches to bilateral gluteus danielle and reports improved pain control. Tylenol and Tramadol given x 1 with slight improvement noted. Pt continues to rate pain at 3/10 and states that it is difficult to sleep with it that high. Pt up Mod I, but calls for assistance during the night. Uses call light appropriately, able to make needs known. Will continue with POC.

## 2018-01-15 NOTE — PROGRESS NOTES
PM&R Daily Note:    Formal care conference held today. please see separate document in Plan of Care tab for full details. Briefly Has made definite functional progress and able to discharge home tomorrow. Walking with 2WW. Stairs a challenge and will have assistance if not a railing to navigate these. Some further work in ambulating with can or no AD can be outpatient goals. Plan an initial in home safety assessment with transition to outpatient PT.    Pains: Reviewed again all of her different medications.  Last night was definitely better than the night before.  After she gets another day trial with different medicines we will finalize what she will discharge home with.    gabapentin to 900 mg 3 times daily  cyclobenzaprine 10 mg t.i.d. p.r.n. muscles soreness.   menthol patches   acetaminophen bit higher dose now 1000 mg q.i.d. P.r.n.  tramadol as needed for breakthrough  topical diclofenac gel.   Stretching heat and ice should all be continued      Vitals:    01/14/18 0752 01/14/18 1548 01/15/18 0827 01/15/18 1604   BP: 135/87 108/61 126/70 112/66   BP Location: Left arm Left arm Left arm Left arm   Pulse: 92 100 94 86   Resp: 16 16 16 16   Temp: 97.8  F (36.6  C) 98.8  F (37.1  C) 97.2  F (36.2  C) 98.7  F (37.1  C)   TempSrc: Oral Oral Oral Oral   SpO2: 97% 95% 98% 97%   Weight:   63.4 kg (139 lb 12.8 oz)    Height:         Alert pleasant fluent speech, less anxious today than yesterday  A bleeding with a stable reciprocal gait pattern, no loss of balance but some shorter stride length.        gabapentin  900 mg Oral TID     menthol   Transdermal Q8H     melatonin  3 mg Oral At Bedtime     sennosides  8.6 mg Oral BID     Vitamin D3 capsule 2000 units (rice)-PATIENT'S OWN  2,000 Units Oral Daily     cyclobenzaprine, acetaminophen, menthol **AND** menthol **AND** menthol, diclofenac, sodium chloride, senna-docusate, polyethylene glycol, traMADol, naloxone     I spent 75 minutes today, 55 in direct patient  interaction including care conference as well as some time at bedside this morning, remainder in coordination of care.

## 2018-01-16 VITALS
HEART RATE: 114 BPM | TEMPERATURE: 98 F | WEIGHT: 139.8 LBS | BODY MASS INDEX: 21.19 KG/M2 | SYSTOLIC BLOOD PRESSURE: 117 MMHG | OXYGEN SATURATION: 98 % | DIASTOLIC BLOOD PRESSURE: 64 MMHG | HEIGHT: 68 IN | RESPIRATION RATE: 18 BRPM

## 2018-01-16 PROCEDURE — 40000193 ZZH STATISTIC PT WARD VISIT

## 2018-01-16 PROCEDURE — 97530 THERAPEUTIC ACTIVITIES: CPT | Mod: GP

## 2018-01-16 PROCEDURE — 97110 THERAPEUTIC EXERCISES: CPT | Mod: GO

## 2018-01-16 PROCEDURE — 40000133 ZZH STATISTIC OT WARD VISIT

## 2018-01-16 PROCEDURE — 25000132 ZZH RX MED GY IP 250 OP 250 PS 637: Performed by: PHYSICAL MEDICINE & REHABILITATION

## 2018-01-16 PROCEDURE — 25000132 ZZH RX MED GY IP 250 OP 250 PS 637: Performed by: PHYSICIAN ASSISTANT

## 2018-01-16 PROCEDURE — 97535 SELF CARE MNGMENT TRAINING: CPT | Mod: GO

## 2018-01-16 RX ORDER — ACETAMINOPHEN 500 MG
1000 TABLET ORAL 4 TIMES DAILY PRN
Qty: 60 TABLET | Refills: 0 | Status: SHIPPED | OUTPATIENT
Start: 2018-01-16 | End: 2018-06-06

## 2018-01-16 RX ORDER — SENNOSIDES 8.6 MG
1-2 TABLET ORAL 2 TIMES DAILY PRN
Qty: 60 TABLET | Refills: 0 | Status: SHIPPED | OUTPATIENT
Start: 2018-01-16 | End: 2018-06-06

## 2018-01-16 RX ORDER — TRAMADOL HYDROCHLORIDE 50 MG/1
25-50 TABLET ORAL 3 TIMES DAILY PRN
Qty: 30 TABLET | Refills: 0 | Status: SHIPPED | OUTPATIENT
Start: 2018-01-16 | End: 2018-06-06

## 2018-01-16 RX ORDER — GABAPENTIN 300 MG/1
600-900 CAPSULE ORAL 3 TIMES DAILY
Qty: 270 CAPSULE | Refills: 0 | Status: SHIPPED | OUTPATIENT
Start: 2018-01-16 | End: 2018-06-06

## 2018-01-16 RX ORDER — AMITRIPTYLINE HYDROCHLORIDE 50 MG/1
50 TABLET ORAL AT BEDTIME
Qty: 30 TABLET | Refills: 0 | Status: SHIPPED | OUTPATIENT
Start: 2018-01-16 | End: 2018-06-06

## 2018-01-16 RX ORDER — LANOLIN ALCOHOL/MO/W.PET/CERES
3 CREAM (GRAM) TOPICAL
COMMUNITY
Start: 2018-01-16 | End: 2018-06-06

## 2018-01-16 RX ORDER — CYCLOBENZAPRINE HCL 10 MG
10 TABLET ORAL 3 TIMES DAILY PRN
Qty: 60 TABLET | Refills: 0 | Status: SHIPPED | OUTPATIENT
Start: 2018-01-16 | End: 2018-06-06

## 2018-01-16 RX ADMIN — ACETAMINOPHEN 1000 MG: 500 TABLET ORAL at 13:39

## 2018-01-16 RX ADMIN — ACETAMINOPHEN 1000 MG: 500 TABLET ORAL at 04:19

## 2018-01-16 RX ADMIN — SENNOSIDES 8.6 MG: 8.6 TABLET, FILM COATED ORAL at 07:56

## 2018-01-16 RX ADMIN — Medication 900 MG: at 13:39

## 2018-01-16 RX ADMIN — Medication 900 MG: at 07:56

## 2018-01-16 NOTE — PLAN OF CARE
Problem: Goal/Outcome  Goal: Goal Outcome Summary  FOCUS/GOAL  Discharge planning    ASSESSMENT, INTERVENTIONS AND CONTINUING PLAN FOR GOAL:  Patient alert and oriented and able to make her needs known. Patient modified independent in her room with a walker. Patient dressed herself independently this morning. Patient denied any difficulty with pain throughout shift until around 1340 and she requested PRN Tylenol for discomfort to BLE. Icy hot patch removed this morning from left buttock. Discharge pharmacy called to let writer/patient know that Icy Hot patches not covered through insurance. Amitriptyline added to patient's discharge medication regimen at . Discharge paperwork reviewed with patient with spouse present. Patient nor spouse had any questions/concerns. Writer provided number to unit if they had any questions. Patient's spouse packed up patient's belongings and NA escorted them to vehicle.

## 2018-01-16 NOTE — PLAN OF CARE
Problem: Individualization  Goal: Patient Individualization  Outcome: Improving  FOCUS/GOAL  Bladder management, Pain management and Medical management    ASSESSMENT, INTERVENTIONS AND CONTINUING PLAN FOR GOAL:  Alert & oriented, able to make needs known, uses call light appropriately. MOD I in room, but requested supervision when going to the bathroom. Continent of bladder with the use of toilet, no BM thus far. Icy hot patches to bilat gluteus in place, requested Tylenol for leg pain along with ice packs with effective results. Pt has been sleeping on/off, noted to be watching movies for some part of the shift.

## 2018-01-16 NOTE — PLAN OF CARE
Problem: Goal/Outcome  Goal: Goal Outcome Summary  Pt is planning to d/c home today, 1/16/18 with continued family support and ongoing home care therapy. Placed referral to [facility] for PT. Sw will continue to assist as needed.

## 2018-01-16 NOTE — PROGRESS NOTES
[facility]   Met with pt and spouse to discuss plans for HC.  Pt to be discharged home 01 16 18    and has agreed to have [facility] follow with services of PT. Patient care support center processing referral.  Pt and spouse verbalized understanding that initial visit is scheduled for 01 17 18 or 01 18 18.    Pt has 24 hour phone number for [facility] for any questions or concerns.

## 2018-01-16 NOTE — PROGRESS NOTES
OK to discharge home today. Reviewed all medications. Will trial on amitriptyline at HS. Meds sent to pharmacy for her to take with. Some of the OTC she's wanting filled by us.  See full d/c summary.

## 2018-01-16 NOTE — CARE CONFERENCE
Acute Rehab Care Conference/Team Rounds      This is an incomplete team rounds note started but didn't complete as Kaleigh will discharge home today.    Type:     Present:       Discharge Barriers/Treatment/Education    Rehab Diagnosis:     Active Medical Co-morbidities/Prognosis:     Safety: Uses call light to make needs known. MOD I in room during the day, supervision during the night     Pain: Uses Tylenol, Flexeril, Diclofenac gel, Icy hot gel, and Tramadol for pain management.    Medications, Skin, Tubes/Lines: Takes whole pills. No skin issues. No tubes or lines    Swallowing/Nutrition:    Bowel/Bladder: Continent of bladder and bowel. LBM 1/15    Psychosocial: Pt resides with her . Goal to return home with continued support from family. Team working towards a safe d/c plan.     ADLs/IADLs:    Mobility:     Cognition/Language:    Community Re-Entry:    Transportation:    Decision maker:     Plan of Care and goals reviewed and updated.    Discharge Plan/Recommendations    Fall Precautions:     Patient/Family input to goals:     Anticipated rehab needs following discharge:     Anticipated care giver support after discharge:     Estimated length of stay:     Overall plan for the patient:

## 2018-01-16 NOTE — PLAN OF CARE
FOCUS/GOAL  Pain management, Discharge planning and Mobility    ASSESSMENT, INTERVENTIONS AND CONTINUING PLAN FOR GOAL:  MOD I in the room with walker and did all her has cares safely   Scheduled Neurontin 900 mg at hs for neuropathic pain pt said her pain is starting to climb up received PRN Flexeril and ice applied on both legs and later on Icy hot patch was applied .  Discharging to home 1/16/ at 1430   Will continue to promote independence with ADLS and keep pt comfortable .

## 2018-01-17 LAB — PNP ABY SERUM: NORMAL

## 2018-02-01 ENCOUNTER — HOSPITAL ENCOUNTER (OUTPATIENT)
Dept: PHYSICAL THERAPY | Facility: CLINIC | Age: 63
Setting detail: THERAPIES SERIES
End: 2018-02-01
Attending: PHYSICAL MEDICINE & REHABILITATION
Payer: COMMERCIAL

## 2018-02-01 PROCEDURE — 97110 THERAPEUTIC EXERCISES: CPT | Mod: GP | Performed by: PHYSICAL THERAPIST

## 2018-02-01 PROCEDURE — 40000719 ZZHC STATISTIC PT DEPARTMENT NEURO VISIT: Performed by: PHYSICAL THERAPIST

## 2018-02-01 PROCEDURE — 97163 PT EVAL HIGH COMPLEX 45 MIN: CPT | Mod: GP | Performed by: PHYSICAL THERAPIST

## 2018-02-01 ASSESSMENT — 6 MINUTE WALK TEST (6MWT)
TOTAL DISTANCE WALKED (FT): 565
COMMENTS: WITH WW

## 2018-02-01 NOTE — PROGRESS NOTES
02/01/18 1400   Quick Adds   Type of Visit Initial OP PT Evaluation   General Information   Start of Care Date 02/01/18   Referring Physician Dr. Channing Gomez   Orders Evaluate and Treat as Indicated   Order Date 01/15/18   Medical Diagnosis AIDP   Onset of illness/injury or Date of Surgery 01/01/18   Surgical/Medical history reviewed Yes   Pertinent history of current problem (include personal factors and/or comorbidities that impact the POC) admitted to [facility] with GBS, 5 rounds of IVIG. Transferred to ARU 1/7-1/16/18 and discharged home with home therapy for a week and transitioned to OP. Pt otherwise healthy prior to diagnosis.   Prior level of function comment independent, working full time and active with exercise. 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 Bathroom has bars, has a chair to sit in kitchen while cooking   Current Assistive Devices Front Wheeled Walker   Assistive Devices Comments using 4WW   Patient/Family Goals Statement I cannot walk due to GBS and I want to be able to walk again   Fall Risk Screen   Fall screen completed by PT   Have you fallen 2 or more times in the past year? No   Have you fallen and had an injury in the past year? No   Timed Up and Go score (seconds) 19.66s with WW   Is patient a fall risk? Yes   Pain   Pain comments muscle soreness and nerve pain previously, now just experiencing sensation changes are that uncomfortable   Cognitive Status Examination   Orientation orientation to person, place and time   Level of Consciousness alert   Follows Commands and Answers Questions 100% of the time   Personal Safety and  Judgment intact   Memory intact   Observation   Observation pleasant, good historian   Integumentary   Integumentary Comments skin is cool to touch from bilateral knees down   Range of Motion (ROM)   ROM Comment impaired- right greater than left and distal greater than proximal   MMT: Hip, Rehab Eval   Hip Flexion - Left Side (4-/5) good minus, left   Hip Extension - Left Side (3/5) fair, left   Hip Flexion - Right Side (4-/5) good minus, right   Hip Extension - Right Side (2-/5) poor minus, right   MMT: Knee, Rehab Eval   Knee Flexion - Left Side (2+/5) poor plus, left   Knee Extension - Left Side (3/5) fair, left   Knee Flexion - Right Side (2+/5) poor plus, right   Knee Extension - Right Side (3/5) fair, right   MMT: Ankle, Rehab Eval   Ankle Dorsiflexion - Left Side (3/5) fair, left   Ankle Plantarflexion - Left Side (4/5) good, left   Ankle Dorsiflexion - Right Side (2-/5) poor minus, right   Ankle Plantarflexion - Right Side (3+/5) fair plus, right   Bed Mobility   Bed Mobility Comments independent   Transfer Skills   Transfer Comments independent with heavy reliance on UE   Gait   Gait Comments ambulates into clinic with WW, needs assist from vision for foot placement when turning or in high traffic areas. Lacks eccentric deceleration from HS during swing phase, appears to throw feet forward with out control.    Gait Special Tests   Gait Special Tests SIX MINUTE WALK TEST   Gait Special Tests Six Minute Walk Test   Feet 565 Feet   Comments with WW   Balance   Balance Comments WBOS EO x13s unassisted, unable to maintain other positions   Balance Special Tests Sit to Stand Reps in 30 Seconds   Reps in 30 seconds 0   Comments with UE assist- 11 with slight forward bend at hips with coming to full standing    Sensory Examination   Sensory Perception Comments impaired- variety of different sensations in her feet and between her toes, general increased cold (perceived and to touch) from knees down   Coordination    Coordination Comments impaired due to ROM deficits   Muscle Tone   Muscle Tone Comments slight atrophy with loss of movement and decrease in activity from baseline   Modality Interventions   Planned Modality Interventions Electrical Stimulation/Russion Stimulation;TENS   Planned Therapy Interventions   Planned Therapy Interventions balance training;gait training;motor coordination training;neuromuscular re-education;ROM;strengthening;stretching;manual therapy;transfer training   Clinical Impression   Criteria for Skilled Therapeutic Interventions Met yes, treatment indicated   PT Diagnosis difficulty with gait   Influenced by the following impairments decreased ROM, strength, activity tolerance, sensation, fatigue, needing AD for walking and fall risk   Functional limitations due to impairments difficutly with gait, balance, transfers, well below baseline for aerobic capacity and regular exercise, unable to work, bend forward without LOB and napping 2x/day   Clinical Presentation Unstable/Unpredictable   Clinical Presentation Rationale unknown return of sensation   Clinical Decision Making (Complexity) High complexity   Therapy Frequency 1 time/week  (per pt preference)   Predicted Duration of Therapy Intervention (days/wks) 90 days   Risk & Benefits of therapy have been explained Yes   Patient, Family & other staff in agreement with plan of care Yes   Education Assessment   Preferred Learning Style Listening   Barriers to Learning No barriers   GOALS   PT Eval Goals 1;2;3;4;5   Goal 1   Goal Identifier TUG   Goal Description 1. Patient will decrease score on the TUG to less than 13.5s without an assistive device in order to decrease her fall risk and improve safety while turning   Target Date 05/02/18   Goal 2   Goal Identifier 6MWT   Goal Description 2. Patient will improve distance ambulated on the 6MWT by greater than 300 feet to improve walking tolerance for community mobility.   Target Date 05/02/18   Goal 3    Goal Identifier FLOREZ   Goal Description 3. Patient will score greater than 45/56 on the Florez balance assessment to decrease fall risk in her home.   Target Date 05/02/18   Goal 4   Goal Identifier FGA   Goal Description 4. Patient will score greater than 24/30 to improve dynamic balance for safety while turning and walking.   Target Date 05/02/18   Goal 5   Goal Identifier STAIRS   Goal Description 5. Patient will be independent with reciprocal stair negotiation without a rail.    Target Date 05/02/18   Goal 6   Goal Identifier BENDING   Goal Description 6. Patient will be safe to bend forward to the ground to  dropped objects from the floor without a reacher.   Target Date 05/02/18   Total Evaluation Time   Total Evaluation Time (Minutes) 35

## 2018-02-08 ENCOUNTER — HOSPITAL ENCOUNTER (OUTPATIENT)
Dept: PHYSICAL THERAPY | Facility: CLINIC | Age: 63
Setting detail: THERAPIES SERIES
End: 2018-02-08
Attending: PHYSICAL MEDICINE & REHABILITATION
Payer: COMMERCIAL

## 2018-02-08 PROCEDURE — 97750 PHYSICAL PERFORMANCE TEST: CPT | Mod: GP | Performed by: PHYSICAL THERAPIST

## 2018-02-08 PROCEDURE — 40000719 ZZHC STATISTIC PT DEPARTMENT NEURO VISIT: Performed by: PHYSICAL THERAPIST

## 2018-02-08 PROCEDURE — 97110 THERAPEUTIC EXERCISES: CPT | Mod: GP | Performed by: PHYSICAL THERAPIST

## 2018-02-13 NOTE — TELEPHONE ENCOUNTER
APPT INFO    Date /Time: 221/18, 12:50pm   Reason for Appt: guillian barre, polyneuropathy   Ref Provider/Clinic: [facility]   Are there internal records? Yes/No?  IF YES, list clinic names: [facility]  [facility]   Are there outside records? Yes/No? No    Patient Contact (Y/N) & Call Details: No, referred    Action:

## 2018-02-15 ENCOUNTER — HOSPITAL ENCOUNTER (OUTPATIENT)
Dept: PHYSICAL THERAPY | Facility: CLINIC | Age: 63
Setting detail: THERAPIES SERIES
End: 2018-02-15
Attending: PHYSICAL MEDICINE & REHABILITATION
Payer: COMMERCIAL

## 2018-02-15 PROCEDURE — 97110 THERAPEUTIC EXERCISES: CPT | Mod: GP | Performed by: PHYSICAL THERAPIST

## 2018-02-15 PROCEDURE — 40000719 ZZHC STATISTIC PT DEPARTMENT NEURO VISIT: Performed by: PHYSICAL THERAPIST

## 2018-02-21 ENCOUNTER — PRE VISIT (OUTPATIENT)
Dept: NEUROLOGY | Facility: CLINIC | Age: 63
End: 2018-02-21

## 2018-02-21 ENCOUNTER — OFFICE VISIT (OUTPATIENT)
Dept: NEUROLOGY | Facility: CLINIC | Age: 63
End: 2018-02-21
Payer: COMMERCIAL

## 2018-02-21 VITALS
BODY MASS INDEX: 21.5 KG/M2 | WEIGHT: 141.9 LBS | DIASTOLIC BLOOD PRESSURE: 64 MMHG | SYSTOLIC BLOOD PRESSURE: 112 MMHG | HEIGHT: 68 IN | HEART RATE: 100 BPM

## 2018-02-21 DIAGNOSIS — G61.0 AIDP (ACUTE INFLAMMATORY DEMYELINATING POLYNEUROPATHY) (H): Primary | ICD-10-CM

## 2018-02-21 NOTE — MR AVS SNAPSHOT
After Visit Summary   2/21/2018    Kaleigh Ziegler    MRN: [id number]           Patient Information     Date Of Birth          1955        Visit Information        Provider Department      2/21/2018 12:50 PM Sachin Allen MD [facility]        Today's Diagnoses     AIDP (acute inflammatory demyelinating polyneuropathy) (H)    -  1      Care Instructions    It was a pleasure to see you in clinic today.    After discussing her continued improvement since hospitalization and stay in acute rehab, we decided upon the following plan:    -Continue with ongoing physical therapies  -Continue with gabapentin 300 mg 3 times daily.  As needed, you may decrease/stop this medication.  -Continue to work with physical therapy in preparation for potential return to work in the next several weeks.  Please let us know if there is any concerns related to your FMLA paperwork    Please return to clinic in the next 3-6 months for ongoing surveillance.    Please message or use my chart if you have any further concerns or questions between now and your next clinic visit.    Sachin Allen MD  Neurology Resident          Follow-ups after your visit        Follow-up notes from your care team     Return in about 3 months (around 5/21/2018) for Routine Visit.      Your next 10 appointments already scheduled     Mar 08, 2018  1:30 PM CST   Neuro Treatment with Jen Nieto, PT   [facility] ([facility])    [address]  [address]  [facility] [address]   [phone]            Mar 14, 2018  1:30 PM CDT   Neuro Treatment with Jen Nieto, PT   [facility] ([facility])    [address]  [address]  [facility] [address]   [phone]            Mar 22, 2018  1:30 PM CDT   Neuro Treatment with Jen Nieto, PT   [facility] ([facility])    [address]  [address]  [facility] [address]  "  [phone]            Mar 29, 2018  1:30 PM CDT   Neuro Treatment with Jen Nieto, PT   [facility] ([facility])    [address]  [address]  [place] [id number] [phone]              Who to contact     Please call your clinic at [phone] to:    Ask questions about your health    Make or cancel appointments    Discuss your medicines    Learn about your test results    Speak to your doctor            Additional Information About Your Visit        [employer] Information     [employer] gives you secure access to your electronic health record. If you see a primary care provider, you can also send messages to your care team and make appointments. If you have questions, please call your primary care clinic.  If you do not have a primary care provider, please call [phone] and they will assist you.      [employer] is an electronic gateway that provides easy, online access to your medical records. With [employer], you can request a clinic appointment, read your test results, renew a prescription or communicate with your care team.     To access your existing account, please contact your [facility] or call [phone] for assistance.        Care EveryWhere ID     This is your Care EveryWhere ID. This could be used by other organizations to access your [place] medical records  [id number]        Your Vitals Were     Pulse Height BMI (Body Mass Index)             100 1.727 m (5' 8\") 21.58 kg/m2          Blood Pressure from Last 3 Encounters:   02/21/18 112/64   01/16/18 117/64   01/07/18 116/74    Weight from Last 3 Encounters:   02/21/18 64.4 kg (141 lb 14.4 oz)   01/15/18 63.4 kg (139 lb 12.8 oz)   01/02/18 65.9 kg (145 lb 4.5 oz)              Today, you had the following     No orders found for display       Primary Care Provider Office Phone # Fax #    [facility] [phone]       [place] [facility] [address] " ZACK LAGOS [facility] [address]  [facility] [address]        Equal Access to Services     LUCAS MEANS : Hadii amairani gil tadeo Holloway, wachatoda luqnicol, qamariita kafariba briideidre, waxmiguelito gioin hayaachris teresasavanna barryernst morin . So [facility] [phone].    ATENCIÓN: Si habla español, tiene a laughlin disposición servicios gratuitos de asistencia lingüística. Llame al [phone].    We comply with applicable federal civil rights laws and [place] laws. We do not discriminate on the basis of race, color, national origin, age, disability, sex, sexual orientation, or gender identity.            Thank you!     Thank you for choosing [facility]  for your care. Our goal is always to provide you with excellent care. Hearing back from our patients is one way we can continue to improve our services. Please take a few minutes to complete the written survey that you may receive in the mail after your visit with us. Thank you!             Your Updated Medication List - Protect others around you: Learn how to safely use, store and throw away your medicines at www.disposemymeds.org.          This list is accurate as of 2/21/18 11:59 PM.  Always use your most recent med list.                   Brand Name Dispense Instructions for use Diagnosis    acetaminophen 500 MG tablet    TYLENOL    60 tablet    Take 2 tablets (1,000 mg) by mouth 4 times daily as needed for mild pain or fever    Neuropathic pain, Muscle pain       amitriptyline 50 MG tablet    ELAVIL    30 tablet    Take 1 tablet (50 mg) by mouth At Bedtime    AIDP (acute inflammatory demyelinating polyneuropathy) (H)       cyclobenzaprine 10 MG tablet    FLEXERIL    60 tablet    Take 1 tablet (10 mg) by mouth 3 times daily as needed for muscle spasms    Muscle pain       diclofenac 1 % Gel topical gel    VOLTAREN    100 g    Place 2 g onto the skin 3 times daily as needed for moderate pain    Muscle pain       gabapentin 300 MG capsule    NEURONTIN    270 capsule    Take 2-3 capsules (600-900 mg) by  mouth 3 times daily    Acute inflammatory demyelinating polyneuropathy (H), Neuropathic pain       melatonin 3 MG tablet      Take 1 tablet (3 mg) by mouth nightly as needed for sleep    Primary insomnia       menthol 5 % Ptch    ICY HOT    30 patch    Apply 2 patches topically 2 times daily as needed for muscle soreness    Neuropathic pain, Muscle pain       sennosides 8.6 MG tablet    SENOKOT    60 tablet    Take 1-2 tablets by mouth 2 times daily as needed for constipation    Slow transit constipation       traMADol 50 MG tablet    ULTRAM    30 tablet    Take 0.5-1 tablets (25-50 mg) by mouth 3 times daily as needed for breakthrough pain    Neuropathic pain, Muscle pain

## 2018-02-21 NOTE — PATIENT INSTRUCTIONS
It was a pleasure to see you in clinic today.    After discussing her continued improvement since hospitalization and stay in acute rehab, we decided upon the following plan:    -Continue with ongoing physical therapies  -Continue with gabapentin 300 mg 3 times daily.  As needed, you may decrease/stop this medication.  -Continue to work with physical therapy in preparation for potential return to work in the next several weeks.  Please let us know if there is any concerns related to your FMLA paperwork    Please return to clinic in the next 3-6 months for ongoing surveillance.    Please message or use my chart if you have any further concerns or questions between now and your next clinic visit.    Sachin Allen MD  Neurology Resident

## 2018-02-21 NOTE — LETTER
"2018       RE: Kaleigh Ziegler  [id number] [facility] [address]     Dear Colleague,    Thank you for referring your patient, Kaleigh Ziegler, to the [facility] NEUROLOGY at [facility]. Please see a copy of my visit note below.      DEPARTMENT OF NEUROLOGY    Patient Name:  Kaleigh Ziegler  MRN:  [id number]    :  1955  Date of Clinic Visit:  2018  Primary Care Provider:  [facility]    CHIEF COMPLAINT: hospital follow-up    HISTORY OF PRESENT ILLNESS:  Kaleigh Ziegler is a 62 year old female presenting for follow-up after hospitalization in 2018 for acute inflammatory demyelinating polyneuropathy.    Patient was discharged from inpatient hospitalization following a 5 day course of IVIG on 2018.  She then had an 11 day stay in acute rehab within the [facility] system.  She notes continued excellent improvement in her overall strength and ability to perform her activities of daily living.  At initial presentation, she was unable to walk due to extreme weakness and had multiple complaints of pain.  She is now able to walk with the use of a walker, including being able to take several steps without the aid of a walker.  Her pain is quite well controlled on 300 mg 3 times daily of gabapentin.  She has had ongoing physical therapy and states that she has \"graduated\" only requiring 1 day per week of PT.  At home, she is able to do some mild cooking, cleaning, reports that within the last several days, she has been able to walk up and down her stairs.  Along with her , they have successfully modified several aspects of her home-including instillation of railings- to help her better deal with her residual weakness and minimize the chance of adverse issues such as falls.  No recurrence of symptoms.  No other new changes.  She is looking forward to hopefully going back to work.  No other new " complaints.    Patient denies any new headaches, blurred vision, double vision, drainage from the ears/eyes/nose, nausea, vomiting, constipation, diarrhea, palpitations, chest pain, shortness of breath, dysuria, frequency, urgency.      REVIEW OF SYSTEMS:  A 10 point review of symptoms is negative except as indicated above.    ALLERGIES:  No Active Allergies  MEDICATIONS:  Current Outpatient Prescriptions   Medication Sig Dispense Refill     gabapentin (NEURONTIN) 300 MG capsule Take 2-3 capsules (600-900 mg) by mouth 3 times daily 270 capsule 0     melatonin 3 MG tablet Take 1 tablet (3 mg) by mouth nightly as needed for sleep       traMADol (ULTRAM) 50 MG tablet Take 0.5-1 tablets (25-50 mg) by mouth 3 times daily as needed for breakthrough pain 30 tablet 0     acetaminophen (TYLENOL) 500 MG tablet Take 2 tablets (1,000 mg) by mouth 4 times daily as needed for mild pain or fever 60 tablet 0     diclofenac (VOLTAREN) 1 % GEL topical gel Place 2 g onto the skin 3 times daily as needed for moderate pain 100 g 0     menthol (ICY HOT) 5 % PTCH Apply 2 patches topically 2 times daily as needed for muscle soreness 30 patch 0     sennosides (SENOKOT) 8.6 MG tablet Take 1-2 tablets by mouth 2 times daily as needed for constipation 60 tablet 0     cyclobenzaprine (FLEXERIL) 10 MG tablet Take 1 tablet (10 mg) by mouth 3 times daily as needed for muscle spasms 60 tablet 0     amitriptyline (ELAVIL) 50 MG tablet Take 1 tablet (50 mg) by mouth At Bedtime 30 tablet 0     PAST MEDICAL HISTORY:  Past Medical History:   Diagnosis Date     Back pain 1/1/2018     H/O magnetic resonance imaging of lumbar spine 1/1/2018    MRI LUMBAR SPINE WITHOUT AND WITH CONTRAST 12/24/2017 12:02 AM    HISTORY: Bilateral leg pain and back pain. Fever. Rule out spine infection. Pain started months ago, but is worse today.   TECHNIQUE: Multiplanar multisequence MRI of the lumbar spine without and with 7 mL Gadavist.   COMPARISON: None.   FINDINGS: The  report is dictated assuming five lumbar-type vertebral bodies, and radiographic co     H/O mitral valve prolapse      Hepatic lesion 1/3/2018    Impression:   1. No definite abnormality of the thoracic spinal cord or abnormal enhancement.  2. No significant spinal canal or neural foraminal stenosis at any level, with minimal multilevel thoracic degenerative disease. 3. Multilevel atypical-appearing benign vertebral hemangiomas, most prominent being involving most of the anterior segment of the T6 vertebral body. No follow-up required of th     Vertebral body hemangioma T6 1/3/2018    Impression:   1. No definite abnormality of the thoracic spinal cord or abnormal enhancement.  2. No significant spinal canal or neural foraminal stenosis at any level, with minimal multilevel thoracic degenerative disease. 3. Multilevel atypical-appearing benign vertebral hemangiomas, most prominent being involving most of the anterior segment of the T6 vertebral body. No follow-up required of th     PAST SURGICAL HISTORY:  Past Surgical History:   Procedure Laterality Date     GYN SURGERY       LAPAROSCOPIC TUBAL LIGATION       SOCIAL HISTORY:  Social History     Social History     Marital status:      Spouse name: N/A     Number of children: N/A     Years of education: N/A     Occupational History     Not on file.     Social History Main Topics     Smoking status: Never Smoker     Smokeless tobacco: Never Used     Alcohol use No     Drug use: No     Sexual activity: Not on file     Other Topics Concern     Not on file     Social History Narrative    Past Medical History:      History of mitral valve prolapse         Past Surgical History:      GYN surgery    Tubal ligation         Family History:      History reviewed. No pertinent family history.           Social History:    Smoking status: Never Smoker    Alcohol use: No     Marital Status:       Presents with  at bedside.     FAMILY HISTORY:  History reviewed.  "No pertinent family history.      PHYSICAL EXAMINATION:    Vitals:   /64  Pulse 100  Ht 1.727 m (5' 8\")  Wt 64.4 kg (141 lb 14.4 oz)  BMI 21.58 kg/m2    -General: Woman sitting comfortably on the chair. No acute distress    -HEENT: No skin discolorations noted, no carotid bruits     -Chest: RRR without murmurs or bruits     -Abdomen: Positive bowel sounds, soft, non-tender, no organomegaly    -Musculoskeletal: No abnormalities noted     -Neurological:     --MS: Patient is alert, attentive, and oriented. Speech is clear and fluid. Names normally. Registration, recall and remote memory intact. Mental math normal.     --CNs: Visual fields are full to confrontation. Normal fundoscopic exam with no visualized vascular changes. Pupils are briskly reactive to light. Visual fields full. Ocular motility full without nystagmus, facial sensation intact, muscles of mastication and facial expression normal, hearing intact, gag and palate elevation normal, sternomastoid and trapezius function normal, tongue motions normal     --Motor: Overall normal muscle tone.  5 out of 5 strength with neck flexion and extension, at the deltoids, biceps, triceps, wrist flexion/extension.  4+/5 with second finger abduction and thumb opposition.  5 out of 5 strength with hip flexion and knee flexion/extension.  4 out of 5 with foot dorsiflexion/plantarflexion.    --Reflexes: 1+ reflexes at knees and biceps and ankles. Plantar responses flexor bilaterally    --Sensory: Light touch, vibration and PP intact bilaterally in upper and lower extremities     --Coordination: Rapidly alternating movements of fingers intact.  Has slow rapid toe tapping. Negative Romberg.     --Gait: Stands with feet normally spaced.  Gait is slow and slightly wide-based.  He has a tendency to lift her right leg higher than normal.      INVESTIGATIONS:   All available and relevant labs, imaging, and other procedures were reviewed with the patient at this visit. "         IMPRESSION AND RECOMMENDATIONS:  66-year-old woman presenting for follow-up after recent hospitalization with acute inflammatory demyelinating polyneuropathy.  Patient is status post a 5 day course of IVIG and stay in acute rehab.  She continues to have ongoing physical therapies.  She shows progressive improvement in her previous weakness.  Notably she is now able to walk and perform numerous activities of daily living that were previously not possible.  She is even likely to return to work in the next several weeks.  Maintained on gabapentin for discomfort in the lower extremities although this is improving.  No new symptoms and she is otherwise without complaint.  From neurology standpoint, would not pursue any further testing nor change to her medication schedule at this time.  We discussed with her that she should continue to follow-up with physical therapy and I would expect her strength to continue to improve.  She may return to work as able and can work with physical therapy to help determine the right time.  We will see her back in clinic in the next 3-6 months as needed.  Patient registered her understanding and agreement with this plan.    This note was completed using dragon software.    Patient was seen and discussed with Dr. Sugar Nathan    Neurology Attending Note:    I have seen and examined the patient with the resident.  I have reviewed the labs and imaging results available.  I agree with the assessment and plan.    Sugar Nathan MD      Again, thank you for allowing me to participate in the care of your patient.      Sincerely,    Sachin Allen MD

## 2018-02-21 NOTE — PROGRESS NOTES
"  DEPARTMENT OF NEUROLOGY    Patient Name:  Kaleigh Ziegler  MRN:  [id number]    :  1955  Date of Clinic Visit:  2018  Primary Care Provider:  [facility]    CHIEF COMPLAINT: hospital follow-up    HISTORY OF PRESENT ILLNESS:  Kaleigh Ziegler is a 62 year old female presenting for follow-up after hospitalization in 2018 for acute inflammatory demyelinating polyneuropathy.    Patient was discharged from inpatient hospitalization following a 5 day course of IVIG on 2018.  She then had an 11 day stay in acute rehab within the [facility].  She notes continued excellent improvement in her overall strength and ability to perform her activities of daily living.  At initial presentation, she was unable to walk due to extreme weakness and had multiple complaints of pain.  She is now able to walk with the use of a walker, including being able to take several steps without the aid of a walker.  Her pain is quite well controlled on 300 mg 3 times daily of gabapentin.  She has had ongoing physical therapy and states that she has \"graduated\" only requiring 1 day per week of PT.  At home, she is able to do some mild cooking, cleaning, reports that within the last several days, she has been able to walk up and down her stairs.  Along with her , they have successfully modified several aspects of her home-including instillation of railings- to help her better deal with her residual weakness and minimize the chance of adverse issues such as falls.  No recurrence of symptoms.  No other new changes.  She is looking forward to hopefully going back to work.  No other new complaints.    Patient denies any new headaches, blurred vision, double vision, drainage from the ears/eyes/nose, nausea, vomiting, constipation, diarrhea, palpitations, chest pain, shortness of breath, dysuria, frequency, urgency.      REVIEW OF SYSTEMS:  A 10 point review of symptoms is negative except " as indicated above.    ALLERGIES:  No Active Allergies  MEDICATIONS:  Current Outpatient Prescriptions   Medication Sig Dispense Refill     gabapentin (NEURONTIN) 300 MG capsule Take 2-3 capsules (600-900 mg) by mouth 3 times daily 270 capsule 0     melatonin 3 MG tablet Take 1 tablet (3 mg) by mouth nightly as needed for sleep       traMADol (ULTRAM) 50 MG tablet Take 0.5-1 tablets (25-50 mg) by mouth 3 times daily as needed for breakthrough pain 30 tablet 0     acetaminophen (TYLENOL) 500 MG tablet Take 2 tablets (1,000 mg) by mouth 4 times daily as needed for mild pain or fever 60 tablet 0     diclofenac (VOLTAREN) 1 % GEL topical gel Place 2 g onto the skin 3 times daily as needed for moderate pain 100 g 0     menthol (ICY HOT) 5 % PTCH Apply 2 patches topically 2 times daily as needed for muscle soreness 30 patch 0     sennosides (SENOKOT) 8.6 MG tablet Take 1-2 tablets by mouth 2 times daily as needed for constipation 60 tablet 0     cyclobenzaprine (FLEXERIL) 10 MG tablet Take 1 tablet (10 mg) by mouth 3 times daily as needed for muscle spasms 60 tablet 0     amitriptyline (ELAVIL) 50 MG tablet Take 1 tablet (50 mg) by mouth At Bedtime 30 tablet 0     PAST MEDICAL HISTORY:  Past Medical History:   Diagnosis Date     Back pain 1/1/2018     H/O magnetic resonance imaging of lumbar spine 1/1/2018    MRI LUMBAR SPINE WITHOUT AND WITH CONTRAST 12/24/2017 12:02 AM    HISTORY: Bilateral leg pain and back pain. Fever. Rule out spine infection. Pain started months ago, but is worse today.   TECHNIQUE: Multiplanar multisequence MRI of the lumbar spine without and with 7 mL Gadavist.   COMPARISON: None.   FINDINGS: The report is dictated assuming five lumbar-type vertebral bodies, and radiographic co     H/O mitral valve prolapse      Hepatic lesion 1/3/2018    Impression:   1. No definite abnormality of the thoracic spinal cord or abnormal enhancement.  2. No significant spinal canal or neural foraminal stenosis at any  "level, with minimal multilevel thoracic degenerative disease. 3. Multilevel atypical-appearing benign vertebral hemangiomas, most prominent being involving most of the anterior segment of the T6 vertebral body. No follow-up required of th     Vertebral body hemangioma T6 1/3/2018    Impression:   1. No definite abnormality of the thoracic spinal cord or abnormal enhancement.  2. No significant spinal canal or neural foraminal stenosis at any level, with minimal multilevel thoracic degenerative disease. 3. Multilevel atypical-appearing benign vertebral hemangiomas, most prominent being involving most of the anterior segment of the T6 vertebral body. No follow-up required of th     PAST SURGICAL HISTORY:  Past Surgical History:   Procedure Laterality Date     GYN SURGERY       LAPAROSCOPIC TUBAL LIGATION       SOCIAL HISTORY:  Social History     Social History     Marital status:      Spouse name: N/A     Number of children: N/A     Years of education: N/A     Occupational History     Not on file.     Social History Main Topics     Smoking status: Never Smoker     Smokeless tobacco: Never Used     Alcohol use No     Drug use: No     Sexual activity: Not on file     Other Topics Concern     Not on file     Social History Narrative    Past Medical History:      History of mitral valve prolapse         Past Surgical History:      GYN surgery    Tubal ligation         Family History:      History reviewed. No pertinent family history.           Social History:    Smoking status: Never Smoker    Alcohol use: No     Marital Status:       Presents with  at bedside.     FAMILY HISTORY:  History reviewed. No pertinent family history.      PHYSICAL EXAMINATION:    Vitals:   /64  Pulse 100  Ht 1.727 m (5' 8\")  Wt 64.4 kg (141 lb 14.4 oz)  BMI 21.58 kg/m2    -General: Woman sitting comfortably on the chair. No acute distress    -HEENT: No skin discolorations noted, no carotid bruits     -Chest: RRR " without murmurs or bruits     -Abdomen: Positive bowel sounds, soft, non-tender, no organomegaly    -Musculoskeletal: No abnormalities noted     -Neurological:     --MS: Patient is alert, attentive, and oriented. Speech is clear and fluid. Names normally. Registration, recall and remote memory intact. Mental math normal.     --CNs: Visual fields are full to confrontation. Normal fundoscopic exam with no visualized vascular changes. Pupils are briskly reactive to light. Visual fields full. Ocular motility full without nystagmus, facial sensation intact, muscles of mastication and facial expression normal, hearing intact, gag and palate elevation normal, sternomastoid and trapezius function normal, tongue motions normal     --Motor: Overall normal muscle tone.  5 out of 5 strength with neck flexion and extension, at the deltoids, biceps, triceps, wrist flexion/extension.  4+/5 with second finger abduction and thumb opposition.  5 out of 5 strength with hip flexion and knee flexion/extension.  4 out of 5 with foot dorsiflexion/plantarflexion.    --Reflexes: 1+ reflexes at knees and biceps and ankles. Plantar responses flexor bilaterally    --Sensory: Light touch, vibration and PP intact bilaterally in upper and lower extremities     --Coordination: Rapidly alternating movements of fingers intact.  Has slow rapid toe tapping. Negative Romberg.     --Gait: Stands with feet normally spaced.  Gait is slow and slightly wide-based.  He has a tendency to lift her right leg higher than normal.      INVESTIGATIONS:   All available and relevant labs, imaging, and other procedures were reviewed with the patient at this visit.         IMPRESSION AND RECOMMENDATIONS:  66-year-old woman presenting for follow-up after recent hospitalization with acute inflammatory demyelinating polyneuropathy.  Patient is status post a 5 day course of IVIG and stay in acute rehab.  She continues to have ongoing physical therapies.  She shows  progressive improvement in her previous weakness.  Notably she is now able to walk and perform numerous activities of daily living that were previously not possible.  She is even likely to return to work in the next several weeks.  Maintained on gabapentin for discomfort in the lower extremities although this is improving.  No new symptoms and she is otherwise without complaint.  From neurology standpoint, would not pursue any further testing nor change to her medication schedule at this time.  We discussed with her that she should continue to follow-up with physical therapy and I would expect her strength to continue to improve.  She may return to work as able and can work with physical therapy to help determine the right time.  We will see her back in clinic in the next 3-6 months as needed.  Patient registered her understanding and agreement with this plan.    This note was completed using dragon software.    Patient was seen and discussed with Dr. Sugar Carranza-Anibal Allen MD  [facility] Department of Neurology PGY3  Pager: [phone]          Neurology Attending Note:    I have seen and examined the patient with the resident.  I have reviewed the labs and imaging results available.  I agree with the assessment and plan.    Sugar Nathan MD

## 2018-03-01 ENCOUNTER — HOSPITAL ENCOUNTER (OUTPATIENT)
Dept: PHYSICAL THERAPY | Facility: CLINIC | Age: 63
Setting detail: THERAPIES SERIES
End: 2018-03-01
Attending: PHYSICAL MEDICINE & REHABILITATION
Payer: COMMERCIAL

## 2018-03-01 PROCEDURE — 97112 NEUROMUSCULAR REEDUCATION: CPT | Mod: GP | Performed by: PHYSICAL THERAPIST

## 2018-03-01 PROCEDURE — 97116 GAIT TRAINING THERAPY: CPT | Mod: GP | Performed by: PHYSICAL THERAPIST

## 2018-03-01 PROCEDURE — 40000719 ZZHC STATISTIC PT DEPARTMENT NEURO VISIT: Performed by: PHYSICAL THERAPIST

## 2018-03-03 ENCOUNTER — HEALTH MAINTENANCE LETTER (OUTPATIENT)
Age: 63
End: 2018-03-03

## 2018-03-08 ENCOUNTER — HOSPITAL ENCOUNTER (OUTPATIENT)
Dept: PHYSICAL THERAPY | Facility: CLINIC | Age: 63
Setting detail: THERAPIES SERIES
End: 2018-03-08
Attending: PHYSICAL MEDICINE & REHABILITATION
Payer: COMMERCIAL

## 2018-03-08 PROCEDURE — 97116 GAIT TRAINING THERAPY: CPT | Mod: GP | Performed by: PHYSICAL THERAPIST

## 2018-03-08 PROCEDURE — 97110 THERAPEUTIC EXERCISES: CPT | Mod: GP | Performed by: PHYSICAL THERAPIST

## 2018-03-08 PROCEDURE — 40000719 ZZHC STATISTIC PT DEPARTMENT NEURO VISIT: Performed by: PHYSICAL THERAPIST

## 2018-03-14 ENCOUNTER — HOSPITAL ENCOUNTER (OUTPATIENT)
Dept: PHYSICAL THERAPY | Facility: CLINIC | Age: 63
Setting detail: THERAPIES SERIES
End: 2018-03-14
Attending: PHYSICAL MEDICINE & REHABILITATION
Payer: COMMERCIAL

## 2018-03-14 PROCEDURE — 97112 NEUROMUSCULAR REEDUCATION: CPT | Mod: GP | Performed by: PHYSICAL THERAPIST

## 2018-03-14 PROCEDURE — 97110 THERAPEUTIC EXERCISES: CPT | Mod: GP | Performed by: PHYSICAL THERAPIST

## 2018-03-14 PROCEDURE — 97116 GAIT TRAINING THERAPY: CPT | Mod: GP | Performed by: PHYSICAL THERAPIST

## 2018-03-14 PROCEDURE — 40000719 ZZHC STATISTIC PT DEPARTMENT NEURO VISIT: Performed by: PHYSICAL THERAPIST

## 2018-03-22 ENCOUNTER — HOSPITAL ENCOUNTER (OUTPATIENT)
Dept: PHYSICAL THERAPY | Facility: CLINIC | Age: 63
Setting detail: THERAPIES SERIES
End: 2018-03-22
Attending: PHYSICAL MEDICINE & REHABILITATION
Payer: COMMERCIAL

## 2018-03-22 PROCEDURE — 40000719 ZZHC STATISTIC PT DEPARTMENT NEURO VISIT: Performed by: PHYSICAL THERAPIST

## 2018-03-22 PROCEDURE — 97112 NEUROMUSCULAR REEDUCATION: CPT | Mod: GP | Performed by: PHYSICAL THERAPIST

## 2018-03-22 PROCEDURE — 97116 GAIT TRAINING THERAPY: CPT | Mod: GP | Performed by: PHYSICAL THERAPIST

## 2018-03-22 PROCEDURE — 97110 THERAPEUTIC EXERCISES: CPT | Mod: GP | Performed by: PHYSICAL THERAPIST

## 2018-03-29 ENCOUNTER — HOSPITAL ENCOUNTER (OUTPATIENT)
Dept: PHYSICAL THERAPY | Facility: CLINIC | Age: 63
Setting detail: THERAPIES SERIES
End: 2018-03-29
Attending: PHYSICAL MEDICINE & REHABILITATION
Payer: COMMERCIAL

## 2018-03-29 PROCEDURE — 40000719 ZZHC STATISTIC PT DEPARTMENT NEURO VISIT: Performed by: PHYSICAL THERAPIST

## 2018-03-29 PROCEDURE — 97112 NEUROMUSCULAR REEDUCATION: CPT | Mod: GP | Performed by: PHYSICAL THERAPIST

## 2018-03-29 PROCEDURE — 97116 GAIT TRAINING THERAPY: CPT | Mod: GP | Performed by: PHYSICAL THERAPIST

## 2018-03-29 PROCEDURE — 97110 THERAPEUTIC EXERCISES: CPT | Mod: GP | Performed by: PHYSICAL THERAPIST

## 2018-04-12 ENCOUNTER — HOSPITAL ENCOUNTER (OUTPATIENT)
Dept: PHYSICAL THERAPY | Facility: CLINIC | Age: 63
Setting detail: THERAPIES SERIES
End: 2018-04-12
Attending: PHYSICAL MEDICINE & REHABILITATION
Payer: COMMERCIAL

## 2018-04-12 PROCEDURE — 97116 GAIT TRAINING THERAPY: CPT | Mod: GP | Performed by: PHYSICAL THERAPIST

## 2018-04-12 PROCEDURE — 97112 NEUROMUSCULAR REEDUCATION: CPT | Mod: GP | Performed by: PHYSICAL THERAPIST

## 2018-04-12 PROCEDURE — 40000719 ZZHC STATISTIC PT DEPARTMENT NEURO VISIT: Performed by: PHYSICAL THERAPIST

## 2018-04-12 PROCEDURE — 97110 THERAPEUTIC EXERCISES: CPT | Mod: GP | Performed by: PHYSICAL THERAPIST

## 2018-05-03 ENCOUNTER — HOSPITAL ENCOUNTER (OUTPATIENT)
Dept: PHYSICAL THERAPY | Facility: CLINIC | Age: 63
Setting detail: THERAPIES SERIES
End: 2018-05-03
Attending: PHYSICAL MEDICINE & REHABILITATION
Payer: COMMERCIAL

## 2018-05-03 PROCEDURE — 40000719 ZZHC STATISTIC PT DEPARTMENT NEURO VISIT: Performed by: PHYSICAL THERAPIST

## 2018-05-03 PROCEDURE — 97750 PHYSICAL PERFORMANCE TEST: CPT | Mod: GP | Performed by: PHYSICAL THERAPIST

## 2018-05-03 PROCEDURE — 97110 THERAPEUTIC EXERCISES: CPT | Mod: GP | Performed by: PHYSICAL THERAPIST

## 2018-05-03 NOTE — PROGRESS NOTES
Outpatient Physical Therapy Progress Note     Patient: Kaleigh Ziegler  : 1955    Beginning/End Dates of Reporting Period:  18 to 5/3/2018    Referring Provider: Dr. Channing Gomez    Therapy Diagnosis: Difficulty with gait      Client Self Report: Patient is doing well, came with walking sticks and reports that she had significant LBP after cane. Is doing better with walking sticks, was able to walk 1 mile yesterday with no notable fatigue afterwards. Is also trying accupuncture next week.     Objective Measurements:  Objective Measure: 6MWT   Details: 850 feet with walking poles (increased from 565 with FWW previously)   Objective Measure: FGA   Details:  with no AD   Objective Measure: Florez   Details: 42/56 (increased from 18/56 at evaluation)     Goals:  Goal Identifier 6MWT   Goal Description 2. Patient will improve distance ambulated on the 6MWT by greater than 300 feet to improve walking tolerance for community mobility.   Target Date 18   Date Met   18   Progress: GOAL MET     Goal Identifier FLOREZ   Goal Description 3. Patient will score greater than 45/56 on the Florez balance assessment to decrease fall risk in her home.   Target Date 18   Date Met      Progress: Scored 42/56 today, still difficulty with static balance tasks (single leg and tandem stance)      Goal Identifier FGA   Goal Description 4. Patient will score greater than 24/30 to improve dynamic balance for safety while turning and walking.   Target Date 18   Date Met      Progress: Scored 14/30 today with no assistive device.      Goal Identifier STAIRS   Goal Description 5. Patient will be independent with reciprocal stair negotiation without a rail.    Target Date 18   Date Met      Progress: Still using a railing for safety, patient reports she has attempted her back steps with no railing a few times but still does not feel stable or safe doing them without supervision.      Goal Identifier  BENDING   Goal Description 6. Patient will be safe to bend forward to the ground to  dropped objects from the floor without a reacher.   Target Date 05/02/18   Date Met   05/03/18   Progress: GOAL MET       Progress Toward Goals:   Progress this reporting period: Patient has shown vast improvements in her independence and safety with functional mobility. She is now using trekking poles as a primary assistive device for longer distances, able to navigate short distance with no AD. She has greatly improved in her standardized scores for balance and gait testing (see objective values) and has met 2 of her goals. She continues to be a fall risk based on FGA score and general ambulation pattern. Has shown compliance with HEP and is very active this time of year- planning to decrease frequency to 1 session every other week to continue modifying HEP and checking in on balance and safety.     Plan:  Continue therapy per current plan of care, goal dates extended to reflect ongoing skilled need to reach goals and progress independence. Decreasing frequency to once every other week.     Discharge:  No

## 2018-05-10 ENCOUNTER — HOSPITAL ENCOUNTER (OUTPATIENT)
Dept: PHYSICAL THERAPY | Facility: CLINIC | Age: 63
Setting detail: THERAPIES SERIES
End: 2018-05-10
Attending: PHYSICAL MEDICINE & REHABILITATION
Payer: COMMERCIAL

## 2018-05-10 PROCEDURE — 40000719 ZZHC STATISTIC PT DEPARTMENT NEURO VISIT: Performed by: PHYSICAL THERAPIST

## 2018-05-10 PROCEDURE — 97112 NEUROMUSCULAR REEDUCATION: CPT | Mod: GP | Performed by: PHYSICAL THERAPIST

## 2018-06-06 ENCOUNTER — OFFICE VISIT (OUTPATIENT)
Dept: NEUROLOGY | Facility: CLINIC | Age: 63
End: 2018-06-06
Payer: COMMERCIAL

## 2018-06-06 VITALS
WEIGHT: 154.5 LBS | BODY MASS INDEX: 23.42 KG/M2 | OXYGEN SATURATION: 97 % | TEMPERATURE: 97.7 F | HEART RATE: 69 BPM | DIASTOLIC BLOOD PRESSURE: 64 MMHG | RESPIRATION RATE: 24 BRPM | SYSTOLIC BLOOD PRESSURE: 104 MMHG | HEIGHT: 68 IN

## 2018-06-06 DIAGNOSIS — G61.0 GBS (GUILLAIN BARRE SYNDROME) (H): Primary | ICD-10-CM

## 2018-06-06 ASSESSMENT — PAIN SCALES - GENERAL: PAINLEVEL: NO PAIN (0)

## 2018-06-06 NOTE — NURSING NOTE
Chief Complaint   Patient presents with     RECHECK     [facility]- NEUROPATHY F/U     Josiah Dunn, CMA

## 2018-06-06 NOTE — LETTER
"2018       RE: Kaleigh Ziegler  [address] [facility] [address]     Dear Colleague,    Thank you for referring your patient, Kaleigh Ziegler, to the [facility] NEUROLOGY at [facility]. Please see a copy of my visit note below.    DEPARTMENT OF NEUROLOGY    Patient Name:  Kaleigh Ziegler  MRN:  [id number]    :  1955  Date of Clinic Visit:  2018  Primary Care Provider:  He[facility] Rose        FOLLOW UP APPOINTMENT: AIDP      HPI:   Kaleigh Ziegler is a 62 year old female presenting for follow-up after hospitalization in 2018 for acute inflammatory demyelinating polyneuropathy.     Patient was discharged from inpatient hospitalization following a 5 day course of IVIG on 2018.  She then had an 11 day stay in acute rehab within the [facility] system.  She notes continued excellent improvement in her overall strength and ability to perform her activities of daily living.  At initial presentation, she was unable to walk due to extreme weakness and had multiple complaints of pain.  She is now able to walk with the use of a walker, including being able to take several steps without the aid of a walker.  Her pain is quite well controlled on 300 mg 3 times daily of gabapentin.  She has had ongoing physical therapy and states that she has \"graduated\" only requiring 1 day per week of PT.  At home, she is able to do some mild cooking, cleaning, reports that within the last several days, she has been able to walk up and down her stairs.  Along with her , they have successfully modified several aspects of her home-including instillation of railings- to help her better deal with her residual weakness and minimize the chance of adverse issues such as falls.  No recurrence of symptoms.  No other new changes.  She is looking forward to hopefully going back to work.  No other new complaints.     Patient denies any new headaches, " "blurred vision, double vision, drainage from the ears/eyes/nose, nausea, vomiting, constipation, diarrhea, palpitations, chest pain, shortness of breath, dysuria, frequency, urgency.      Please see notes from previous encounters for more information about this patient's initial presentation to this clinic.       INTERVAL HISTORY:   In the interim, the patient states that she is largely been doing well.  She continues to show slow but steady improvement with strength, and her abilities to perform her activities of daily living.  She now uses 2 walking sticks intermittently; she does not need these at home.  She no longer uses a walker at all.  She is back to work between 5-7 days per week.  She has been doing prescribed physical therapy as well as acupuncture, and another rehabilitation that she pays out-of-pocket for, called revibe.  Her previously endorsed pain is essentially gone; she weaned herself off of gabapentin and no longer takes any medications for pain.  No new complaints or other concerns today.  She is mainly here for scheduled follow-up to ensure that she is continuing to improve.      Vital signs:  Temp: 97.7  F (36.5  C) Temp src: Oral BP: 104/64 Pulse: 69   Resp: 24 SpO2: 97 %     Height: 172.7 cm (5' 8\") Weight: 70.1 kg (154 lb 8 oz)  Estimated body mass index is 23.49 kg/(m^2) as calculated from the following:    Height as of this encounter: 1.727 m (5' 8\").    Weight as of this encounter: 70.1 kg (154 lb 8 oz).      Examination:   Physical exam is unchanged since last visit.     -General: Woman sitting comfortably on the chair. No acute distress     -HEENT: No skin discolorations noted, no carotid bruits      -Chest: RRR without murmurs or bruits      -Abdomen: Positive bowel sounds, soft, non-tender, no organomegaly     -Musculoskeletal: No abnormalities noted      -Neurological:      --MS: Patient is alert, attentive, and oriented. Speech is clear and fluid. Names normally. Registration, " "recall and remote memory intact. Mental math normal.      --CNs: Visual fields are full to confrontation. Normal fundoscopic exam with no visualized vascular changes. Pupils are briskly reactive to light. Visual fields full. Ocular motility full without nystagmus, facial sensation intact, muscles of mastication and facial expression normal, hearing intact, gag and palate elevation normal, sternomastoid and trapezius function normal, tongue motions normal      --Motor: Overall normal muscle tone.  5 out of 5 strength with neck flexion and extension, at the deltoids, biceps, triceps, wrist flexion/extension.  4+/5 with second finger abduction and thumb opposition.  5 out of 5 strength with hip flexion and knee flexion/extension.  4 + out of 5 with foot dorsiflexion/plantarflexion.     --Reflexes: 1+ reflexes at knees and biceps and ankles. Plantar responses flexor bilaterally     --Sensory: Light touch, vibration and PP intact bilaterally in upper and lower extremities     --Coordination: Rapidly alternating movements of fingers intact. Heel-shin and finger-nose-finger is intact.     --Gait: Stands with feet normally spaced. Gait is steady.      INVESTIGATIONS:  All available and relevant labs, imaging, and other procedures were reviewed with the patient at this visit.       IMPRESSION/RECOMMENDATIONS:   63-year-old woman, presents for follow-up after initial hospitalization with acute inflammatory demyelinating polyneuropathy and subsequent clinic follow-ups.  She had originally had a 5 day course of IVIG followed by a stay in acute rehab.  She is seen again in follow-up today.  She continues to have ongoing physical therapies.  She does both prescribed physical therapy, as well as acupuncture, as well as another \"vibration based\" therapy that she pays for herself.  She continues to improve.  She is now walking intermittently with walking sticks.  She finds her self largely able to perform more of her activities of " daily living, and for longer periods.  She is back to work, working steadily 5-7 hours daily.  Overall, she is quite happy with her progress.  No deficits on examination today.  No other change.  Would recommend that she continue to pursue her physical therapies.  We will see her back at some point in the next 6-12 months to ensure that she continues to improve.  If she has any other issues in the interim, she is to send a message and we may triage from there.  Patient and her  who is present, registered their understanding and agreement with this plan.  We look forward to seeing her back in follow-up again.      Patient has been seen with Dr. Andrew Vallejo who agrees with my assessment and plan    Sachin Allen MD  [facility] Department of Neurology PGY3  Pager: [phone]     I saw and examined this patient, and have read and edited the resident's note.  I agree with the findings, assessment, and plan.    Andrew Vallejo  Staff Neurologist   06/20/18           Again, thank you for allowing me to participate in the care of your patient.      Sincerely,    Sachin Allen MD

## 2018-06-06 NOTE — MR AVS SNAPSHOT
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  Wheezing are encouraged by her continued improvement in ability to perform activities of daily living.  We suspect that she will continue to slowly improve.  We did discuss that would like to see her back in the next 6-12 months in the neurology clinic for ongoing evaluation and care.    Please do not hesitate to call or send a [facility] message if there are any issues in the interim.    Sachin Allen MD  Neurology resident           Follow-ups after your visit        Follow-up notes from your care team     Return in about 1 year (around 6/6/2019).      Who to contact     Please call your clinic at [phone] to:    Ask questions about your health    Make or cancel appointments    Discuss your medicines    Learn about your test results    Speak to your doctor            Additional Information About Your Visit        [employer] Information     [employer] gives you secure access to your electronic health record. If you see a primary care provider, you can also send messages to your care team and make appointments. If you have questions, please call your primary care clinic.  If you do not have a primary care provider, please call [phone] and they will assist you.      [employer] is an electronic gateway that provides easy, online access to your medical records. With [employer], you can request a clinic appointment, read your test results, renew a  "prescription or communicate with your care team.     To access your existing account, please contact your [facility] or call [phone] for assistance.        Care EveryWhere ID     This is your Care EveryWhere ID. This could be used by other organizations to access your [place] medical records  [id number]        Your Vitals Were     Pulse Temperature Respirations Height Pulse Oximetry Breastfeeding?    69 97.7  F (36.5  C) (Oral) 24 1.727 m (5' 8\") 97% No    BMI (Body Mass Index)                   23.49 kg/m2            Blood Pressure from Last 3 Encounters:   06/06/18 104/64   02/21/18 112/64   01/16/18 117/64    Weight from Last 3 Encounters:   06/06/18 70.1 kg (154 lb 8 oz)   02/21/18 64.4 kg (141 lb 14.4 oz)   01/15/18 63.4 kg (139 lb 12.8 oz)              Today, you had the following     No orders found for display       Primary Care Provider Office Phone # Fax #    [facility] Shahana Rose [phone]       [place] [facility] [address]  [place] [address]        Equal Access to Services     FERMIN [facility] : Hadii aad ku hadasho Soomaali, waaxda luqadaha, qaybta kaalmada adeegyada, bryan morin . So [facility] [phone].    ATENCIÓN: Si habla español, tiene a laughlin disposición servicios gratuitos de asistencia lingüística. LlKindred Hospital Lima [phone].    We comply with applicable federal civil rights laws and [place] laws. We do not discriminate on the basis of race, color, national origin, age, disability, sex, sexual orientation, or gender identity.            Thank you!     Thank you for choosing [facility] NEUROLOGY  for your care. Our goal is always to provide you with excellent care. Hearing back from our patients is one way we can continue to improve our services. Please take a few minutes to complete the written survey that you may receive in the mail after your visit with us. Thank you!             Your Updated Medication " List - Protect others around you: Learn how to safely use, store and throw away your medicines at www.disposemymeds.org.          This list is accurate as of 6/6/18 11:59 PM.  Always use your most recent med list.                   Brand Name Dispense Instructions for use Diagnosis    TONYA OIL PO      Take 1 capsule by mouth daily        cyanocolbalamin 25 mcg Tabs quarter-tab    vitamin  B-12     Take 25 mcg by mouth daily        VITAMIN D (CHOLECALCIFEROL) PO      Take 3,000 Units by mouth daily

## 2018-06-06 NOTE — PATIENT INSTRUCTIONS
It was a pleasure to see you in clinic today.    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  Wheezing are encouraged by her continued improvement in ability to perform activities of daily living.  We suspect that she will continue to slowly improve.  We did discuss that would like to see her back in the next 6-12 months in the neurology clinic for ongoing evaluation and care.    Please do not hesitate to call or send a my chart message if there are any issues in the interim.    Sachin Allen MD  Neurology resident

## 2018-06-28 ENCOUNTER — OFFICE VISIT (OUTPATIENT)
Dept: URGENT CARE | Facility: URGENT CARE | Age: 63
End: 2018-06-28
Payer: COMMERCIAL

## 2018-06-28 ENCOUNTER — RADIANT APPOINTMENT (OUTPATIENT)
Dept: GENERAL RADIOLOGY | Facility: CLINIC | Age: 63
End: 2018-06-28
Attending: PHYSICIAN ASSISTANT
Payer: COMMERCIAL

## 2018-06-28 VITALS
DIASTOLIC BLOOD PRESSURE: 74 MMHG | WEIGHT: 150.44 LBS | HEART RATE: 73 BPM | TEMPERATURE: 98.9 F | SYSTOLIC BLOOD PRESSURE: 112 MMHG | BODY MASS INDEX: 22.87 KG/M2

## 2018-06-28 DIAGNOSIS — M25.521 RIGHT ELBOW PAIN: Primary | ICD-10-CM

## 2018-06-28 DIAGNOSIS — S59.901A INJURY OF RIGHT ELBOW, INITIAL ENCOUNTER: ICD-10-CM

## 2018-06-28 DIAGNOSIS — S52.124A CLOSED NONDISPLACED FRACTURE OF HEAD OF RIGHT RADIUS, INITIAL ENCOUNTER: ICD-10-CM

## 2018-06-28 DIAGNOSIS — M25.521 RIGHT ELBOW PAIN: ICD-10-CM

## 2018-06-28 PROCEDURE — 73080 X-RAY EXAM OF ELBOW: CPT | Mod: RT

## 2018-06-28 PROCEDURE — 99214 OFFICE O/P EST MOD 30 MIN: CPT | Performed by: PHYSICIAN ASSISTANT

## 2018-06-28 ASSESSMENT — PAIN SCALES - GENERAL: PAINLEVEL: EXTREME PAIN (8)

## 2018-06-28 NOTE — MR AVS SNAPSHOT
After Visit Summary   6/28/2018    Kaleigh Ziegler    MRN: [id number]           Patient Information     Date Of Birth          1955        Visit Information        Provider Department      6/28/2018 2:55 PM Donato Mckeon PA-C [facility]        Today's Diagnoses     Right elbow pain    -  1    Injury of right elbow, initial encounter        Closed nondisplaced fracture of head of right radius, initial encounter           Follow-ups after your visit        Who to contact     If you have questions or need follow up information about today's clinic visit or your schedule please contact [facility] directly at [phone].  Normal or non-critical lab and imaging results will be communicated to you by MyChart, letter or phone within 4 business days after the clinic has received the results. If you do not hear from us within 7 days, please contact the clinic through [employer] or phone. If you have a critical or abnormal lab result, we will notify you by phone as soon as possible.  Submit refill requests through [employer] or call your pharmacy and they will forward the refill request to us. Please allow 3 business days for your refill to be completed.          Additional Information About Your Visit        MyChart Information     [employer] gives you secure access to your electronic health record. If you see a primary care provider, you can also send messages to your care team and make appointments. If you have questions, please call your primary care clinic.  If you do not have a primary care provider, please call [phone] and they will assist you.        Care EveryWhere ID     This is your Care EveryWhere ID. This could be used by other organizations to access your [place] medical records  [id number]        Your Vitals Were     Pulse Temperature Breastfeeding? BMI (Body Mass Index)          73 98.9  F (37.2  C) (Oral) No 22.87 kg/m2          Blood Pressure from Last 3 Encounters:   06/28/18 112/74   06/06/18 104/64   02/21/18 112/64    Weight from Last 3 Encounters:   06/28/18 150 lb 7 oz (68.2 kg)   06/06/18 154 lb 8 oz (70.1 kg)   02/21/18 141 lb 14.4 oz (64.4 kg)               Primary Care Provider Office Phone # Fax #    [facility] Shahana Rose [phone]       [place] [facility] [address]  [place] [facility] [address]        Equal Access to Services     [facility]: Hadii aad ku hadasho Soomaali, waaxda luqadaha, qaybta kaalmada adeegyada, bryan morin . So [facility] [phone].    ATENCIÓN: Si habla español, tiene a laughlin disposición servicios gratuitos de asistencia lingüística. [facility] [phone].    We comply with applicable federal civil rights laws and [place] laws. We do not discriminate on the basis of race, color, national origin, age, disability, sex, sexual orientation, or gender identity.            Thank you!     Thank you for choosing [place] [facility]  for your care. Our goal is always to provide you with excellent care. Hearing back from our patients is one way we can continue to improve our services. Please take a few minutes to complete the written survey that you may receive in the mail after your visit with us. Thank you!             Your Updated Medication List - Protect others around you: Learn how to safely use, store and throw away your medicines at www.disposemymeds.org.          This list is accurate as of 6/28/18 11:59 PM.  Always use your most recent med list.                   Brand Name Dispense Instructions for use Diagnosis    TONYA OIL PO      Take 1 capsule by mouth daily        cyanocolbalamin 25 mcg Tabs quarter-tab    vitamin  B-12     Take 25 mcg by mouth daily        VITAMIN D (CHOLECALCIFEROL) PO      Take 3,000 Units by mouth daily

## 2018-06-29 NOTE — PROGRESS NOTES
SUBJECTIVE:  Chief Complaint   Patient presents with     Arm Pain     right arm pain/injury from hitting arm against concrete during work today.      Kaleigh Ziegler is a 63 year old female presents with a chief complaint of right elbow pain, injury .  The injury occurred today     How: trauma.  The patient complained of moderate pain  and has had decreased ROM.  Pain exacerbated by movement.  Relieved by rest.  She treated it initially with ice. This is the first time this type of injury has occurred to this patient.     Past Medical History:   Diagnosis Date     Back pain 1/1/2018     H/O magnetic resonance imaging of lumbar spine 1/1/2018    MRI LUMBAR SPINE WITHOUT AND WITH CONTRAST 12/24/2017 12:02 AM    HISTORY: Bilateral leg pain and back pain. Fever. Rule out spine infection. Pain started months ago, but is worse today.   TECHNIQUE: Multiplanar multisequence MRI of the lumbar spine without and with 7 mL Gadavist.   COMPARISON: None.   FINDINGS: The report is dictated assuming five lumbar-type vertebral bodies, and radiographic co     H/O mitral valve prolapse      Hepatic lesion 1/3/2018    Impression:   1. No definite abnormality of the thoracic spinal cord or abnormal enhancement.  2. No significant spinal canal or neural foraminal stenosis at any level, with minimal multilevel thoracic degenerative disease. 3. Multilevel atypical-appearing benign vertebral hemangiomas, most prominent being involving most of the anterior segment of the T6 vertebral body. No follow-up required of th     Vertebral body hemangioma T6 1/3/2018    Impression:   1. No definite abnormality of the thoracic spinal cord or abnormal enhancement.  2. No significant spinal canal or neural foraminal stenosis at any level, with minimal multilevel thoracic degenerative disease. 3. Multilevel atypical-appearing benign vertebral hemangiomas, most prominent being involving most of the anterior segment of the T6 vertebral body. No  follow-up required of th     No Active Allergies  Social History   Substance Use Topics     Smoking status: Never Smoker     Smokeless tobacco: Never Used     Alcohol use No       ROS:  CONSTITUTIONAL:NEGATIVE for fever, chills, change in weight  INTEGUMENTARY/SKIN: NEGATIVE for worrisome rashes, moles or lesions  RESP:NEGATIVE for significant cough or SOB  CV: NEGATIVE for chest pain, palpitations or peripheral edema  MUSCULOSKELETAL: POSITIVE  for right elbow injury, tenderness, pain  NEURO: NEGATIVE for weakness, dizziness or paresthesias    EXAM:   /74  Pulse 73  Temp 98.9  F (37.2  C) (Oral)  Wt 150 lb 7 oz (68.2 kg)  Breastfeeding? No  BMI 22.87 kg/m2  Gen: healthy,alert,no distress  Extremity: DROM .   There not compromise to the distal circulation.  Pulses are +2 and CRT is brisk  GENERAL APPEARANCE: healthy, alert and no distress  EXTREMITIES: peripheral pulses normal  MS:  Positive for inability to supinate or extend right elbow  SKIN: no suspicious lesions or rashes  NEURO: Normal strength and tone, sensory exam grossly normal, mentation intact and speech normal    X-RAY Positive for small radial head fracture Xray read by Donato Mckeon at time of visit    ASSESSMENT/PLAN:    ICD-10-CM    1. Right elbow pain M25.521 XR Elbow Right G/E 3 Views   2. Injury of right elbow, initial encounter S59.901A XR Elbow Right G/E 3 Views   3. Closed nondisplaced fracture of head of right radius, initial encounter S52.124A          Sling   Ice  Tylenol  Follow up with [facility]

## 2018-09-19 ENCOUNTER — PATIENT OUTREACH (OUTPATIENT)
Dept: CARE COORDINATION | Facility: CLINIC | Age: 63
End: 2018-09-19

## 2018-09-20 ENCOUNTER — OFFICE VISIT (OUTPATIENT)
Dept: NEUROLOGY | Facility: CLINIC | Age: 63
End: 2018-09-20
Payer: COMMERCIAL

## 2018-09-20 VITALS
TEMPERATURE: 98.6 F | HEART RATE: 76 BPM | SYSTOLIC BLOOD PRESSURE: 104 MMHG | DIASTOLIC BLOOD PRESSURE: 64 MMHG | OXYGEN SATURATION: 97 %

## 2018-09-20 DIAGNOSIS — M79.10 MUSCLE PAIN: ICD-10-CM

## 2018-09-20 DIAGNOSIS — M79.10 MUSCLE PAIN: Primary | ICD-10-CM

## 2018-09-20 LAB — CK SERPL-CCNC: 81 U/L (ref 30–225)

## 2018-09-20 ASSESSMENT — PAIN SCALES - GENERAL: PAINLEVEL: NO PAIN (0)

## 2018-09-20 NOTE — MR AVS SNAPSHOT
After Visit Summary   9/20/2018    Kaleigh Ziegler    MRN: [id number]           Patient Information     Date Of Birth          1955        Visit Information        Provider Department      9/20/2018 3:20 PM Sachin Allen MD [facility] Neurology        Today's Diagnoses     Muscle pain    -  1      Care Instructions    It was a pleasure to see you in clinic today.    After discussing your symptoms and overall concerns, we decided upon the following plan:    - Go to lab for CK measurement    -       Please call into neurology clinic or send a message via [employer] if you have any further questions or concerns.       Sachin Allen MD  Neurology resident             Follow-ups after your visit        Follow-up notes from your care team     Return if symptoms worsen or fail to improve.      Who to contact     Please call your clinic at [phone] to:    Ask questions about your health    Make or cancel appointments    Discuss your medicines    Learn about your test results    Speak to your doctor            Additional Information About Your Visit        [employer] Information     [employer] gives you secure access to your electronic health record. If you see a primary care provider, you can also send messages to your care team and make appointments. If you have questions, please call your primary care clinic.  If you do not have a primary care provider, please call [phone] and they will assist you.      [employer] is an electronic gateway that provides easy, online access to your medical records. With [employer], you can request a clinic appointment, read your test results, renew a prescription or communicate with your care team.     To access your existing account, please contact your [facility] or call [phone] for assistance.        Care EveryWhere ID     This is your Care EveryWhere ID. This could be used by other organizations to access your [facility]  records  [id number]        Your Vitals Were     Pulse Temperature Pulse Oximetry Breastfeeding?          76 98.6  F (37  C) (Oral) 97% No         Blood Pressure from Last 3 Encounters:   09/20/18 104/64   06/28/18 112/74   06/06/18 104/64    Weight from Last 3 Encounters:   06/28/18 68.2 kg (150 lb 7 oz)   06/06/18 70.1 kg (154 lb 8 oz)   02/21/18 64.4 kg (141 lb 14.4 oz)               Primary Care Provider Office Phone # Fax #    [facility] Shahana Rose [phone]       [place] [facility] [address]  [place] [address]        Equal Access to Services     FERMIN MEANS : Hadii amairani gil hadasho Soomaali, waaxda luqadaha, qaybta kaalmada adeegyada, waxmiguelito morin . So [facility] [phone].    ATENCIÓN: Si habla español, tiene a laughlin disposición servicios gratuitos de asistencia lingüística. Ll[facility] St[facility] [phone].    We comply with applicable federal civil rights laws and [place] laws. We do not discriminate on the basis of race, color, national origin, age, disability, sex, sexual orientation, or gender identity.            Thank you!     Thank you for choosing [facility] NEUROLOGY  for your care. Our goal is always to provide you with excellent care. Hearing back from our patients is one way we can continue to improve our services. Please take a few minutes to complete the written survey that you may receive in the mail after your visit with us. Thank you!             Your Updated Medication List - Protect others around you: Learn how to safely use, store and throw away your medicines at www.disposemymeds.org.          This list is accurate as of 9/20/18 11:59 PM.  Always use your most recent med list.                   Brand Name Dispense Instructions for use Diagnosis    TONYA OIL PO      Take 1 capsule by mouth daily        CO Q 10 PO      Take 1 tablet by mouth daily        cyanocolbalamin 25 mcg Tabs quarter-tab    vitamin  B-12     Take 25 mcg by mouth daily         FISH OIL + D3 PO      Take 1 tablet by mouth        PROBIOTIC ADVANCED PO      Take 1 tablet by mouth daily        VITAMIN C PO      Take 1 tablet by mouth 2 times daily        VITAMIN D (CHOLECALCIFEROL) PO      Take 3,000 Units by mouth daily        VITAMIN E BLEND PO      Take 1 tablet by mouth

## 2018-09-20 NOTE — NURSING NOTE
Chief Complaint   Patient presents with     RECHECK     [facility] RETURN NEUROPATHY      Pt. Reports that at night during the last week, legs have started to ache.    Changed to organic eggs, has been vegan.    Recently got a massage and cause some soreness in legs that has not gone away.     Jie Krueger, EMT

## 2018-09-20 NOTE — PROGRESS NOTES
"  DEPARTMENT OF NEUROLOGY    Patient Name:  Kaleigh Ziegler  MRN:  [id number]    :  1955  Date of Clinic Visit:  2018  Primary Care Provider:  [facility]        FOLLOW UP APPOINTMENT: Muscle pains      Patient summary:  63-year-old woman who was initially seen in the hospital due to an acute case of Guillain-Barré syndrome.  She admitted generally good recovery was discharged to acute rehab.  She has since then been intermittently followed in this clinic to ensure continued neurological improvement.    Please see notes from previous encounters for more information regarding this patient's initial hospital presentation, and subsequent follow-ups in this clinic.      INTERVAL HISTORY:   Patient presents today with new symptoms of muscle pains and general feeling unwell.  She relates her symptoms roughly 1 week ago when she underwent a very deep \"neuro related\" massage.  She did not use this masseuse before, but was recommended to her by a friend.  She did feel that immediately after the massage, her thighs in particular in her calves as well felt very fatigued and with pains.  She went home and then boarded a plane for a preplanned vacation.  She had fair amount of difficulty doing some light walking during his vacation.  She noted that every night she would have return very deep muscle joint pains.  She notes these are quite distinct from the nerve\" pain that she experienced during her Guillain-Barré episode.  No other endorsed symptoms.  She came to ensure that there is no relation to her Guillain-Barré.      Vital signs:  Temp: 98.6  F (37  C) Temp src: Oral BP: 104/64 Pulse: 76     SpO2: 97 %          Estimated body mass index is 22.87 kg/(m^2) as calculated from the following:    Height as of 18: 1.727 m (5' 8\").    Weight as of 18: 68.2 kg (150 lb 7 oz).      Examination:   Physical exam is generally unchanged since last visit.  Minimal tenderness of the muscles of " the thighs and calves.  No ecchymoses or swelling in the lower extremities.  She shows continued improved strength in the lower extremities, particularly with ankle dorsiflexion/plantarflexion.  Reflexes are 1+ and symmetric at the knees and ankles.  Remainder of examination is consistent with those previous.      INVESTIGATIONS:  All available and relevant labs, imaging, and other procedures were reviewed with the patient at this visit.       IMPRESSION/RECOMMENDATIONS:   63-year-old woman with history of Guillain-Barré who is followed in this clinic after hospitalization for said issue.  She presents to clinic today with ongoing muscle planes that are worse at night following a particularly deep tissue massage.  Examination shows no clear swelling in the lower extremities, preserved strength, preserved reflexes.  Did not see any indication that her symptoms are part of her previous Guillain-Barré.  It does appear to be level of muscle breakdown following a deep tissue massage.  I would expect her to continue to improve.  We will check a CK to ensure there is not major underlying muscle breakdown.  Also counseled her on importance of hydration and rest that she continues to improve.  She is to make us aware if there is any other acute change.  Patient registered her understanding and agreement with this plan.    This note was completed using dragon software.  All efforts were made to correct any unintended errors in real-time.    Patient has been seen with Dr. Sophy Umaña who agrees with my assessment and plan    Sachin Allen MD  [facility] Department of Neurology PGY4      Supervisory Note for Dr Allen  I have met the patient and reviewed the above documentation. I agree with the assessment and plan. No sign of recurrent GBS. Most likely this represent a reaction the the type of muscle treatment she received and we would recommend avoiding it in the future.    Sophy Umaña MD  [facility]  Department of Neurology  Pager [phone]

## 2018-09-20 NOTE — PATIENT INSTRUCTIONS
It was a pleasure to see you in clinic today.    After discussing your symptoms and overall concerns, we decided upon the following plan:    - Go to lab for CK measurement    -       Please call into neurology clinic or send a message via [facility] if you have any further questions or concerns.       Sachin Allen MD  Neurology resident

## 2018-09-20 NOTE — LETTER
"2018       RE: Kaleigh Ziegler  [address] [id number]     Dear Colleague,    Thank you for referring your patient, Kaleigh Ziegler, to the [facility] NEUROLOGY at [facility]. Please see a copy of my visit note below.      DEPARTMENT OF NEUROLOGY    Patient Name:  Kaleigh Ziegler  MRN:  [id number]    :  1955  Date of Clinic Visit:  2018  Primary Care Provider:  [facility]        FOLLOW UP APPOINTMENT: Muscle pains      Patient summary:  63-year-old woman who was initially seen in the hospital due to an acute case of Guillain-Barré syndrome.  She admitted generally good recovery was discharged to acute rehab.  She has since then been intermittently followed in this clinic to ensure continued neurological improvement.    Please see notes from previous encounters for more information regarding this patient's initial hospital presentation, and subsequent follow-ups in this clinic.      INTERVAL HISTORY:   Patient presents today with new symptoms of muscle pains and general feeling unwell.  She relates her symptoms roughly 1 week ago when she underwent a very deep \"neuro related\" massage.  She did not use this masseuse before, but was recommended to her by a friend.  She did feel that immediately after the massage, her thighs in particular in her calves as well felt very fatigued and with pains.  She went home and then boarded a plane for a preplanned vacation.  She had fair amount of difficulty doing some light walking during his vacation.  She noted that every night she would have return very deep muscle joint pains.  She notes these are quite distinct from the nerve\" pain that she experienced during her Guillain-Barré episode.  No other endorsed symptoms.  She came to ensure that there is no relation to her Guillain-Barré.      Vital signs:  Temp: 98.6  F (37  C) Temp src: Oral BP: 104/64 Pulse: 76     SpO2: 97 %    " "      Estimated body mass index is 22.87 kg/(m^2) as calculated from the following:    Height as of 6/6/18: 1.727 m (5' 8\").    Weight as of 6/28/18: 68.2 kg (150 lb 7 oz).      Examination:   Physical exam is generally unchanged since last visit.  Minimal tenderness of the muscles of the thighs and calves.  No ecchymoses or swelling in the lower extremities.  She shows continued improved strength in the lower extremities, particularly with ankle dorsiflexion/plantarflexion.  Reflexes are 1+ and symmetric at the knees and ankles.  Remainder of examination is consistent with those previous.      INVESTIGATIONS:  All available and relevant labs, imaging, and other procedures were reviewed with the patient at this visit.       IMPRESSION/RECOMMENDATIONS:   63-year-old woman with history of Guillain-Barré who is followed in this clinic after hospitalization for said issue.  She presents to clinic today with ongoing muscle planes that are worse at night following a particularly deep tissue massage.  Examination shows no clear swelling in the lower extremities, preserved strength, preserved reflexes.  Did not see any indication that her symptoms are part of her previous Guillain-Barré.  It does appear to be level of muscle breakdown following a deep tissue massage.  I would expect her to continue to improve.  We will check a CK to ensure there is not major underlying muscle breakdown.  Also counseled her on importance of hydration and rest that she continues to improve.  She is to make us aware if there is any other acute change.  Patient registered her understanding and agreement with this plan.    This note was completed using dragon software.  All efforts were made to correct any unintended errors in real-time.    Patient has been seen with Dr. Sophy Umaña who agrees with my assessment and plan    Supervisory Note for Dr Allen  I have met the patient and reviewed the above documentation. I agree with the assessment " and plan. No sign of recurrent GBS. Most likely this represent a reaction the the type of muscle treatment she received and we would recommend avoiding it in the future.    Sophy Umaña MD [facility]  Department of Neurology  Pager [phone]    Again, thank you for allowing me to participate in the care of your patient.      Sincerely,    Sachin Allen MD

## 2019-09-06 ENCOUNTER — ANCILLARY PROCEDURE (OUTPATIENT)
Dept: GENERAL RADIOLOGY | Facility: CLINIC | Age: 64
End: 2019-09-06
Attending: FAMILY MEDICINE
Payer: COMMERCIAL

## 2019-09-06 ENCOUNTER — OFFICE VISIT (OUTPATIENT)
Dept: URGENT CARE | Facility: URGENT CARE | Age: 64
End: 2019-09-06
Payer: COMMERCIAL

## 2019-09-06 VITALS
SYSTOLIC BLOOD PRESSURE: 120 MMHG | RESPIRATION RATE: 16 BRPM | TEMPERATURE: 98.4 F | OXYGEN SATURATION: 97 % | HEART RATE: 69 BPM | DIASTOLIC BLOOD PRESSURE: 70 MMHG

## 2019-09-06 DIAGNOSIS — S99.911A INJURY OF RIGHT ANKLE, INITIAL ENCOUNTER: ICD-10-CM

## 2019-09-06 DIAGNOSIS — M25.571 PAIN IN JOINT INVOLVING ANKLE AND FOOT, RIGHT: Primary | ICD-10-CM

## 2019-09-06 PROCEDURE — 73610 X-RAY EXAM OF ANKLE: CPT | Mod: RT

## 2019-09-06 PROCEDURE — 99214 OFFICE O/P EST MOD 30 MIN: CPT | Performed by: FAMILY MEDICINE

## 2019-09-06 NOTE — PROGRESS NOTES
SUBJECTIVE  Kaleigh Ziegler is a 64 year old female who presents today with right ankle pain that occurred few hrs ago.    The mechanism of injury includes: twisted while watering plants in the garden . Pain was sudden onset and moderate  Pain located over the lateral ankle and extending into the lower leg   Therapies to improve symptoms include: ice  History of recurrent ankle injuries: no  Pain is not changed since onset.  Aggravating factors: walking, weight-bearing, movement, repetitive motion and flexion/extension, Relieved byrest and ice.    Past Medical History:   Diagnosis Date     Back pain 1/1/2018     H/O magnetic resonance imaging of lumbar spine 1/1/2018    MRI LUMBAR SPINE WITHOUT AND WITH CONTRAST 12/24/2017 12:02 AM    HISTORY: Bilateral leg pain and back pain. Fever. Rule out spine infection. Pain started months ago, but is worse today.   TECHNIQUE: Multiplanar multisequence MRI of the lumbar spine without and with 7 mL Gadavist.   COMPARISON: None.   FINDINGS: The report is dictated assuming five lumbar-type vertebral bodies, and radiographic co     H/O mitral valve prolapse      Hepatic lesion 1/3/2018    Impression:   1. No definite abnormality of the thoracic spinal cord or abnormal enhancement.  2. No significant spinal canal or neural foraminal stenosis at any level, with minimal multilevel thoracic degenerative disease. 3. Multilevel atypical-appearing benign vertebral hemangiomas, most prominent being involving most of the anterior segment of the T6 vertebral body. No follow-up required of th     Vertebral body hemangioma T6 1/3/2018    Impression:   1. No definite abnormality of the thoracic spinal cord or abnormal enhancement.  2. No significant spinal canal or neural foraminal stenosis at any level, with minimal multilevel thoracic degenerative disease. 3. Multilevel atypical-appearing benign vertebral hemangiomas, most prominent being involving most of the anterior segment of the T6  vertebral body. No follow-up required of th     Current Outpatient Medications   Medication Sig Dispense Refill     Ascorbic Acid (VITAMIN C PO) Take 1 tablet by mouth 2 times daily       TONYA OIL PO Take 1 capsule by mouth daily       Coenzyme Q10 (CO Q 10 PO) Take 1 tablet by mouth daily       cyanocolbalamin (VITAMIN  B-12) 25 mcg TABS quarter-tab Take 25 mcg by mouth daily       Fish Oil-Cholecalciferol (FISH OIL + D3 PO) Take 1 tablet by mouth       Probiotic Product (PROBIOTIC ADVANCED PO) Take 1 tablet by mouth daily       UNABLE TO FIND MEDICATION NAME: Bone strength supplement       VITAMIN D, CHOLECALCIFEROL, PO Take 3,000 Units by mouth daily       VITAMIN E BLEND PO Take 1 tablet by mouth       Social History     Tobacco Use     Smoking status: Never Smoker     Smokeless tobacco: Never Used   Substance Use Topics     Alcohol use: No       ROS:  10 point ROS of systems including Constitutional, Eyes, Respiratory, Cardiovascular, Gastroenterology, Genitourinary, Integumentary,Psychiatric were all negative except for pertinent positives noted in my HPI           OBJECTIVE:  /70   Pulse 69   Temp 98.4  F (36.9  C)   Resp 16   SpO2 97%   EXAM: Patient appears alert,no apparent distress.  Ankle Exam: right    Inspection: swelling around the lateral malleolus    Palpation: tender over lateral malleolus    Both doralis pedis and posterior tibial pulses intact yes    Special Maneuvers: Pain with flexion  Pain with extension  Neuro:Normal strength and tone, sensory exam grossly normal    X-Ray: degenerative changes  offical reading pending,normal mortise, no loose bodies, no fractures seen    ASSESSMENTInversion ankle strain  Kaleigh was seen today for fall.    Diagnoses and all orders for this visit:    Pain in joint involving ankle and foot, right  -     XR Ankle Right G/E 3 Views    Injury of right ankle, initial encounter          PLAN  Activity Modification  Ice, elevate, weight bear as  tolerated  Elevate  Ibuprofen 600-800mg po tid prn  Tylenol as needed for pain  Follow up in 1 week.  Advised to wear cam walker to help with pain   Follow up if  symptoms fail to improve or worsens   Pt understood and agreed with plan     Desi Spain MD

## 2019-11-06 ENCOUNTER — HEALTH MAINTENANCE LETTER (OUTPATIENT)
Age: 64
End: 2019-11-06

## 2020-02-16 ENCOUNTER — HEALTH MAINTENANCE LETTER (OUTPATIENT)
Age: 65
End: 2020-02-16

## 2020-06-14 ENCOUNTER — OFFICE VISIT (OUTPATIENT)
Dept: URGENT CARE | Facility: URGENT CARE | Age: 65
End: 2020-06-14
Payer: COMMERCIAL

## 2020-06-14 ENCOUNTER — NURSE TRIAGE (OUTPATIENT)
Dept: NURSING | Facility: CLINIC | Age: 65
End: 2020-06-14

## 2020-06-14 VITALS
OXYGEN SATURATION: 99 % | DIASTOLIC BLOOD PRESSURE: 70 MMHG | HEART RATE: 69 BPM | TEMPERATURE: 97.6 F | SYSTOLIC BLOOD PRESSURE: 115 MMHG

## 2020-06-14 DIAGNOSIS — K57.92 DIVERTICULITIS: Primary | ICD-10-CM

## 2020-06-14 LAB
ALBUMIN SERPL-MCNC: 3.7 G/DL (ref 3.4–5)
ALP SERPL-CCNC: 69 U/L (ref 40–150)
ALT SERPL W P-5'-P-CCNC: 20 U/L (ref 0–50)
ANION GAP SERPL CALCULATED.3IONS-SCNC: 3 MMOL/L (ref 3–14)
AST SERPL W P-5'-P-CCNC: 18 U/L (ref 0–45)
BASOPHILS # BLD AUTO: 0 10E9/L (ref 0–0.2)
BASOPHILS NFR BLD AUTO: 0.3 %
BILIRUB SERPL-MCNC: 1 MG/DL (ref 0.2–1.3)
BUN SERPL-MCNC: 9 MG/DL (ref 7–30)
CALCIUM SERPL-MCNC: 8.8 MG/DL (ref 8.5–10.1)
CHLORIDE SERPL-SCNC: 108 MMOL/L (ref 94–109)
CO2 SERPL-SCNC: 28 MMOL/L (ref 20–32)
CREAT SERPL-MCNC: 0.67 MG/DL (ref 0.52–1.04)
DIFFERENTIAL METHOD BLD: ABNORMAL
EOSINOPHIL # BLD AUTO: 0 10E9/L (ref 0–0.7)
EOSINOPHIL NFR BLD AUTO: 0.6 %
ERYTHROCYTE [DISTWIDTH] IN BLOOD BY AUTOMATED COUNT: 12.6 % (ref 10–15)
GFR SERPL CREATININE-BSD FRML MDRD: >90 ML/MIN/{1.73_M2}
GLUCOSE SERPL-MCNC: 105 MG/DL (ref 70–99)
HCT VFR BLD AUTO: 46.9 % (ref 35–47)
HGB BLD-MCNC: 15.8 G/DL (ref 11.7–15.7)
LYMPHOCYTES # BLD AUTO: 1.1 10E9/L (ref 0.8–5.3)
LYMPHOCYTES NFR BLD AUTO: 15.7 %
MCH RBC QN AUTO: 31.9 PG (ref 26.5–33)
MCHC RBC AUTO-ENTMCNC: 33.7 G/DL (ref 31.5–36.5)
MCV RBC AUTO: 95 FL (ref 78–100)
MONOCYTES # BLD AUTO: 0.5 10E9/L (ref 0–1.3)
MONOCYTES NFR BLD AUTO: 6.4 %
NEUTROPHILS # BLD AUTO: 5.4 10E9/L (ref 1.6–8.3)
NEUTROPHILS NFR BLD AUTO: 77 %
PLATELET # BLD AUTO: 216 10E9/L (ref 150–450)
POTASSIUM SERPL-SCNC: 3.8 MMOL/L (ref 3.4–5.3)
PROT SERPL-MCNC: 7.6 G/DL (ref 6.8–8.8)
RBC # BLD AUTO: 4.96 10E12/L (ref 3.8–5.2)
SODIUM SERPL-SCNC: 139 MMOL/L (ref 133–144)
WBC # BLD AUTO: 7 10E9/L (ref 4–11)

## 2020-06-14 PROCEDURE — 80053 COMPREHEN METABOLIC PANEL: CPT | Performed by: PHYSICIAN ASSISTANT

## 2020-06-14 PROCEDURE — 85025 COMPLETE CBC W/AUTO DIFF WBC: CPT | Performed by: PHYSICIAN ASSISTANT

## 2020-06-14 PROCEDURE — 36415 COLL VENOUS BLD VENIPUNCTURE: CPT | Performed by: PHYSICIAN ASSISTANT

## 2020-06-14 PROCEDURE — 99214 OFFICE O/P EST MOD 30 MIN: CPT | Performed by: PHYSICIAN ASSISTANT

## 2020-06-14 RX ORDER — ACETAMINOPHEN 500 MG
1000 TABLET ORAL ONCE
Status: DISCONTINUED | OUTPATIENT
Start: 2020-06-14 | End: 2020-06-14

## 2020-06-14 RX ADMIN — Medication 1000 MG: at 18:43

## 2020-06-14 NOTE — PROGRESS NOTES
URGENT CARE VISIT:    SUBJECTIVE:   Kaleigh Ziegler is a 65 year old female who presents with abdominal pain for 1 day. Abdominal pain is located over LLQ and is described as sharp. Pain timing/severity is described as gradual onset, constant worsening and moderate in pain.  Pain is improved by nothing and worsened by movement. No radiation of pain. Associated symptoms include none. She denies nausea, vomiting, diarrhea, constipation, dysuria, frequency, fever and chills. She has tried Tums with no relief of symptoms.  Appetite is decreased. Risk factors include none. This is first episode. Abdominal surgical history includes tubal ligation.     PMH:   Past Medical History:   Diagnosis Date     Back pain 1/1/2018     H/O magnetic resonance imaging of lumbar spine 1/1/2018    MRI LUMBAR SPINE WITHOUT AND WITH CONTRAST 12/24/2017 12:02 AM    HISTORY: Bilateral leg pain and back pain. Fever. Rule out spine infection. Pain started months ago, but is worse today.   TECHNIQUE: Multiplanar multisequence MRI of the lumbar spine without and with 7 mL Gadavist.   COMPARISON: None.   FINDINGS: The report is dictated assuming five lumbar-type vertebral bodies, and radiographic co     H/O mitral valve prolapse      Hepatic lesion 1/3/2018    Impression:   1. No definite abnormality of the thoracic spinal cord or abnormal enhancement.  2. No significant spinal canal or neural foraminal stenosis at any level, with minimal multilevel thoracic degenerative disease. 3. Multilevel atypical-appearing benign vertebral hemangiomas, most prominent being involving most of the anterior segment of the T6 vertebral body. No follow-up required of th     Vertebral body hemangioma T6 1/3/2018    Impression:   1. No definite abnormality of the thoracic spinal cord or abnormal enhancement.  2. No significant spinal canal or neural foraminal stenosis at any level, with minimal multilevel thoracic degenerative disease. 3. Multilevel  atypical-appearing benign vertebral hemangiomas, most prominent being involving most of the anterior segment of the T6 vertebral body. No follow-up required of      Allergies: Patient has no known allergies.  Medications:   Current Outpatient Medications   Medication Sig Dispense Refill     amoxicillin-clavulanate (AUGMENTIN) 875-125 MG tablet Take 1 tablet by mouth 2 times daily for 10 days 20 tablet 0     Ascorbic Acid (VITAMIN C PO) Take 1 tablet by mouth 2 times daily       TONYA OIL PO Take 1 capsule by mouth daily       Coenzyme Q10 (CO Q 10 PO) Take 1 tablet by mouth daily       cyanocolbalamin (VITAMIN  B-12) 25 mcg TABS quarter-tab Take 25 mcg by mouth daily       Fish Oil-Cholecalciferol (FISH OIL + D3 PO) Take 1 tablet by mouth       Probiotic Product (PROBIOTIC ADVANCED PO) Take 1 tablet by mouth daily       UNABLE TO FIND MEDICATION NAME: Bone strength supplement       VITAMIN D, CHOLECALCIFEROL, PO Take 3,000 Units by mouth daily       VITAMIN E BLEND PO Take 1 tablet by mouth       Social History:   Social History     Socioeconomic History     Marital status:      Spouse name: Not on file     Number of children: Not on file     Years of education: Not on file     Highest education level: Not on file   Occupational History     Not on file   Social Needs     Financial resource strain: Not on file     Food insecurity     Worry: Not on file     Inability: Not on file     Transportation needs     Medical: Not on file     Non-medical: Not on file   Tobacco Use     Smoking status: Never Smoker     Smokeless tobacco: Never Used   Substance and Sexual Activity     Alcohol use: No     Drug use: No     Sexual activity: Yes     Partners: Male   Lifestyle     Physical activity     Days per week: Not on file     Minutes per session: Not on file     Stress: Not on file   Relationships     Social connections     Talks on phone: Not on file     Gets together: Not on file     Attends Taoism service: Not on  file     Active member of club or organization: Not on file     Attends meetings of clubs or organizations: Not on file     Relationship status: Not on file     Intimate partner violence     Fear of current or ex partner: Not on file     Emotionally abused: Not on file     Physically abused: Not on file     Forced sexual activity: Not on file   Other Topics Concern     Parent/sibling w/ CABG, MI or angioplasty before 65F 55M? No   Social History Narrative    Past Medical History:      History of mitral valve prolapse         Past Surgical History:      GYN surgery    Tubal ligation         Family History:      History reviewed. No pertinent family history.           Social History:    Smoking status: Never Smoker    Alcohol use: No     Marital Status:       Presents with  at bedside.       ROS: ROS otherwise found to be negative except as noted above.     OBJECTIVE:  /70   Pulse 69   Temp 97.6  F (36.4  C) (Tympanic)   SpO2 99%   GENERAL APPEARANCE: healthy, alert and no distress  EYES: EOMI,  PERRL, conjunctiva clear  RESP: lungs clear to auscultation - no rales, rhonchi or wheezes  CV: regular rates and rhythm, normal S1 S2, no murmur noted  ABDOMEN: soft, tenderness marked and rebound present LLQ, hypoactive bowel sounds  SKIN: no suspicious lesions or rashes    LABS:  Results for orders placed or performed in visit on 06/14/20   CBC with platelets and differential     Status: Abnormal   Result Value Ref Range    WBC 7.0 4.0 - 11.0 10e9/L    RBC Count 4.96 3.8 - 5.2 10e12/L    Hemoglobin 15.8 (H) 11.7 - 15.7 g/dL    Hematocrit 46.9 35.0 - 47.0 %    MCV 95 78 - 100 fl    MCH 31.9 26.5 - 33.0 pg    MCHC 33.7 31.5 - 36.5 g/dL    RDW 12.6 10.0 - 15.0 %    Platelet Count 216 150 - 450 10e9/L    % Neutrophils 77.0 %    % Lymphocytes 15.7 %    % Monocytes 6.4 %    % Eosinophils 0.6 %    % Basophils 0.3 %    Absolute Neutrophil 5.4 1.6 - 8.3 10e9/L    Absolute Lymphocytes 1.1 0.8 - 5.3 10e9/L     Absolute Monocytes 0.5 0.0 - 1.3 10e9/L    Absolute Eosinophils 0.0 0.0 - 0.7 10e9/L    Absolute Basophils 0.0 0.0 - 0.2 10e9/L    Diff Method Automated Method    Comprehensive metabolic panel     Status: Abnormal   Result Value Ref Range    Sodium 139 133 - 144 mmol/L    Potassium 3.8 3.4 - 5.3 mmol/L    Chloride 108 94 - 109 mmol/L    Carbon Dioxide 28 20 - 32 mmol/L    Anion Gap 3 3 - 14 mmol/L    Glucose 105 (H) 70 - 99 mg/dL    Urea Nitrogen 9 7 - 30 mg/dL    Creatinine 0.67 0.52 - 1.04 mg/dL    GFR Estimate >90 >60 mL/min/[1.73_m2]    GFR Estimate If Black >90 >60 mL/min/[1.73_m2]    Calcium 8.8 8.5 - 10.1 mg/dL    Bilirubin Total 1.0 0.2 - 1.3 mg/dL    Albumin 3.7 3.4 - 5.0 g/dL    Protein Total 7.6 6.8 - 8.8 g/dL    Alkaline Phosphatase 69 40 - 150 U/L    ALT 20 0 - 50 U/L    AST 18 0 - 45 U/L        ASSESSMENT:     ICD-10-CM    1. Diverticulitis  K57.92 CBC with platelets and differential     Comprehensive metabolic panel     amoxicillin-clavulanate (AUGMENTIN) 875-125 MG tablet     acetaminophen (TYLENOL) tablet 1,000 mg        PLAN:  Patient Instructions   Patient was educated on the natural course of condition.  High suspicion for diverticulitis based on clinical exam. Discussed treating empirically vs getting definitive diagnosis by a CT scan at the ER. Patient decided to treat empirically. Take medications as prescribed. Side effects discussed. Conservative measures discussed clear liquid diet for 3-5 days and over-the-counter analgesics (Tylenol). Advance diet as tolerated. See your primary care provider if symptoms worsen or do not improve in 3 days. Seek emergency care if you develop worsening abdominal pain, or fever.      Patient verbalized understanding and is agreeable to plan. The patient was discharged ambulatory and in stable condition.    Stacie Rushing PA-C ....................  6/14/2020   5:40 PM

## 2020-06-14 NOTE — PATIENT INSTRUCTIONS
Patient was educated on the natural course of condition.  High suspicion for diverticulitis based on exam. Take medications as prescribed. Side effects discussed including diarrhea. Conservative measures discussed clear liquid diet for 3-5 days, and over-the-counter analgesics (Tylenol 1000 mg every 6 hours as needed). Advance diet as tolerated. See your primary care provider if symptoms worsen or do not improve in 3 days. Seek emergency care if you develop worsening abdominal pain, or fever.     Patient Education     Diverticulitis    Some people get pouches along the wall of the colon as they get older. The pouches, called diverticuli, usually cause no symptoms. If the pouches become blocked, you can get an infection. This infection is called diverticulitis. It causes pain in your lower abdomen and fever. If not treated, it can become a serious condition, causing an abscess to form inside the pouch. The abscess may block the intestinal tract even or rupture, spreading infection throughout the abdomen.  When treatment is started early, oral antibiotics alone may be enough to cure diverticulitis. This method is tried first. But, if you don't improve or if your condition gets worse while using oral antibiotics, you may need to be admitted to the hospital for IV antibiotics. Severe cases may require surgery.  Home care  The following guidelines will help you care for yourself at home:    During the acute illness, rest and follow your healthcare provider's instructions about diet. Sometimes you will need to follow a clear liquid diet to rest your bowel. Once your symptoms are better, you may be told to follow a low-fiber diet for some time. Include foods like:  ? Flake cereal, mashed potatoes, pancakes, waffles, pasta, white bread, rice, applesauce, bananas, eggs, fish, poultry, tofu, and cooked soft vegetables    Take antibiotics exactly as instructed. Don't miss any doses or stop taking the medication, even if you  feel better.    Monitor your temperature and tell your healthcare provider if you have rising temperatures.  Preventing future attacks  Once you have an episode of diverticulitis, you are at risk for having it again. After you have recovered from this episode, you may be able to lower your risk by eating a high-fiber diet (20 gm/day to 35 gm/day of fiber). This cleans out the colon pouches that already exist and may prevent new ones from forming. Foods high in fiber include fresh fruits and edible peelings, raw or lightly cooked vegetables, whole grain cereals and breads, dried beans and peas, and bran.  Other steps that can help prevent future attacks include:    Take your medicines, such as antibiotics, as your healthcare provider says.    Drink 6 to 8 glasses of water every day, unless told otherwise.    Use a heating pad or hot water bottle to help abdominal cramping or pain.    Begin an exercise program. Ask your healthcare provider how to get started. You can benefit from simple activities such as walking or gardening.    Treat diarrhea with a bland diet. Start with liquids only; then slowly add fiber over time.    Watch for changes in your bowel movements (constipation to diarrhea). Avoid constipation by eating a high fiber diet and taking a stool softener if needed.    Get plenty of rest and sleep.  Follow-up care  Follow up with your healthcare provider as advised or sooner if you are not getting better in the next 2 days.  When to seek medical advice  Call your healthcare provider right away if any of these occur:    Fever of 100.4 F (38 C) or higher, or as directed by your healthcare provider    Repeated vomiting or swelling of the abdomen    Weakness, dizziness, light-headedness    Pain in your abdomen that gets worse, severe, or spreads to your back    Pain that moves to the right lower abdomen    Rectal bleeding (stools that are red, black or maroon color)    Unexpected vaginal bleeding  Date Last  Reviewed: 9/1/2016 2000-2019 [employer], "[employer]". [address] [place] [address], [place] [address]. All rights reserved. This information is not intended as a substitute for professional medical care. Always follow your healthcare professional's instructions.

## 2020-06-15 ENCOUNTER — HOSPITAL ENCOUNTER (INPATIENT)
Facility: CLINIC | Age: 65
LOS: 1 days | Discharge: HOME | DRG: 389 | End: 2020-06-16
Attending: EMERGENCY MEDICINE | Admitting: INTERNAL MEDICINE
Payer: COMMERCIAL

## 2020-06-15 ENCOUNTER — APPOINTMENT (OUTPATIENT)
Dept: CT IMAGING | Facility: CLINIC | Age: 65
DRG: 389 | End: 2020-06-15
Attending: EMERGENCY MEDICINE
Payer: COMMERCIAL

## 2020-06-15 ENCOUNTER — PREP FOR PROCEDURE (OUTPATIENT)
Dept: SURGERY | Facility: CLINIC | Age: 65
End: 2020-06-15

## 2020-06-15 DIAGNOSIS — K56.609 SMALL BOWEL OBSTRUCTION (H): ICD-10-CM

## 2020-06-15 DIAGNOSIS — K56.609 SMALL BOWEL OBSTRUCTION (H): Primary | ICD-10-CM

## 2020-06-15 LAB
ALBUMIN SERPL-MCNC: 4 G/DL (ref 3.4–5)
ALP SERPL-CCNC: 75 U/L (ref 40–150)
ALT SERPL W P-5'-P-CCNC: 22 U/L (ref 0–50)
ANION GAP SERPL CALCULATED.3IONS-SCNC: 6 MMOL/L (ref 3–14)
AST SERPL W P-5'-P-CCNC: 19 U/L (ref 0–45)
BASOPHILS # BLD AUTO: 0 10E9/L (ref 0–0.2)
BASOPHILS NFR BLD AUTO: 0.1 %
BILIRUB SERPL-MCNC: 1.3 MG/DL (ref 0.2–1.3)
BUN SERPL-MCNC: 11 MG/DL (ref 7–30)
CALCIUM SERPL-MCNC: 9.6 MG/DL (ref 8.5–10.1)
CHLORIDE SERPL-SCNC: 103 MMOL/L (ref 94–109)
CO2 SERPL-SCNC: 28 MMOL/L (ref 20–32)
CREAT SERPL-MCNC: 0.65 MG/DL (ref 0.52–1.04)
DIFFERENTIAL METHOD BLD: ABNORMAL
EOSINOPHIL # BLD AUTO: 0 10E9/L (ref 0–0.7)
EOSINOPHIL NFR BLD AUTO: 0 %
ERYTHROCYTE [DISTWIDTH] IN BLOOD BY AUTOMATED COUNT: 12.5 % (ref 10–15)
GFR SERPL CREATININE-BSD FRML MDRD: >90 ML/MIN/{1.73_M2}
GLUCOSE SERPL-MCNC: 131 MG/DL (ref 70–99)
HCT VFR BLD AUTO: 47 % (ref 35–47)
HGB BLD-MCNC: 16.2 G/DL (ref 11.7–15.7)
IMM GRANULOCYTES # BLD: 0 10E9/L (ref 0–0.4)
IMM GRANULOCYTES NFR BLD: 0.1 %
LIPASE SERPL-CCNC: 116 U/L (ref 73–393)
LYMPHOCYTES # BLD AUTO: 0.6 10E9/L (ref 0.8–5.3)
LYMPHOCYTES NFR BLD AUTO: 7.3 %
MCH RBC QN AUTO: 32.3 PG (ref 26.5–33)
MCHC RBC AUTO-ENTMCNC: 34.5 G/DL (ref 31.5–36.5)
MCV RBC AUTO: 94 FL (ref 78–100)
MONOCYTES # BLD AUTO: 0.3 10E9/L (ref 0–1.3)
MONOCYTES NFR BLD AUTO: 4 %
NEUTROPHILS # BLD AUTO: 7.1 10E9/L (ref 1.6–8.3)
NEUTROPHILS NFR BLD AUTO: 88.5 %
NRBC # BLD AUTO: 0 10*3/UL
NRBC BLD AUTO-RTO: 0 /100
PLATELET # BLD AUTO: 241 10E9/L (ref 150–450)
POTASSIUM SERPL-SCNC: 3.5 MMOL/L (ref 3.4–5.3)
PROT SERPL-MCNC: 8 G/DL (ref 6.8–8.8)
RBC # BLD AUTO: 5.01 10E12/L (ref 3.8–5.2)
SARS-COV-2 RNA SPEC QL NAA+PROBE: NOT DETECTED
SODIUM SERPL-SCNC: 137 MMOL/L (ref 133–144)
SPECIMEN SOURCE: NORMAL
WBC # BLD AUTO: 8.1 10E9/L (ref 4–11)

## 2020-06-15 PROCEDURE — 96375 TX/PRO/DX INJ NEW DRUG ADDON: CPT

## 2020-06-15 PROCEDURE — 96376 TX/PRO/DX INJ SAME DRUG ADON: CPT

## 2020-06-15 PROCEDURE — 74177 CT ABD & PELVIS W/CONTRAST: CPT

## 2020-06-15 PROCEDURE — 99222 1ST HOSP IP/OBS MODERATE 55: CPT | Mod: AI | Performed by: INTERNAL MEDICINE

## 2020-06-15 PROCEDURE — 96361 HYDRATE IV INFUSION ADD-ON: CPT

## 2020-06-15 PROCEDURE — U0003 INFECTIOUS AGENT DETECTION BY NUCLEIC ACID (DNA OR RNA); SEVERE ACUTE RESPIRATORY SYNDROME CORONAVIRUS 2 (SARS-COV-2) (CORONAVIRUS DISEASE [COVID-19]), AMPLIFIED PROBE TECHNIQUE, MAKING USE OF HIGH THROUGHPUT TECHNOLOGIES AS DESCRIBED BY CMS-2020-01-R: HCPCS | Performed by: EMERGENCY MEDICINE

## 2020-06-15 PROCEDURE — 12000000 ZZH R&B MED SURG/OB

## 2020-06-15 PROCEDURE — 25000128 H RX IP 250 OP 636: Performed by: EMERGENCY MEDICINE

## 2020-06-15 PROCEDURE — 99204 OFFICE O/P NEW MOD 45 MIN: CPT | Performed by: SURGERY

## 2020-06-15 PROCEDURE — 25000128 H RX IP 250 OP 636: Performed by: INTERNAL MEDICINE

## 2020-06-15 PROCEDURE — 96374 THER/PROPH/DIAG INJ IV PUSH: CPT

## 2020-06-15 PROCEDURE — C9113 INJ PANTOPRAZOLE SODIUM, VIA: HCPCS | Performed by: INTERNAL MEDICINE

## 2020-06-15 PROCEDURE — C9803 HOPD COVID-19 SPEC COLLECT: HCPCS

## 2020-06-15 PROCEDURE — 83690 ASSAY OF LIPASE: CPT | Performed by: EMERGENCY MEDICINE

## 2020-06-15 PROCEDURE — 99207 ZZC NON-BILLABLE SERV PER CHARTING: CPT | Performed by: INTERNAL MEDICINE

## 2020-06-15 PROCEDURE — 25000125 ZZHC RX 250: Performed by: EMERGENCY MEDICINE

## 2020-06-15 PROCEDURE — 85025 COMPLETE CBC W/AUTO DIFF WBC: CPT | Performed by: EMERGENCY MEDICINE

## 2020-06-15 PROCEDURE — 25800030 ZZH RX IP 258 OP 636: Performed by: EMERGENCY MEDICINE

## 2020-06-15 PROCEDURE — 99285 EMERGENCY DEPT VISIT HI MDM: CPT | Mod: 25

## 2020-06-15 PROCEDURE — 80053 COMPREHEN METABOLIC PANEL: CPT | Performed by: EMERGENCY MEDICINE

## 2020-06-15 PROCEDURE — 25800030 ZZH RX IP 258 OP 636: Performed by: INTERNAL MEDICINE

## 2020-06-15 RX ORDER — ONDANSETRON 2 MG/ML
4 INJECTION INTRAMUSCULAR; INTRAVENOUS EVERY 6 HOURS PRN
Status: DISCONTINUED | OUTPATIENT
Start: 2020-06-15 | End: 2020-06-16 | Stop reason: HOSPADM

## 2020-06-15 RX ORDER — BISACODYL 10 MG
10 SUPPOSITORY, RECTAL RECTAL DAILY PRN
Status: DISCONTINUED | OUTPATIENT
Start: 2020-06-15 | End: 2020-06-16 | Stop reason: HOSPADM

## 2020-06-15 RX ORDER — IOPAMIDOL 755 MG/ML
78 INJECTION, SOLUTION INTRAVASCULAR ONCE
Status: COMPLETED | OUTPATIENT
Start: 2020-06-15 | End: 2020-06-15

## 2020-06-15 RX ORDER — POTASSIUM CHLORIDE 1500 MG/1
20-40 TABLET, EXTENDED RELEASE ORAL
Status: DISCONTINUED | OUTPATIENT
Start: 2020-06-15 | End: 2020-06-16 | Stop reason: HOSPADM

## 2020-06-15 RX ORDER — PROCHLORPERAZINE 25 MG
12.5 SUPPOSITORY, RECTAL RECTAL EVERY 12 HOURS PRN
Status: DISCONTINUED | OUTPATIENT
Start: 2020-06-15 | End: 2020-06-16 | Stop reason: HOSPADM

## 2020-06-15 RX ORDER — NALOXONE HYDROCHLORIDE 0.4 MG/ML
.1-.4 INJECTION, SOLUTION INTRAMUSCULAR; INTRAVENOUS; SUBCUTANEOUS
Status: DISCONTINUED | OUTPATIENT
Start: 2020-06-15 | End: 2020-06-16 | Stop reason: HOSPADM

## 2020-06-15 RX ORDER — ONDANSETRON 2 MG/ML
4 INJECTION INTRAMUSCULAR; INTRAVENOUS EVERY 30 MIN PRN
Status: DISCONTINUED | OUTPATIENT
Start: 2020-06-15 | End: 2020-06-15

## 2020-06-15 RX ORDER — POTASSIUM CHLORIDE 29.8 MG/ML
20 INJECTION INTRAVENOUS
Status: DISCONTINUED | OUTPATIENT
Start: 2020-06-15 | End: 2020-06-16 | Stop reason: HOSPADM

## 2020-06-15 RX ORDER — ONDANSETRON 4 MG/1
4 TABLET, ORALLY DISINTEGRATING ORAL EVERY 6 HOURS PRN
Status: DISCONTINUED | OUTPATIENT
Start: 2020-06-15 | End: 2020-06-16 | Stop reason: HOSPADM

## 2020-06-15 RX ORDER — POTASSIUM CL/LIDO/0.9 % NACL 10MEQ/0.1L
10 INTRAVENOUS SOLUTION, PIGGYBACK (ML) INTRAVENOUS
Status: DISCONTINUED | OUTPATIENT
Start: 2020-06-15 | End: 2020-06-16 | Stop reason: HOSPADM

## 2020-06-15 RX ORDER — LIDOCAINE 40 MG/G
CREAM TOPICAL
Status: DISCONTINUED | OUTPATIENT
Start: 2020-06-15 | End: 2020-06-16 | Stop reason: HOSPADM

## 2020-06-15 RX ORDER — AMOXICILLIN 250 MG
1 CAPSULE ORAL 2 TIMES DAILY PRN
Status: DISCONTINUED | OUTPATIENT
Start: 2020-06-15 | End: 2020-06-16 | Stop reason: HOSPADM

## 2020-06-15 RX ORDER — POTASSIUM CHLORIDE 1.5 G/1.58G
20-40 POWDER, FOR SOLUTION ORAL
Status: DISCONTINUED | OUTPATIENT
Start: 2020-06-15 | End: 2020-06-16 | Stop reason: HOSPADM

## 2020-06-15 RX ORDER — PROCHLORPERAZINE MALEATE 5 MG
5 TABLET ORAL EVERY 6 HOURS PRN
Status: DISCONTINUED | OUTPATIENT
Start: 2020-06-15 | End: 2020-06-16 | Stop reason: HOSPADM

## 2020-06-15 RX ORDER — POTASSIUM CHLORIDE 7.45 MG/ML
10 INJECTION INTRAVENOUS
Status: DISCONTINUED | OUTPATIENT
Start: 2020-06-15 | End: 2020-06-16 | Stop reason: HOSPADM

## 2020-06-15 RX ORDER — SODIUM CHLORIDE 9 MG/ML
1000 INJECTION, SOLUTION INTRAVENOUS CONTINUOUS
Status: DISCONTINUED | OUTPATIENT
Start: 2020-06-15 | End: 2020-06-15

## 2020-06-15 RX ORDER — HYDROMORPHONE HYDROCHLORIDE 1 MG/ML
0.5 INJECTION, SOLUTION INTRAMUSCULAR; INTRAVENOUS; SUBCUTANEOUS
Status: COMPLETED | OUTPATIENT
Start: 2020-06-15 | End: 2020-06-15

## 2020-06-15 RX ORDER — AMOXICILLIN 250 MG
2 CAPSULE ORAL 2 TIMES DAILY PRN
Status: DISCONTINUED | OUTPATIENT
Start: 2020-06-15 | End: 2020-06-16 | Stop reason: HOSPADM

## 2020-06-15 RX ORDER — HYDROMORPHONE HYDROCHLORIDE 1 MG/ML
.3-.5 INJECTION, SOLUTION INTRAMUSCULAR; INTRAVENOUS; SUBCUTANEOUS
Status: DISCONTINUED | OUTPATIENT
Start: 2020-06-15 | End: 2020-06-16 | Stop reason: HOSPADM

## 2020-06-15 RX ADMIN — PANTOPRAZOLE SODIUM 40 MG: 40 INJECTION, POWDER, FOR SOLUTION INTRAVENOUS at 09:38

## 2020-06-15 RX ADMIN — DEXTROSE AND SODIUM CHLORIDE: 5; 900 INJECTION, SOLUTION INTRAVENOUS at 16:30

## 2020-06-15 RX ADMIN — HYDROMORPHONE HYDROCHLORIDE 0.5 MG: 1 INJECTION, SOLUTION INTRAMUSCULAR; INTRAVENOUS; SUBCUTANEOUS at 05:17

## 2020-06-15 RX ADMIN — SODIUM CHLORIDE 62 ML: 9 INJECTION, SOLUTION INTRAVENOUS at 04:17

## 2020-06-15 RX ADMIN — HYDROMORPHONE HYDROCHLORIDE 0.5 MG: 1 INJECTION, SOLUTION INTRAMUSCULAR; INTRAVENOUS; SUBCUTANEOUS at 03:33

## 2020-06-15 RX ADMIN — DEXTROSE AND SODIUM CHLORIDE: 5; 900 INJECTION, SOLUTION INTRAVENOUS at 07:13

## 2020-06-15 RX ADMIN — ONDANSETRON 4 MG: 2 INJECTION INTRAMUSCULAR; INTRAVENOUS at 04:01

## 2020-06-15 RX ADMIN — IOPAMIDOL 78 ML: 755 INJECTION, SOLUTION INTRAVENOUS at 04:17

## 2020-06-15 RX ADMIN — SODIUM CHLORIDE 1000 ML: 9 INJECTION, SOLUTION INTRAVENOUS at 05:17

## 2020-06-15 RX ADMIN — ONDANSETRON 4 MG: 2 INJECTION INTRAMUSCULAR; INTRAVENOUS at 03:32

## 2020-06-15 RX ADMIN — HYDROMORPHONE HYDROCHLORIDE 0.5 MG: 1 INJECTION, SOLUTION INTRAMUSCULAR; INTRAVENOUS; SUBCUTANEOUS at 04:01

## 2020-06-15 RX ADMIN — SODIUM CHLORIDE 1000 ML: 9 INJECTION, SOLUTION INTRAVENOUS at 03:32

## 2020-06-15 ASSESSMENT — ENCOUNTER SYMPTOMS
FEVER: 0
BLOOD IN STOOL: 0
DYSURIA: 0
SHORTNESS OF BREATH: 0
VOMITING: 1
COUGH: 0
ABDOMINAL PAIN: 1
DIFFICULTY URINATING: 0
APPETITE CHANGE: 1
DIARRHEA: 0

## 2020-06-15 ASSESSMENT — ACTIVITIES OF DAILY LIVING (ADL)
ADLS_ACUITY_SCORE: 11

## 2020-06-15 ASSESSMENT — MIFFLIN-ST. JEOR
SCORE: 1291.58
SCORE: 1292.48

## 2020-06-15 NOTE — PROGRESS NOTES
"Patient arrived from ED around 0645. Patient settled. VSS. Pain did decrease down to a \"3-4\" with dose of IV Dilaudid given in ED. Pain on left lower ABD that is cramping. IVF started @ 100 mL/hr. NPO ex meds and ice chips. No c/o nausea at this time. Patient independent. Surgery consulted. Discharge pending.   "

## 2020-06-15 NOTE — ED PROVIDER NOTES
"  History     Chief Complaint:  Abdominal Pain    HPI   Kaleigh Ziegler is a 65 year old female who presents with abdominal pain. Per the patient, this morning she developed abdominal pain and tried hot packs and gretchen with no relief. She presented to urgent care and her workup showed no elevated WBC and she was diagnosed with diverticulitis and given a 10 day course of Augmentin. However, she began vomiting when she tried to take these. She also endorses appetite loss. She denies diarrhea, blood in her stool, dysuria, and difficulty urinating.    Allergies:  No known drug allergies.     Medications:    Augmentin    Past Medical History:    Vertebral body hemangioma T6    Past Surgical History:    Gyn surgery, unspecified  Tubal ligation    Family History:    No past pertinent family history.    Social History:  Smoking Status: Never Smoker  Smokeless Tobacco: Never Used  Alcohol Use: Negative  Drug Use: Negative  PCP: [facility]  Marital Status:       Review of Systems   Constitutional: Positive for appetite change. Negative for fever.   Respiratory: Negative for cough and shortness of breath.    Cardiovascular: Negative for chest pain.   Gastrointestinal: Positive for abdominal pain and vomiting. Negative for blood in stool and diarrhea.   Genitourinary: Negative for difficulty urinating and dysuria.   All other systems reviewed and are negative.         Physical Exam     Patient Vitals for the past 24 hrs:   BP Temp Temp src Pulse Heart Rate Resp SpO2 Height Weight   06/15/20 0400 124/73 -- -- 62 -- -- 97 % -- --   06/15/20 0254 116/41 98.1  F (36.7  C) Temporal -- 71 18 98 % 1.727 m (5' 8\") 70.3 kg (155 lb)       Physical Exam  General: Appears well-developed and well-nourished.   Head: No signs of trauma.   CV: Normal rate and regular rhythm.    Resp: Effort normal and breath sounds normal. No respiratory distress.   GI: Soft. There is LLQ tenderness.  No rebound or guarding.  Normal bowel " sounds.  No CVA tenderness.  MSK: Normal range of motion. no edema. No Calf tenderness.  Neuro: The patient is alert and oriented.  Speech normal.  GCS 15  Skin: Skin is warm and dry. No rash noted.   Psych: normal mood and affect. behavior is normal.         Emergency Department Course     Imaging:  Radiology findings were communicated with the patient who voiced understanding of the findings.    CT Abdomen Pelvis w Contrast  1.  Findings compatible with mechanical small bowel obstruction as detailed above. Transition appears to be in the mid to distal small bowel within the central pelvis. However, definite obstructing etiology is unclear on this study. Potential causes   could include adhesions, stricture, or internal hernia. Clinical correlation. No significant bowel wall thickening appreciated and no evidence for free air.  2.  Cholelithiasis. No biliary dilatation.  Reading per radiology.    Laboratory:  Laboratory findings were communicated with the patient who voiced understanding of the findings.    CBC: WBC 8.1, HGB 16.2(H),   CMP: Glucose 131(H) o/w WNL (Creatinine 0.65)  Lipase: 116    COVID-19 by PCR: In process    Interventions:  0332: Normal Saline, 1 liter, IV bolus   0332: Zofran, 4 mg, IV  0333: Dilaudid, 0.5 mg, IV  0401: Zofran, 4 mg, IV  0401: Dilaudid, 0.5 mg, IV    Emergency Department Course:    0310 Nursing notes and vitals reviewed.    0318 I performed an exam of the patient as documented above.     0323 IV was inserted and blood was drawn for laboratory testing, results above.    0408 The patient was sent for a CT while in the emergency department, results above.     0508 I spoke with Dr. Sloan of the hospitalist service from [facility] regarding patient's presentation, findings, and plan of care.    0509 Patient swabbed for COVID-19 testing.     Findings and plan explained to the patient who consents to admission. Discussed the patient with Dr. Sloan, who will admit  the patient to a med bed for further monitoring, evaluation, and treatment.    Impression & Plan      Medical Decision Making:  Kaleigh Ziegler is a 65 year old female who presents to the emergency department today for evaluation of vomiting and abdominal pain.  She developed left lower quadrant abdominal pain yesterday and had gone to urgent care.  Clinically they had concerns for diverticulitis and the patient had been started on Augmentin.  Unfortunately she was unable to keep this down given repeated vomiting so she came to the ER.  On my evaluation she did have some mild tenderness to the left lower quadrant but there is no peritoneal findings.  Blood work was obtained that was reassuring.  Did obtain a CT scan which did not show diverticulitis, but did show a small bowel obstruction.  This would certainly fit with the patient's overall presentation.  Patient will be admitted to the hospitalist service for continued hydration and monitoring.    Diagnosis:    ICD-10-CM    1. Small bowel obstruction (H)  K56.609      Disposition:   Patient was admitted to a med bed.    Scribe Disclosure:  Edy DIAZ, am serving as a scribe at 3:13 AM on 6/15/2020 to document services personally performed by Saul Mijares MD based on my observations and the provider's statements to me.     EMERGENCY DEPARTMENT       Saul Mijares MD  06/15/20 [id number]

## 2020-06-15 NOTE — PHARMACY-ADMISSION MEDICATION HISTORY
Pharmacy Medication History  Admission medication history interview status for the 6/15/2020  admission is complete. See EPIC admission navigator for prior to admission medications     Medication history sources: Patient  Medication history source reliability: Good  Adherence assessment: N/A    Medication reconciliation completed by provider prior to medication history? Yes    Time spent in this activity: 10 minutes      Prior to Admission medications    Medication Sig Last Dose Taking? Auth Provider   amoxicillin-clavulanate (AUGMENTIN) 875-125 MG tablet Take 1 tablet by mouth 2 times daily for 10 days unable to keep down  Stacie Rushing PA-C

## 2020-06-15 NOTE — TELEPHONE ENCOUNTER
"  Kaleigh was in urgent care today for diverticulitis.  She was prescribed augmentin. She threw up after taking as she is on a liquid diet mainly.  She wondered if she could take another pill now.  Per protocol she can not take another pill unless she saw the pill in her vomit and she did not.  She will take another pill before bed.  Discussed the liquid diet and will do a full liquid diet to rest the stomach but also then can have more in her stomach before she takes both tylenol and augmentin. She agrees to this plan.    Reason for Disposition    Vomiting a prescription medication     Just using this for the care advice about throwing up medication and what to do.    Additional Information    Negative: Shock suspected (e.g., cold/pale/clammy skin, too weak to stand, low BP, rapid pulse)    Negative: Difficult to awaken or acting confused (e.g., disoriented, slurred speech)    Negative: Sounds like a life-threatening emergency to the triager    Negative: Vomiting occurs only while coughing    Negative: [1] Pregnant < 20 Weeks AND [2] nausea/vomiting began in early pregnancy (i.e., 4-8 weeks pregnant)    Negative: Chest pain    Negative: Headache is main symptom    Negative: Vomiting (or Nausea) in a cancer patient who is currently (or recently) receiving chemotherapy or radiation therapy, or cancer patient who has metastatic or end-stage cancer and is receiving palliative care    Negative: [1] Vomiting AND [2] contains red blood or black (\"coffee ground\") material  (Exception: few red streaks in vomit that only happened once)    Negative: Severe pain in one eye    Negative: Recent head injury (within last 3 days)    Negative: Recent abdominal injury (within last 3 days)    Negative: [1] Insulin-dependent diabetes (Type I) AND [2] glucose > 400 mg/dl (22 mmol/l)    Negative: [1] SEVERE vomiting (e.g., 6 or more times/day) AND [2] present > 8 hours    Negative: [1] MODERATE vomiting (e.g., 3 - 5 times/day) AND [2] age " > 60    Negative: Severe headache (e.g., excruciating) (Exception: similar to previous migraines)    Negative: High-risk adult (e.g., diabetes mellitus, brain tumor, V-P shunt, hernia)    Negative: [1] Drinking very little AND [2] dehydration suspected (e.g., no urine > 12 hours, very dry mouth, very lightheaded)    Negative: Patient sounds very sick or weak to the triager    Negative: [1] MILD or MODERATE vomiting AND [2] present > 48 hours (2 days) (Exception: mild vomiting with associated diarrhea)    Negative: Fever present > 3 days (72 hours)    Protocols used: VOMITING-A-AH

## 2020-06-15 NOTE — CONSULTS
General Surgery Consultation  We are asked by Dr. Selvin Sloan to see Kaliegh Ziegler in consultation regarding small bowel obstruction.    Kaleigh Ziegler  YOB: 1955    Age: 65 year old  MRN#: [id number]    Date of Admission: 6/15/2020  Surgeon:   Mohan Allan MD                  Chief Complaint:   Abdominal pain         History of Present Illness:   This patient is a 65 year old female who presented to the [facility] ED early this morning for evaluation of worsening abdominal pain, nausea, and vomiting. Kaleigh had sudden onset of lower left-sided abdominal pain Sunday morning. She was evaluated in urgent care, no imaging performed, was diagnosed with diverticulitis and prescribed Augmentin. She has never had pain like this, diverticulitis, or a small bowel obstruction in the past. She continued to have abdominal pain throughout yesterday, with worsening nausea and onset of vomiting. She then presented to the [facility] ED where CT was performed which revealed small bowel obstruction. No evidence of diverticulitis. Her last BM was around 0900 Sunday which was soft and normal. Denies fever, chills, diarrhea, hematochezia, melena. Her surgical history includes laparoscopic tubal ligation over 20 years ago, no other abdominal surgeries. No personal history of anesthesia reaction.  History is obtained from the patient and chart. Last ate yesterday.         Past Medical History:    has a past medical history of Back pain (1/1/2018), H/O magnetic resonance imaging of lumbar spine (1/1/2018), H/O mitral valve prolapse, Hepatic lesion (1/3/2018), and Vertebral body hemangioma T6 (1/3/2018).          Past Surgical History:     Past Surgical History:   Procedure Laterality Date     GYN SURGERY       LAPAROSCOPIC TUBAL LIGATION              Medications:     Prior to Admission medications    Medication Sig Start Date End Date Taking? Authorizing Provider   amoxicillin-clavulanate (AUGMENTIN)  "875-125 MG tablet Take 1 tablet by mouth 2 times daily for 10 days 6/14/20 6/24/20  RushingStacie alvarez PA-C   Ascorbic Acid (VITAMIN C PO) Take 1 tablet by mouth 2 times daily    Reported, Patient   TONYA OIL PO Take 1 capsule by mouth daily    Reported, Patient   Coenzyme Q10 (CO Q 10 PO) Take 1 tablet by mouth daily    Reported, Patient   cyanocolbalamin (VITAMIN  B-12) 25 mcg TABS quarter-tab Take 25 mcg by mouth daily    Reported, Patient   Fish Oil-Cholecalciferol (FISH OIL + D3 PO) Take 1 tablet by mouth    Reported, Patient   Probiotic Product (PROBIOTIC ADVANCED PO) Take 1 tablet by mouth daily    Reported, Patient   UNABLE TO FIND MEDICATION NAME: Bone strength supplement    Reported, Patient   VITAMIN D, CHOLECALCIFEROL, PO Take 3,000 Units by mouth daily    Reported, Patient   VITAMIN E BLEND PO Take 1 tablet by mouth    Reported, Patient            Allergies:     Allergies   Allergen Reactions     Fish Oil      Kale             Social History:     Social History     Tobacco Use     Smoking status: Never Smoker     Smokeless tobacco: Never Used   Substance Use Topics     Alcohol use: No             Family History:   This patient has no pertinent family history, specifically no known family history of any bleeding, clotting or anesthesia problems.          Review of Systems:   Brief ROS is negative other than noted in the HPI.  Constitutional: NEGATIVE for fever, chills, change in weight  Respiratory: NEGATIVE for significant cough or SOB  Cardiovascular: NEGATIVE for chest pain, palpitations or peripheral edema  Gastrointestinal: POSITIVE for abdominal pain, nausea, vomiting. NEGATIVE for heartburn, diarrhea  Hematologic: NEGATIVE for bleeding problems         Physical Exam:   Blood pressure 108/66, pulse 63, temperature 97.4  F (36.3  C), temperature source Oral, resp. rate 16, height 1.727 m (5' 8\"), weight 70.4 kg (155 lb 3.2 oz), SpO2 97 %, not currently breastfeeding.  I/O last 3 completed shifts:  In: - "   Out: 350 [Urine:350]    General - This is a well developed, well nourished female in no apparent distress.  Head - normocephalic, atraumatic  Eyes - pupils equally round and reactive to light, no scleral icterus, extraocular movements intact  Neck - supple without masses, trachea midline, no lymphadenopathy or thyromegaly  Respiratory: lungs clear to auscultation bilaterally without wheezes, rales or rhonchi   Cardiovascular: regular rate and rhythm; S1 and S2 distinct  Abdomen - mildly distended, soft. Nontender upper abdomen. Mild tenderness across lower abdomen, left > right. No rebound or guarding. No palpable masses or organomegaly. Very small umbilical hernia palpated at previous incision, reducible and nontender.  Extremities - Moves all extremities. Warm without edema. No calf tenderness  Neurologic - Nonfocal          Data:   Labs:  Recent Labs   Lab Test 06/15/20  0327 06/14/20  1734 01/07/18  0843 01/04/18  0715   WBC 8.1 7.0  --  4.5   HGB 16.2* 15.8*  --  11.9   HCT 47.0 46.9  --  36.7    216 231 311     Recent Labs   Lab Test 06/15/20  0327 06/14/20  1734 01/09/18  0647   POTASSIUM 3.5 3.8 4.2   CHLORIDE 103 108 105   CO2 28 28 28   BUN 11 9 13   CR 0.65 0.67 0.47*     Recent Labs   Lab Test 06/15/20  0327 06/14/20  1734 01/01/18  1600   BILITOTAL 1.3 1.0 0.8   ALT 22 20 89*   AST 19 18 45   ALKPHOS 75 69 58   LIPASE 116  --   --      Recent Labs   Lab Test 06/15/20  0327 06/14/20  1734 01/09/18  0647   SUNIL 9.6 8.8 8.3*     Recent Labs   Lab Test 06/15/20  0327 06/14/20  1734 01/09/18  0647 01/03/18  1315 01/01/18  1600  12/23/17  2227   ANIONGAP 6 3 6  --  2*   < >  --    PROTEIN  --   --   --  Negative  --   --  100*   ALBUMIN 4.0 3.7  --   --  3.1*   < >  --     < > = values in this interval not displayed.       CT scan of the abdomen:   FINDINGS:   LOWER CHEST: Subpleural atelectasis in the lung bases.     HEPATOBILIARY: Numerous benign-appearing cysts throughout the liver. The largest is  in the dome of the liver measuring up to 5.8 cm. There are a couple calcified gallstones in the gallbladder. No biliary dilatation.     PANCREAS: Normal.     SPLEEN: Normal.     ADRENAL GLANDS: Normal.     KIDNEYS/BLADDER: There are a couple tiny renal cysts. Kidneys are otherwise negative. No hydronephrosis. Bladder is grossly negative.     BOWEL: There are multiple dilated fluid-filled loops of small bowel with several air-fluid levels present. The distal small bowel is decompressed and findings are compatible with mechanical small bowel obstruction. Transition appears to be in the mid   small bowel within the central pelvis. However, a definite obstructing etiology is unclear. Adhesions, stricture, or internal hernia would all be possibilities. No obvious bowel wall thickening and no evidence for free air. Appendix is normal. Colon is   unremarkable.     LYMPH NODES: Normal.     VASCULATURE: Unremarkable.     PELVIC ORGANS: Normal.     ADDITIONAL FINDINGS: Trace amount of free fluid in the pelvis. Mild nonspecific mesenteric edema.     MUSCULOSKELETAL: Normal.                                                                      IMPRESSION:   1.  Findings compatible with mechanical small bowel obstruction as detailed above. Transition appears to be in the mid to distal small bowel within the central pelvis. However, definite obstructing etiology is unclear on this study. Potential causes   could include adhesions, stricture, or internal hernia. Clinical correlation. No significant bowel wall thickening appreciated and no evidence for free air.     2.  Cholelithiasis. No biliary dilatation.         Assessment:   Kaleigh Ziegler is a 65 year old female with small bowel obstruction         Plan:   - No indication for urgent surgical intervention. Patient already significantly improved since admission, hopefully this will resolve with conservative management. If symptoms worsen or patient develops further emesis,  recommend placing NG tube.  - Continue NPO, IV fluids. Pain meds and anti-emetics available prn.  - Encouraged patient to ambulate, minimize narcotics. PCDs while resting, protonix while NPO  - Await return of bowel function  - No indication for antibiotics  - COVID-19 testing pending       I have discussed the history, physical, and plan with Dr. Allan who will independently interview and examine the patient and update the stated plan as indicated.        YIMI Sahu-C  [facility]  [phone]

## 2020-06-15 NOTE — PROGRESS NOTES
General surgery - chart update    Received page regarding need for surgical prep. No bowel prep needed for surgery. Plan is for surgery 6/16 unless patient has return of bowel function before that time. Continue NPO.      Ruth Myers PA-C  [facility]  [phone]

## 2020-06-15 NOTE — PROGRESS NOTES
Nonbilling note: pt admitted earlier today by Dr Sloan with abdominal pain, N/V, foiund to have SBO; conservative management for now- npo, iv fluids, antiemetics prn, pain management, ambulation, surgery consult appreciated.    Ricarda Galvin MD

## 2020-06-15 NOTE — PLAN OF CARE
"[id number] nursing shift:  Much less LLQ abdominal pain since 0645 arrival to this unit following NPO implementation and IV Dilaudid given x1 by previous shift nurse. No c/o nausea at this timeAbdomen is soft with slight tender w palpation of  LLQ and intermittent dull cramping.  Remains on bowel rest with NPO and  IVF @ 100 mL/hr.  Patient independent. Surgery consulted resulting in no surgical intervention needed at this time. Writer gave pt written education sheets on SBO.   Plan to discharge home when medically stable and able to tolerate diet following current bowel rest period    1445 addendum; Has not passed any flatus yet.  Last BM was last Sunday 6/13/20. Pt reported to writer that surgeon Dr. Allan recently spoke to pt on phone  her telling her (and as indicated in his note) that he's \"going to put her on the schedule presumptively for tomorrow morning for a  laparoscopy possible laparotomy. But If there's better return of bowel function in the next 12 to 18 hours, he would then cancel surgery and continue with conservative treatment\".    "

## 2020-06-15 NOTE — PROGRESS NOTES
RECEIVING UNIT ED HANDOFF REVIEW    ED Nurse Handoff Report was reviewed by: Halle Hanna RN on Jo Ann 15, 2020 at 5:45 AM

## 2020-06-15 NOTE — H&P
[facility]    History and Physical  Hospitalist       Date of Admission:  6/15/2020  Date of Service (when I saw the patient): 06/15/20    Assessment & Plan   Kaleigh Ziegler is a 65 year old female who presents with abdominal pain    Small bowel obstruction  Day prior to presentation with abdominal pain. To  and diagnosed with possible diverticulitis, given Augmentin. However, ongoing vomiting prevented taking meds. No leukocytosis or fever at  or here. LFT's, lipase normal. CT on admission with no noted diverticulitis; however shows mechanical small bowel obstruction. Has h/o tubal ligation otherwise no abdominal surgeries.   - NPO, IV fluids  - pantoprazole 40 mg IV daily  - prn hydromorphone IV for pain  - prn zofran, compazine  - general surgery consult  - nausea ok, hold on NG   - no indication for abx    DVT Prophylaxis: Ambulate every shift  Code Status: Full Code    Disposition: Expected discharge in 2-4 days pending improvement in SBO    Selvin Sloan MD  [phone] (P)  Text Page     Primary Care Physician   Heaven Rose MD    Chief Complaint   Abdominal pain    History is obtained from the patient and medical records    History of Present Illness   Kaleigh Ziegler is a 65 year old female who presents with abdominal pain.  She has a fairly unremarkable past medical history.  She states the morning of presentation she was driving to go kayaking when she had the onset of abdominal pain.  She states the pain was mostly in her left lower stomach as well as some radiation to the back.  It persisted so she went to an urgent care.  At that time they did not have imaging but given her symptoms I thought it might be diverticulitis.  They gave her a prescription for Augmentin.  She states that she tried to take this but could not keep it down and had nausea and vomiting.  Her nausea vomiting persisted with p.o. prompting her presentation to the emergency department.  She  is never had a history of small bowel obstructions in the past.  She does have a history of tubal ligation and then did it laparoscopically through her umbilicus.  She denies any fevers or chills.  She has no chest pain or shortness of breath.  She last passed a bowel movement this morning.  Denies any urinary symptoms.  She currently is not having any nausea or vomiting but does have persistent pain.    Past Medical History    I have reviewed this patient's medical history and updated it with pertinent information if needed.   Past Medical History:   Diagnosis Date     Back pain 1/1/2018     H/O magnetic resonance imaging of lumbar spine 1/1/2018    MRI LUMBAR SPINE WITHOUT AND WITH CONTRAST 12/24/2017 12:02 AM    HISTORY: Bilateral leg pain and back pain. Fever. Rule out spine infection. Pain started months ago, but is worse today.   TECHNIQUE: Multiplanar multisequence MRI of the lumbar spine without and with 7 mL Gadavist.   COMPARISON: None.   FINDINGS: The report is dictated assuming five lumbar-type vertebral bodies, and radiographic co     H/O mitral valve prolapse      Hepatic lesion 1/3/2018    Impression:   1. No definite abnormality of the thoracic spinal cord or abnormal enhancement.  2. No significant spinal canal or neural foraminal stenosis at any level, with minimal multilevel thoracic degenerative disease. 3. Multilevel atypical-appearing benign vertebral hemangiomas, most prominent being involving most of the anterior segment of the T6 vertebral body. No follow-up required of th     Vertebral body hemangioma T6 1/3/2018    Impression:   1. No definite abnormality of the thoracic spinal cord or abnormal enhancement.  2. No significant spinal canal or neural foraminal stenosis at any level, with minimal multilevel thoracic degenerative disease. 3. Multilevel atypical-appearing benign vertebral hemangiomas, most prominent being involving most of the anterior segment of the T6 vertebral body. No  follow-up required of th       Past Surgical History   I have reviewed this patient's surgical history and updated it with pertinent information if needed.  Past Surgical History:   Procedure Laterality Date     GYN SURGERY       LAPAROSCOPIC TUBAL LIGATION         Prior to Admission Medications   Prior to Admission Medications   Prescriptions Last Dose Informant Patient Reported? Taking?   Ascorbic Acid (VITAMIN C PO)   Yes No   Sig: Take 1 tablet by mouth 2 times daily   TONYA OIL PO  Self Yes No   Sig: Take 1 capsule by mouth daily   Coenzyme Q10 (CO Q 10 PO)   Yes No   Sig: Take 1 tablet by mouth daily   Fish Oil-Cholecalciferol (FISH OIL + D3 PO)   Yes No   Sig: Take 1 tablet by mouth   Probiotic Product (PROBIOTIC ADVANCED PO)   Yes No   Sig: Take 1 tablet by mouth daily   UNABLE TO FIND   Yes No   Sig: MEDICATION NAME: Bone strength supplement   VITAMIN D, CHOLECALCIFEROL, PO  Self Yes No   Sig: Take 3,000 Units by mouth daily   VITAMIN E BLEND PO   Yes No   Sig: Take 1 tablet by mouth   amoxicillin-clavulanate (AUGMENTIN) 875-125 MG tablet   No No   Sig: Take 1 tablet by mouth 2 times daily for 10 days   cyanocolbalamin (VITAMIN  B-12) 25 mcg TABS quarter-tab  Self Yes No   Sig: Take 25 mcg by mouth daily      Facility-Administered Medications Last Administration Doses Remaining   acetaminophen (TYLENOL) tablet 1,000 mg 6/14/2020  6:43 PM 0        Allergies   Allergies   Allergen Reactions     Fish Oil      Kale        Social History   I have reviewed this patient's social history and updated it with pertinent information if needed. Kaleigh Ziegler  reports that she has never smoked. She has never used smokeless tobacco. She reports that she does not drink alcohol or use drugs.    Family History   I have reviewed this patient's family history and updated it with pertinent information if needed.   Pt denies any relevant past medical history    Review of Systems   The 10 point Review of Systems is negative  other than noted in the HPI or here.     Physical Exam   Temp: 98.1  F (36.7  C) Temp src: Temporal BP: 124/73 Pulse: 62 Heart Rate: 71 Resp: 18 SpO2: 97 %      Vital Signs with Ranges  155 lbs 0 oz    Constitutional: alert, oriented and in no acute distress  Eyes: EOMI, PERRL  HEENT: OP clear  Respiratory: CTA B without w/c  Cardiovascular: RRR without m/r/g  GI: absent bowel sounds. Tender throughout abdomen but mostly in LLQ. No g/r  Lymph/Hematologic: no cervical LAD  Genitourinary: deferred  Skin: no rashes or lesions grossly  Musculoskeletal: no deformities or arthritis  Neurologic: CN II-XII, PARIKH, sensation grossly intact  Psychiatric: mood and affect wnl    Data   Data reviewed today:  I personally reviewed the abdominal CT image(s) showing small bowel obstruction.  Recent Labs   Lab 06/15/20  0327 06/14/20  1734   WBC 8.1 7.0   HGB 16.2* 15.8*   MCV 94 95    216    139   POTASSIUM 3.5 3.8   CHLORIDE 103 108   CO2 28 28   BUN 11 9   CR 0.65 0.67   ANIONGAP 6 3   SUNIL 9.6 8.8   * 105*   ALBUMIN 4.0 3.7   PROTTOTAL 8.0 7.6   BILITOTAL 1.3 1.0   ALKPHOS 75 69   ALT 22 20   AST 19 18   LIPASE 116  --        Recent Results (from the past 24 hour(s))   CT Abdomen Pelvis w Contrast    Narrative    EXAM: CT ABDOMEN PELVIS W CONTRAST  LOCATION: [facility]  DATE/TIME: 6/15/2020 4:07 AM    INDICATION: Left lower quadrant pain.  COMPARISON: None.  TECHNIQUE: CT scan of the abdomen and pelvis was performed following injection of IV contrast. Multiplanar reformats were obtained. Dose reduction techniques were used.  CONTRAST: 78 mL Isovue-370.    FINDINGS:   LOWER CHEST: Subpleural atelectasis in the lung bases.    HEPATOBILIARY: Numerous benign-appearing cysts throughout the liver. The largest is in the dome of the liver measuring up to 5.8 cm. There are a couple calcified gallstones in the gallbladder. No biliary dilatation.    PANCREAS: Normal.    SPLEEN: Normal.    ADRENAL GLANDS:  Normal.    KIDNEYS/BLADDER: There are a couple tiny renal cysts. Kidneys are otherwise negative. No hydronephrosis. Bladder is grossly negative.    BOWEL: There are multiple dilated fluid-filled loops of small bowel with several air-fluid levels present. The distal small bowel is decompressed and findings are compatible with mechanical small bowel obstruction. Transition appears to be in the mid   small bowel within the central pelvis. However, a definite obstructing etiology is unclear. Adhesions, stricture, or internal hernia would all be possibilities. No obvious bowel wall thickening and no evidence for free air. Appendix is normal. Colon is   unremarkable.    LYMPH NODES: Normal.    VASCULATURE: Unremarkable.    PELVIC ORGANS: Normal.    ADDITIONAL FINDINGS: Trace amount of free fluid in the pelvis. Mild nonspecific mesenteric edema.    MUSCULOSKELETAL: Normal.      Impression    IMPRESSION:   1.  Findings compatible with mechanical small bowel obstruction as detailed above. Transition appears to be in the mid to distal small bowel within the central pelvis. However, definite obstructing etiology is unclear on this study. Potential causes   could include adhesions, stricture, or internal hernia. Clinical correlation. No significant bowel wall thickening appreciated and no evidence for free air.    2.  Cholelithiasis. No biliary dilatation.

## 2020-06-15 NOTE — ED NOTES
"[facility]  ED Nurse Handoff Report    ED Chief complaint: Abdominal Pain (Abd. pain and vomiting today. DX of diverticulitis earlier today. Unable to keep meds down. )      ED Diagnosis:   Final diagnoses:   None       Code Status: Full Code    Allergies:   Allergies   Allergen Reactions     Fish Oil      Kale        Patient Story: LLQ pain and vomiting  Focused Assessment:  Kaleigh Ziegler is a 65 year old female who presents with abdominal pain. Per the patient, this morning she developed abdominal pain and tried hot packs and gretchen with no relief. She presented to urgent care and her workup showed no elevated WBC and she was diagnosed with diverticulitis and given a 10 day course of Augmentin. However, she began vomiting when she tried to take these. She also endorses appetite loss. She denies diarrhea, blood in her stool, dysuria, and difficulty urinating. She is found to have a bowel obstruction.                                        Treatments and/or interventions provided: pain and nausea meds  Patient's response to treatments and/or interventions: poor pain control, MD aware    To be done/followed up on inpatient unit:  TBD    Does this patient have any cognitive concerns?: alert and oriented x 4    Activity level - Baseline/Home:  Independent  Activity Level - Current:   Stand with Assist    Patient's Preferred language: English   Needed?: No    Isolation: None and Other: asymptomatic covid rule out  Infection: covid test pending  Bariatric?: No    Vital Signs:   Vitals:    06/15/20 0254 06/15/20 0400   BP: 116/41 124/73   Pulse:  62   Resp: 18    Temp: 98.1  F (36.7  C)    TempSrc: Temporal    SpO2: 98% 97%   Weight: 70.3 kg (155 lb)    Height: 1.727 m (5' 8\")        Cardiac Rhythm:     Was the PSS-3 completed:   Yes  What interventions are required if any?               Family Comments:  updated via phone  OBS brochure/video discussed/provided to patient/family: " No              Name of person given brochure if not patient: na              Relationship to patient: na    For the majority of the shift this patient's behavior was Green.   Behavioral interventions performed were na.    ED NURSE PHONE NUMBER: *[phone]

## 2020-06-16 ENCOUNTER — APPOINTMENT (OUTPATIENT)
Dept: SURGERY | Facility: PHYSICIAN GROUP | Age: 65
End: 2020-06-16
Payer: COMMERCIAL

## 2020-06-16 VITALS
HEIGHT: 68 IN | RESPIRATION RATE: 16 BRPM | OXYGEN SATURATION: 97 % | SYSTOLIC BLOOD PRESSURE: 109 MMHG | WEIGHT: 155.2 LBS | DIASTOLIC BLOOD PRESSURE: 65 MMHG | BODY MASS INDEX: 23.52 KG/M2 | TEMPERATURE: 97.6 F | HEART RATE: 60 BPM

## 2020-06-16 LAB
ANION GAP SERPL CALCULATED.3IONS-SCNC: 4 MMOL/L (ref 3–14)
BUN SERPL-MCNC: 7 MG/DL (ref 7–30)
CALCIUM SERPL-MCNC: 8 MG/DL (ref 8.5–10.1)
CHLORIDE SERPL-SCNC: 115 MMOL/L (ref 94–109)
CO2 SERPL-SCNC: 25 MMOL/L (ref 20–32)
CREAT SERPL-MCNC: 0.72 MG/DL (ref 0.52–1.04)
GFR SERPL CREATININE-BSD FRML MDRD: 88 ML/MIN/{1.73_M2}
GLUCOSE SERPL-MCNC: 108 MG/DL (ref 70–99)
POTASSIUM SERPL-SCNC: 3.2 MMOL/L (ref 3.4–5.3)
POTASSIUM SERPL-SCNC: 3.9 MMOL/L (ref 3.4–5.3)
SODIUM SERPL-SCNC: 144 MMOL/L (ref 133–144)

## 2020-06-16 PROCEDURE — 25800030 ZZH RX IP 258 OP 636: Performed by: INTERNAL MEDICINE

## 2020-06-16 PROCEDURE — 36415 COLL VENOUS BLD VENIPUNCTURE: CPT | Performed by: INTERNAL MEDICINE

## 2020-06-16 PROCEDURE — 99238 HOSP IP/OBS DSCHRG MGMT 30/<: CPT | Performed by: INTERNAL MEDICINE

## 2020-06-16 PROCEDURE — 25000132 ZZH RX MED GY IP 250 OP 250 PS 637: Performed by: INTERNAL MEDICINE

## 2020-06-16 PROCEDURE — 25000128 H RX IP 250 OP 636: Performed by: INTERNAL MEDICINE

## 2020-06-16 PROCEDURE — C9113 INJ PANTOPRAZOLE SODIUM, VIA: HCPCS | Performed by: INTERNAL MEDICINE

## 2020-06-16 PROCEDURE — 84132 ASSAY OF SERUM POTASSIUM: CPT | Performed by: INTERNAL MEDICINE

## 2020-06-16 PROCEDURE — 80048 BASIC METABOLIC PNL TOTAL CA: CPT | Performed by: INTERNAL MEDICINE

## 2020-06-16 RX ORDER — SODIUM CHLORIDE, SODIUM LACTATE, POTASSIUM CHLORIDE, CALCIUM CHLORIDE 600; 310; 30; 20 MG/100ML; MG/100ML; MG/100ML; MG/100ML
INJECTION, SOLUTION INTRAVENOUS CONTINUOUS
Status: CANCELLED | OUTPATIENT
Start: 2020-06-16

## 2020-06-16 RX ADMIN — PANTOPRAZOLE SODIUM 40 MG: 40 INJECTION, POWDER, FOR SOLUTION INTRAVENOUS at 08:33

## 2020-06-16 RX ADMIN — POTASSIUM CHLORIDE 40 MEQ: 1.5 POWDER, FOR SOLUTION ORAL at 10:24

## 2020-06-16 RX ADMIN — DEXTROSE AND SODIUM CHLORIDE: 5; 900 INJECTION, SOLUTION INTRAVENOUS at 10:32

## 2020-06-16 RX ADMIN — DEXTROSE AND SODIUM CHLORIDE: 5; 900 INJECTION, SOLUTION INTRAVENOUS at 02:24

## 2020-06-16 RX ADMIN — POTASSIUM CHLORIDE 20 MEQ: 1.5 POWDER, FOR SOLUTION ORAL at 13:03

## 2020-06-16 ASSESSMENT — ACTIVITIES OF DAILY LIVING (ADL)
ADLS_ACUITY_SCORE: 11

## 2020-06-16 NOTE — PLAN OF CARE
Pt A/Ox4, VSS on RA except BP soft. Denies pain and nausea. BS hypoactive, pt did have small formed bowel movement this evening. Passing flatus. BLE neuropathy, baseline per pt. PIV infusing NS @100. Ambulating in werner frequently, independent. Remains NPO, is on schedule for surgery tomorrow am but per surgery may cancel if bowel function improving. Continue to monitor.

## 2020-06-16 NOTE — PROVIDER NOTIFICATION
"0800 Nursing: Return of bowel function last night. Had 2 formed BM episodes and passed good amt flatus. Denies any pain. Abd non-tender to palpation. Writer's assessment is that surgery is no longer needed since signs/sx of mechanical SBO have resolved and bowel function returned. Pt states she feels like a new person and doesn't want surgery\".  Writer obtained verbal order from on-call Surgeon Dr Whiteside to cancel surgery and advance diet     Addendum: Tolerated CL brkfst. Diet advanced to low residue for lunch.   K+ level below parameter at 3.2 Started on oral Potassium (powder form) replacement. Anticipate discharge home if tolerates lunch without GI distress and if MD Ok with current K+ level    1445 Addendum:  Has formed small BM this afternoon   K+ replacement done.Next  K+ lab draw is at 1915. If Potassium is 3.4 or more then OK to discharge today.   "

## 2020-06-16 NOTE — PROGRESS NOTES
Doing well  No further symptoms of obstruction  Passing gas and having bm's  No nausea or pain  Af vss  abd completely benign    A/p  Sbo, improved  Cancel or  Start diet  Possibly home later today if tolerates diet

## 2020-06-16 NOTE — PLAN OF CARE
Patient discharged at 6:23 PM to home.  IV was discontinued. Pain at time of discharge was 0. Belongings returned to patient.  Discharge instructions and medications reviewed with patient.  Patient verbalized understanding and all questions were answered. Pt had no prescriptions.At time of discharge, patient condition was stable and left the unit via wheelchair escorted by transport.

## 2020-06-16 NOTE — PLAN OF CARE
A&Ox4. VSS ex BP soft. No C/O pain or nausea. BS hypoactive, small BM, passing flatus. BLE neuropathy baseline. PIV infusing D5 NS @ 100 mL/hr. Ind, ambulating frequently. NPO ex ice chips & meds. Scheduled for surgery 6/16 @ 0930 but may cancel if bowel function improves.

## 2020-06-16 NOTE — DISCHARGE SUMMARY
[facility]  Hospitalist Discharge Summary      Date of Admission:  6/15/2020  Date of Discharge:  6/16/2020  Discharging Provider: Clare Galvin MD      Discharge Diagnoses   Small bowel obstruction- resolved  Hypokalemia    Follow-ups Needed After Discharge   Follow-up Appointments     Follow-up and recommended labs and tests       Follow up with primary care provider, Heaven Rose MD, within 7   days for hospital follow- up.  No follow up labs or test are needed.             Unresulted Labs Ordered in the Past 30 Days of this Admission     No orders found for last 31 day(s).      None    Discharge Disposition   Discharged to home  Condition at discharge: Good      Hospital Course   Kaleigh Ziegler is a very pleasant 65 year old female with PMH of [place]ain[place]/acute inflammatory demyelinating polyneuropathy in 2018 (with no deficits) admitted for evaluation of abdominal pain, nausea and vomiting; for a detailed HPI- please refer to H&P done by Dr Selvin Sloan on 06/15/2020. She reports that she started having lower abdominal pain, located in LLQ on the morning of the admission; she presented initially o UC where she was diagnosed with suspected diverticulitis and she was prescribed Augmentin; after she returned home- she started vomiting and was not able to keep anything down and she presented to ER; CT abdomen/pelvis showed multiple dilated fluid-filled loops of small bowel with several air-fluid levels present. The distal small bowel is decompressed and findings are compatible with mechanical small bowel obstruction; conservative management initiated with npo, iv fluids, pain management, antiemetics prn; there was no need for NGT since she did not vomit since admission; she was seen by Surgery consultant who also recommended conservative management for now. Eventually- she started feeling better, started passing gas and few BMs; her abdominal pain improved; she was started  on clear liquid diet and was advanced to full liquid- then low fiber diet which she tolerated well without recurrence of abdominal pain, nausea or vomiting. Her K was 3.2 on 06/16 and replaced as per protocol. She was discharged home in good condition.     Consultations This Hospital Stay   SURGERY GENERAL IP CONSULT    Code Status   Full Code    Time Spent on this Encounter   I, Clare Galvin MD, personally saw the patient today and spent less than or equal to 30 minutes discharging this patient.       Clare Galvin MD  [facility]  ______________________________________________________________________    Physical Exam   Vital Signs: Temp: 97.8  F (36.6  C) Temp src: Oral BP: 105/65   Heart Rate: 74 Resp: 14 SpO2: 97 % O2 Device: None (Room air)    Weight: 155 lbs 3.2 oz     Constitutional: alert, oriented, NAD, very pleasant  HEENT: normocephalic, atraumatic, PERRL, oral mucosa is moist  Respiratory: bilateral air entry, no wheezing, no rales, no crackles   Cardiovascular: S1S2, RRR without m/r/g  GI: abd- soft, nonT, nonD, BS present  Lymph/Hematologic: no cervical LAD  Skin: no rashes or lesions grossly  Musculoskeletal: no deformities or arthritis  Neurologic: CN II-XII, PARIKH, sensation grossly intact  Psychiatric: mood and affect wnl       Primary Care Physician   Heaven Rose MD    Discharge Orders      Reason for your hospital stay    Small bowel obstruction- resolved.     Follow-up and recommended labs and tests     Follow up with primary care provider, Heaven Rose MD, within 7 days for hospital follow- up.  No follow up labs or test are needed.     Activity    Your activity upon discharge: activity as tolerated     When to contact your care team    Call your primary doctor if you have any of the following: temperature greater than 100.5 or less than 96, chills, recurrent abdominal pain, abdominal distention, severe diarrhea or severe constipation, dizziness,  loss of consciousness.     Full Code     Diet    Follow this diet upon discharge: Orders Placed This Encounter      Advance Diet as Tolerated: Low Fiber for the next 2-3 days, then can advance to regular diet.       Significant Results and Procedures   Most Recent 3 CBC's:  Recent Labs   Lab Test 06/15/20  0327 06/14/20  1734 01/07/18  0843 01/04/18  0715   WBC 8.1 7.0  --  4.5   HGB 16.2* 15.8*  --  11.9   MCV 94 95  --  96    216 231 311     Most Recent 3 BMP's:  Recent Labs   Lab Test 06/16/20  0554 06/15/20  0327 06/14/20  1734    137 139   POTASSIUM 3.2* 3.5 3.8   CHLORIDE 115* 103 108   CO2 25 28 28   BUN 7 11 9   CR 0.72 0.65 0.67   ANIONGAP 4 6 3   SUNIL 8.0* 9.6 8.8   * 131* 105*     Most Recent 2 LFT's:  Recent Labs   Lab Test 06/15/20  0327 06/14/20  1734   AST 19 18   ALT 22 20   ALKPHOS 75 69   BILITOTAL 1.3 1.0     Most Recent 6 Bacteria Isolates From Any Culture (See EPIC Reports for Culture Details):  Recent Labs   Lab Test 01/01/18  1730 12/23/17  2134 12/23/17  2108   CULT No growth No growth No growth     Most Recent TSH and T4:No lab results found.  Most Recent Urinalysis:  Recent Labs   Lab Test 01/03/18  1315 12/23/17  2227   COLOR Light Yellow Yellow   APPEARANCE Clear Slightly Cloudy   URINEGLC Negative Negative   URINEBILI Negative Negative   URINEKETONE Negative Negative   SG 1.006 1.024   UBLD Negative Negative   URINEPH 7.0 6.0   PROTEIN Negative 100*   NITRITE Negative Negative   LEUKEST Negative Negative   RBCU  --  2   WBCU  --  4*   ,   Results for orders placed or performed during the hospital encounter of 06/15/20   CT Abdomen Pelvis w Contrast    Narrative    EXAM: CT ABDOMEN PELVIS W CONTRAST  LOCATION: [facility]  DATE/TIME: 6/15/2020 4:07 AM    INDICATION: Left lower quadrant pain.  COMPARISON: None.  TECHNIQUE: CT scan of the abdomen and pelvis was performed following injection of IV contrast. Multiplanar reformats were obtained. Dose reduction  techniques were used.  CONTRAST: 78 mL Isovue-370.    FINDINGS:   LOWER CHEST: Subpleural atelectasis in the lung bases.    HEPATOBILIARY: Numerous benign-appearing cysts throughout the liver. The largest is in the dome of the liver measuring up to 5.8 cm. There are a couple calcified gallstones in the gallbladder. No biliary dilatation.    PANCREAS: Normal.    SPLEEN: Normal.    ADRENAL GLANDS: Normal.    KIDNEYS/BLADDER: There are a couple tiny renal cysts. Kidneys are otherwise negative. No hydronephrosis. Bladder is grossly negative.    BOWEL: There are multiple dilated fluid-filled loops of small bowel with several air-fluid levels present. The distal small bowel is decompressed and findings are compatible with mechanical small bowel obstruction. Transition appears to be in the mid   small bowel within the central pelvis. However, a definite obstructing etiology is unclear. Adhesions, stricture, or internal hernia would all be possibilities. No obvious bowel wall thickening and no evidence for free air. Appendix is normal. Colon is   unremarkable.    LYMPH NODES: Normal.    VASCULATURE: Unremarkable.    PELVIC ORGANS: Normal.    ADDITIONAL FINDINGS: Trace amount of free fluid in the pelvis. Mild nonspecific mesenteric edema.    MUSCULOSKELETAL: Normal.      Impression    IMPRESSION:   1.  Findings compatible with mechanical small bowel obstruction as detailed above. Transition appears to be in the mid to distal small bowel within the central pelvis. However, definite obstructing etiology is unclear on this study. Potential causes   could include adhesions, stricture, or internal hernia. Clinical correlation. No significant bowel wall thickening appreciated and no evidence for free air.    2.  Cholelithiasis. No biliary dilatation.                    Discharge Medications   Current Discharge Medication List      STOP taking these medications       amoxicillin-clavulanate (AUGMENTIN) 875-125 MG tablet Comments:    Reason for Stopping:             Allergies   Allergies   Allergen Reactions     Fish Oil      Kale

## 2020-06-16 NOTE — DISCHARGE INSTRUCTIONS
1. Call your physician if having fever, Temp > 100.4,  Or chills  2. Call MD if abdominal pain not controlled on current pain meds or if develop uncontrolled nausea or if having vomiting.  3..Call MD if having constipation or no BM in 3 days  4. Read and refer to the attached education sheets on small bowel obstruction

## 2020-11-29 ENCOUNTER — HEALTH MAINTENANCE LETTER (OUTPATIENT)
Age: 65
End: 2020-11-29

## 2020-12-23 ENCOUNTER — HOSPITAL ENCOUNTER (OUTPATIENT)
Dept: MAMMOGRAPHY | Facility: CLINIC | Age: 65
Discharge: HOME | End: 2020-12-23
Attending: FAMILY MEDICINE | Admitting: FAMILY MEDICINE
Payer: COMMERCIAL

## 2020-12-23 DIAGNOSIS — Z12.31 VISIT FOR SCREENING MAMMOGRAM: ICD-10-CM

## 2020-12-23 PROCEDURE — 77067 SCR MAMMO BI INCL CAD: CPT

## 2021-01-04 ENCOUNTER — HOSPITAL ENCOUNTER (OUTPATIENT)
Dept: MAMMOGRAPHY | Facility: CLINIC | Age: 66
End: 2021-01-04
Attending: FAMILY MEDICINE
Payer: COMMERCIAL

## 2021-01-04 DIAGNOSIS — Z11.59 ENCOUNTER FOR SCREENING FOR OTHER VIRAL DISEASES: Primary | ICD-10-CM

## 2021-01-04 DIAGNOSIS — R92.8 ABNORMAL MAMMOGRAM: ICD-10-CM

## 2021-01-04 PROCEDURE — G0279 TOMOSYNTHESIS, MAMMO: HCPCS

## 2021-01-04 PROCEDURE — 76642 ULTRASOUND BREAST LIMITED: CPT | Mod: RT

## 2021-01-09 DIAGNOSIS — Z11.59 ENCOUNTER FOR SCREENING FOR OTHER VIRAL DISEASES: ICD-10-CM

## 2021-01-09 LAB
SARS-COV-2 RNA RESP QL NAA+PROBE: NORMAL
SPECIMEN SOURCE: NORMAL

## 2021-01-09 PROCEDURE — U0005 INFEC AGEN DETEC AMPLI PROBE: HCPCS | Performed by: FAMILY MEDICINE

## 2021-01-09 PROCEDURE — U0003 INFECTIOUS AGENT DETECTION BY NUCLEIC ACID (DNA OR RNA); SEVERE ACUTE RESPIRATORY SYNDROME CORONAVIRUS 2 (SARS-COV-2) (CORONAVIRUS DISEASE [COVID-19]), AMPLIFIED PROBE TECHNIQUE, MAKING USE OF HIGH THROUGHPUT TECHNOLOGIES AS DESCRIBED BY CMS-2020-01-R: HCPCS | Performed by: FAMILY MEDICINE

## 2021-01-10 LAB
LABORATORY COMMENT REPORT: NORMAL
SARS-COV-2 RNA RESP QL NAA+PROBE: NEGATIVE
SPECIMEN SOURCE: NORMAL

## 2021-01-12 ENCOUNTER — HOSPITAL ENCOUNTER (OUTPATIENT)
Dept: MAMMOGRAPHY | Facility: CLINIC | Age: 66
End: 2021-01-12
Attending: FAMILY MEDICINE
Payer: COMMERCIAL

## 2021-01-12 DIAGNOSIS — R92.8 ABNORMAL MAMMOGRAM: ICD-10-CM

## 2021-01-12 PROCEDURE — 88377 M/PHMTRC ALYS ISHQUANT/SEMIQ: CPT | Mod: TC | Performed by: PATHOLOGY

## 2021-01-12 PROCEDURE — 88305 TISSUE EXAM BY PATHOLOGIST: CPT | Mod: 26 | Performed by: PATHOLOGY

## 2021-01-12 PROCEDURE — 999N001019 HC STATISTIC H-FISH PROCESS B/S: Performed by: FAMILY MEDICINE

## 2021-01-12 PROCEDURE — 999N000065 MA POST PROCEDURE RIGHT

## 2021-01-12 PROCEDURE — 272N000031 US BREAST BIOPSY CORE NEEDLE, EA ADDL LESION RIGHT

## 2021-01-12 PROCEDURE — 88305 TISSUE EXAM BY PATHOLOGIST: CPT | Mod: TC | Performed by: FAMILY MEDICINE

## 2021-01-12 PROCEDURE — 999N001020 HC STATISTIC H-SEND OUTS PREP: Performed by: FAMILY MEDICINE

## 2021-01-12 PROCEDURE — 272N000031 US BREAST BIOPSY CORE NEEDLE RIGHT

## 2021-01-12 PROCEDURE — 88360 TUMOR IMMUNOHISTOCHEM/MANUAL: CPT | Mod: 26 | Performed by: PATHOLOGY

## 2021-01-12 PROCEDURE — 88342 IMHCHEM/IMCYTCHM 1ST ANTB: CPT | Mod: TC | Performed by: FAMILY MEDICINE

## 2021-01-12 PROCEDURE — 88377 M/PHMTRC ALYS ISHQUANT/SEMIQ: CPT | Mod: 26 | Performed by: MEDICAL GENETICS

## 2021-01-12 PROCEDURE — 88360 TUMOR IMMUNOHISTOCHEM/MANUAL: CPT | Mod: TC | Performed by: FAMILY MEDICINE

## 2021-01-12 PROCEDURE — 250N000009 HC RX 250: Performed by: FAMILY MEDICINE

## 2021-01-12 RX ADMIN — LIDOCAINE HYDROCHLORIDE 5 ML: 10 INJECTION, SOLUTION INFILTRATION; PERINEURAL at 14:10

## 2021-01-12 NOTE — DISCHARGE INSTRUCTIONS

## 2021-01-14 ENCOUNTER — TELEPHONE (OUTPATIENT)
Dept: SURGERY | Facility: CLINIC | Age: 66
End: 2021-01-14

## 2021-01-14 ENCOUNTER — CARE COORDINATION (OUTPATIENT)
Dept: SURGERY | Facility: CLINIC | Age: 66
End: 2021-01-14

## 2021-01-14 LAB
COPATH REPORT: NORMAL
COPATH REPORT: NORMAL

## 2021-01-14 NOTE — PROGRESS NOTES
Appointment details confirmed with Dr. Graff:    Surgical consultation with Dr. Smith on Tuesday, January 19th at 8:45 am.     Dr. Graff will call our office back if date and time does not work for patient.    Antonieta Okeefe RN, BSN, OCN  Breast Nurse Navigator  [facility]  [facility]  Phone: [phone]

## 2021-01-14 NOTE — TELEPHONE ENCOUNTER
Call placed to referring provider's office.    Sophy RN with Dr. Graff, notified pathology results from right breast biopsies performed on 1/12/2021 revealed:    FINAL DIAGNOSIS:   A: Right breast, 12:30, 8.0 cm from nipple, ultrasound-guided core biopsy   - Invasive ductal carcinoma, Geraldine grade 1   - Invasive carcinoma positive for estrogen receptor (>95%) and   progesterone receptor (40%)     B: Right breast, 4:00, 3.0 cm from nipple, ultrasound guided core biopsy   - Invasive lobular carcinoma, Lottie grade 2   - Lobular carcinoma in situ   - Invasive carcinoma positive for estrogen receptor (>90%) and   progesterone receptor (30%)      Per radiologist, Dr. Gilliland, results are concordant with imaging findings.    Recommendation: Surgical Consultation and Breast MRI.    Per Sophy, Dr. Graff will notify Kaleigh of pathology results and recommended follow-up.      Both parties in agreement of above plan.    This telephone encounter and pathology results faxed to Dr. Graff/Sophy.     Antonieta Okeefe RN, BSN, OCN  Breast Nurse Navigator  [facility]  [facility]  Phone: [phone]

## 2021-01-19 ENCOUNTER — OFFICE VISIT (OUTPATIENT)
Dept: SURGERY | Facility: CLINIC | Age: 66
End: 2021-01-19
Payer: COMMERCIAL

## 2021-01-19 VITALS
HEART RATE: 78 BPM | HEIGHT: 68 IN | WEIGHT: 154 LBS | SYSTOLIC BLOOD PRESSURE: 112 MMHG | BODY MASS INDEX: 23.34 KG/M2 | DIASTOLIC BLOOD PRESSURE: 70 MMHG

## 2021-01-19 DIAGNOSIS — C50.311 MALIGNANT NEOPLASM OF LOWER-INNER QUADRANT OF RIGHT BREAST OF FEMALE, ESTROGEN RECEPTOR POSITIVE (H): Primary | ICD-10-CM

## 2021-01-19 DIAGNOSIS — Z17.0 MALIGNANT NEOPLASM OF LOWER-INNER QUADRANT OF RIGHT BREAST OF FEMALE, ESTROGEN RECEPTOR POSITIVE (H): Primary | ICD-10-CM

## 2021-01-19 PROCEDURE — 99417 PROLNG OP E/M EACH 15 MIN: CPT | Performed by: SURGERY

## 2021-01-19 PROCEDURE — 99215 OFFICE O/P EST HI 40 MIN: CPT | Performed by: SURGERY

## 2021-01-19 ASSESSMENT — MIFFLIN-ST. JEOR: SCORE: 1287.04

## 2021-01-19 NOTE — PROGRESS NOTES
[facility] Breast Surgery Consultation    HPI:   Kaleigh Ziegler is a 65 year old female who is seen in consultation at the request of Dr. Graff for evaluation of right breast cancer. Biopsies revealed at 12:30, 8cm FN grade 1 invasive ductal carcinoma ER95% positive, MS 40% positive, HER 2 negative measuring 0.6 and at 4:00, 3cm FN invasive lobular carcinoma, grade 2 ER 90% positive, MS 30% positive measuring 1.7cm. HER 2 negative.     Kaleigh had a screening mammogram on 12/23/2020 which revealed a focal asymmetry in the lower inner right breast. Diagnostic imaging was completed which revealed a persistent irregular mass in the lower inner right breast as well as a small spiculated mass in the upper inner right breast. US revealed a 1.7cm mass at 4:00, 3cm FN and a 0.6cm mass at 12:30, 8cm FN. Axillary ultrasound was normal.     She reports no breast concerns prior to her mammogram. No prior biopsies. No masses. No nipple discharge.         Hormonal history:  menarche 10, no children,  Post menopausal, 6 months OCP use, no HRT, no fertility treatment.     Family history of breast cancer: Yes - mother at 76yo, lumpectomy with RT and hormone blockade  Family history of ovarian cancer:  No  Family history of colon cancer: No  Family history of prostate cancer: No      Past Medical History:   has a past medical history of Back pain (1/1/2018), H/O magnetic resonance imaging of lumbar spine (1/1/2018), H/O mitral valve prolapse, Hepatic lesion (1/3/2018), and Vertebral body hemangioma T6 (1/3/2018).    No current outpatient medications on file.    Past Surgical History:  Past Surgical History:   Procedure Laterality Date     GYN SURGERY       LAPAROSCOPIC TUBAL LIGATION             Allergies   Allergen Reactions     Fish Oil      Kale         Social History:  Social History     Socioeconomic History     Marital status:      Spouse name: Not on file     Number of children: Not on file     Years of  education: Not on file     Highest education level: Not on file   Occupational History     Not on file   Social Needs     Financial resource strain: Not on file     Food insecurity     Worry: Not on file     Inability: Not on file     Transportation needs     Medical: Not on file     Non-medical: Not on file   Tobacco Use     Smoking status: Never Smoker     Smokeless tobacco: Never Used   Substance and Sexual Activity     Alcohol use: No     Drug use: No     Sexual activity: Yes     Partners: Male   Lifestyle     Physical activity     Days per week: Not on file     Minutes per session: Not on file     Stress: Not on file   Relationships     Social connections     Talks on phone: Not on file     Gets together: Not on file     Attends [employer] service: Not on file     Active member of club or organization: Not on file     Attends meetings of clubs or organizations: Not on file     Relationship status: Not on file     Intimate partner violence     Fear of current or ex partner: Not on file     Emotionally abused: Not on file     Physically abused: Not on file     Forced sexual activity: Not on file   Other Topics Concern     Parent/sibling w/ CABG, MI or angioplasty before 65F 55M? No   Social History Narrative    Past Medical History:      History of mitral valve prolapse         Past Surgical History:      GYN surgery    Tubal ligation         Family History:      History reviewed. No pertinent family history.           Social History:    Smoking status: Never Smoker    Alcohol use: No     Marital Status:       Presents with  at bedside.        ROS:  The 10 point review of systems is negative other than noted in the HPI and above.    PE:  Vitals: There were no vitals taken for this visit.  General appearance: well-nourished, sitting comfortably, no apparent distress  Psych: normal affect, pleasant  HEENT:  Head normocephalic and atraumatic, pupils equal and round, conjunctivae clear, mucous membranes  moist, external ears and nose normal  Neck: Supple without thyromegaly or masses  Lungs: Respirations unlabored  Lymphatic: No cervical, or supraclavicular lymphadenopathy  Extremities: Without edema  Musculoskeletal:  Normal station and gait  Neurologic: nonfocal, grossly intact times four extremities, alert and oriented times three  Psychiatric: Mood and affect are appropriate  Skin: Without lesions or rashes    Breast:  A bilateral breast exam was performed in the supine position.. Bilateral breasts were palpated in a circumferential clockwise fashion including the supraclavicular and axillary areas.   Right breast with mild ecchymosis at the 4:00 and 1:00 locations. No palpable masses in either breast. Nipples everted bilaterally. Tissue is heterogeneously dense.     Lymph:       No supraclavicular/infraclavicular adenopathy.   Axillary adenopathy: none    Assessment:  Multifocal right breast cancer - 0.6cm Invasive ductal carcinoma at 12:30, 8cm FN grade 1, ER95% positive, CA 40% positive, HER 2 negative and a a 1.7cm invasive lobular carcinoma at 4:00, 3cm FN grade 2 ER 90% positive, CA 30% positive, HER 2 negative.     Plan:   Kaleigh Ziegler is a 65 year old female has newly diagnosed multifocal right breast cancer.  I reviewed the imaging and pathology reports with her and explained the findings.  We talked about the fact that this is invasive ductal carcinoma and invasive lobular carcinoma  that is 0.6cm and 1.7cm in size and was found on screening mammogram.   We discussed the receptor status of ER/CA positive, HER 2 negative. We discussed that breast cancer is treated in a multidisciplinary fashion and she will also meet with oncology as well as radiation oncology pending surgical decision making and final pathology results. We discussed the role of breast MRI for lobular carcinomas. We will schedule this for her today.     We next discussed the surgical options for treatment.  I described the  procedures for lumpectomy with sentinel lymph node biopsy and mastectomy with sentinel lymph node biopsy, possible axillary node dissection including the details of the procedures, the risks, anesthesia and expected recovery.      I reviewed the data regarding lumpectomy and radiation vs mastectomy that shows that the local recurrence risk is slightly higher for lumpectomy and radiation vs mastectomy (3-5% vs. 1-2%), but the survival at 20 years is the same.  I advised that lymph node biopsy is recommended whenever we are dealing with invasive breast cancer and described the procedure for sentinel lymph node biopsy.  We talked about the risk for lymphedema which is small with removal of only a few nodes, but certainly not zero. I also explained that since this is a  small tumor and was not palpable on clinical breast exam prior to the biopsy, I would ask radiology to place two radioactive seeds pre-operatively for localization.    We talked about post-lumpectomy radiation, the course and usual side effects. We discussed that with lumpectomy, radiation is typically recommended to decrease risk of recurrence. It may be necessary following mastectomy depending on final pathology and if sukhi involvement is present.     We discussed the risk of margin positivity following partial mastectomy surgery and need for possible return to the operating room for additional procedures if margins are positive.     We also talked about post-mastectomy reconstruction and the stages involved. We also discussed the various types of mastectomy, including total, skin-sparing, and nipple-sparing mastectomy.  We reviewed that the nipple-sparing technique is cosmetic; sensation and contractility will likely be lost.  Kaleigh  is a candidate for nipple-sparing mastectomy from an oncologic perspective at this time, MRI would be helpful for further assessment.  The option of having immediate versus delayed reconstruction was also discussed.   We  reviewed that the advantages of immediate reconstruction includes superior cosmetics, as the skin is preserved.      In addition, we have discussed genetic counseling.  She would be a candidate in that situation based on her new diagnosis of breast cancer and family history.  The natural history of BRCA mutations and breast cancer were discussed with the patient. Should a deleterious mutation be identified, she would no longer be a good candidate for breast conservation.  We also reviewed the risk reduction benefits of a prophylactic mastectomy in this situation. She would like to wait on this for now.     We discussed the role of oncotype for hormone positive, HER 2 negative cancers with negative sentinel nodes or 1-3 positive nodes.     Plan:   Breast MRI  Pending MRI - plan for two seed localized right breast lumpectomies with right SLNB    Time spent with the patient with greater that 50% of the time in discussion was 60 minutes.    Shantal Smith MD      Please route or send letter to:  Primary Care Provider (PCP) and Referring Provider

## 2021-01-19 NOTE — NURSING NOTE
Breast Patients    BREAST PATIENTS (ALL)    1-Do you have any of the following symptoms?   2-In which breast are you having the symptoms? right  3-Have you had a Mammogram? Krystin Chavez - Date:  12/23/20  4-Have you ever had a breast cyst drained? No  5-Have you ever had a breast biopsy? Yes, 1/12/21  6-Have you ever had a Breast Cancer? No   7-Is there a history of Breast Cancer in your family? Yes   Relationship to you:    Mother  8-Have you ever had Ovarian Cancer? No  9-Is there a history of Ovarian Cancer in your family? No  10-Summarize your caffeine intake (i.e. coffee, tea, chocolate, soda etc.): None    BREAST PATIENTS (FEMALE)    11-What age did your periods begin? 10  12-Date your last menstrual period began? 44  13-Number of full-term pregnancies: 0  14-Your age when your first child was born? N/A  15-Did you nurse your children? N/A  16-Are you pregnant now? No  17-Have you begun menopause? Yes  Age Menopause began:  Done at 51  18-Have you had either ovary removed?No  19-Do you have breast implants? No   20-Do you use hormone replacement therapy?  No  21-Have you taken oral contraceptive pills?  Yes, For how many years?  6 months   22-Have you had an intrauterine device (IUD) placed?  No  23-What is your current bra size?  36C    Kendra Teague MA

## 2021-01-20 DIAGNOSIS — Z17.0 MALIGNANT NEOPLASM OF LOWER-INNER QUADRANT OF RIGHT BREAST OF FEMALE, ESTROGEN RECEPTOR POSITIVE (H): Primary | ICD-10-CM

## 2021-01-20 DIAGNOSIS — C50.311 MALIGNANT NEOPLASM OF LOWER-INNER QUADRANT OF RIGHT BREAST OF FEMALE, ESTROGEN RECEPTOR POSITIVE (H): Primary | ICD-10-CM

## 2021-01-21 ENCOUNTER — HOSPITAL ENCOUNTER (OUTPATIENT)
Dept: MRI IMAGING | Facility: CLINIC | Age: 66
Discharge: HOME | End: 2021-01-21
Attending: FAMILY MEDICINE | Admitting: FAMILY MEDICINE
Payer: COMMERCIAL

## 2021-01-21 ENCOUNTER — TELEPHONE (OUTPATIENT)
Dept: SURGERY | Facility: PHYSICIAN GROUP | Age: 66
End: 2021-01-21

## 2021-01-21 DIAGNOSIS — C50.911: ICD-10-CM

## 2021-01-21 PROCEDURE — A9585 GADOBUTROL INJECTION: HCPCS | Performed by: FAMILY MEDICINE

## 2021-01-21 PROCEDURE — 255N000002 HC RX 255 OP 636: Performed by: FAMILY MEDICINE

## 2021-01-21 PROCEDURE — 77049 MRI BREAST C-+ W/CAD BI: CPT

## 2021-01-21 RX ORDER — GADOBUTROL 604.72 MG/ML
7 INJECTION INTRAVENOUS ONCE
Status: COMPLETED | OUTPATIENT
Start: 2021-01-21 | End: 2021-01-21

## 2021-01-21 RX ADMIN — GADOBUTROL 7 ML: 604.72 INJECTION INTRAVENOUS at 15:12

## 2021-01-21 NOTE — TELEPHONE ENCOUNTER
Type of surgery: Two seed localized right breast lumpectomy with right sentinel lymph node biopsy  Location of surgery: [facility]  Date and time of surgery: 2/3/21 at 12pm  Surgeon: Dr. Shantal Smith  Pre-Op Appt Date: Patient to schedule  Post-Op Appt Date: Patient to schedule   Packet sent out: Yes  Pre-cert/Authorization completed:  Not Applicable  Date: 1/21/21

## 2021-01-22 ENCOUNTER — TELEPHONE (OUTPATIENT)
Dept: SURGERY | Facility: PHYSICIAN GROUP | Age: 66
End: 2021-01-22

## 2021-01-22 DIAGNOSIS — Z11.59 ENCOUNTER FOR SCREENING FOR OTHER VIRAL DISEASES: Primary | ICD-10-CM

## 2021-01-22 NOTE — TELEPHONE ENCOUNTER
I called Kaleigh and discussed her MRI results. Plan to proceed with surgery as scheduled.     Shantal Smith MD  [facility]  [phone]

## 2021-02-01 DIAGNOSIS — Z11.59 ENCOUNTER FOR SCREENING FOR OTHER VIRAL DISEASES: ICD-10-CM

## 2021-02-01 LAB
LABORATORY COMMENT REPORT: NORMAL
SARS-COV-2 RNA RESP QL NAA+PROBE: NEGATIVE
SARS-COV-2 RNA RESP QL NAA+PROBE: NORMAL
SPECIMEN SOURCE: NORMAL
SPECIMEN SOURCE: NORMAL

## 2021-02-01 PROCEDURE — 99207 PR NO CHARGE LOS: CPT

## 2021-02-01 PROCEDURE — U0003 INFECTIOUS AGENT DETECTION BY NUCLEIC ACID (DNA OR RNA); SEVERE ACUTE RESPIRATORY SYNDROME CORONAVIRUS 2 (SARS-COV-2) (CORONAVIRUS DISEASE [COVID-19]), AMPLIFIED PROBE TECHNIQUE, MAKING USE OF HIGH THROUGHPUT TECHNOLOGIES AS DESCRIBED BY CMS-2020-01-R: HCPCS | Performed by: SURGERY

## 2021-02-01 PROCEDURE — U0005 INFEC AGEN DETEC AMPLI PROBE: HCPCS | Performed by: SURGERY

## 2021-02-02 ENCOUNTER — TELEPHONE (OUTPATIENT)
Dept: SURGERY | Facility: CLINIC | Age: 66
End: 2021-02-02

## 2021-02-02 RX ORDER — UBIDECARENONE 60 MG
CAPSULE ORAL
COMMUNITY

## 2021-02-02 RX ORDER — DIMENHYDRINATE 50 MG
1 TABLET ORAL DAILY
COMMUNITY

## 2021-02-02 RX ORDER — MULTIVITAMIN WITH IRON
1 TABLET ORAL DAILY
COMMUNITY

## 2021-02-02 RX ORDER — MULTIVIT WITH MINERALS/LUTEIN
1000 TABLET ORAL DAILY
COMMUNITY

## 2021-02-02 RX ORDER — ACETAMINOPHEN 500 MG
TABLET ORAL
COMMUNITY

## 2021-02-02 RX ORDER — LACTOBACILLUS RHAMNOSUS GG 10B CELL
1 CAPSULE ORAL 2 TIMES DAILY
COMMUNITY

## 2021-02-02 NOTE — TELEPHONE ENCOUNTER
Kaleigh is scheduled for seed localized right breast lumpectomy x 2, right sentinel lymph node biopsy on 2/3/2021 with Dr. Smith.    Reviewed the following with Kaleigh:     SARS-CoV-2 Virus Specimen Source Nasopharyngeal    SARS-CoV-2 PCR Result NEGATIVE    Comment: SARS-CoV2 (COVID-19) RNA not detected, presumed negative.     -Ok to proceed with surgery as scheduled.   -Surgical patients can have one visitor only during the preoperative phase  -Please wear a mask to your appointment.   -Please check in at [facility] tomorrow at 9:45 am.    Kaleigh verbalized understanding to above.    Antonieta Okeefe RN, BSN, OCN  Breast Nurse Navigator  [facility]  [facility]  Phone: [phone]

## 2021-02-03 ENCOUNTER — HOSPITAL ENCOUNTER (OUTPATIENT)
Dept: MAMMOGRAPHY | Facility: CLINIC | Age: 66
End: 2021-02-03
Attending: SURGERY
Payer: COMMERCIAL

## 2021-02-03 ENCOUNTER — MEDICAL CORRESPONDENCE (OUTPATIENT)
Dept: HEALTH INFORMATION MANAGEMENT | Facility: CLINIC | Age: 66
End: 2021-02-03

## 2021-02-03 ENCOUNTER — ANESTHESIA EVENT (OUTPATIENT)
Dept: SURGERY | Facility: CLINIC | Age: 66
End: 2021-02-03
Payer: COMMERCIAL

## 2021-02-03 ENCOUNTER — ANESTHESIA (OUTPATIENT)
Dept: SURGERY | Facility: CLINIC | Age: 66
End: 2021-02-03
Payer: COMMERCIAL

## 2021-02-03 ENCOUNTER — HOSPITAL ENCOUNTER (OUTPATIENT)
Dept: NUCLEAR MEDICINE | Facility: CLINIC | Age: 66
Setting detail: NUCLEAR MEDICINE
Discharge: HOME | End: 2021-02-03
Attending: SURGERY | Admitting: SURGERY
Payer: COMMERCIAL

## 2021-02-03 ENCOUNTER — HOSPITAL ENCOUNTER (OUTPATIENT)
Facility: CLINIC | Age: 66
Discharge: HOME | End: 2021-02-03
Attending: SURGERY | Admitting: SURGERY
Payer: COMMERCIAL

## 2021-02-03 ENCOUNTER — TRANSFERRED RECORDS (OUTPATIENT)
Dept: HEALTH INFORMATION MANAGEMENT | Facility: CLINIC | Age: 66
End: 2021-02-03

## 2021-02-03 ENCOUNTER — APPOINTMENT (OUTPATIENT)
Dept: SURGERY | Facility: PHYSICIAN GROUP | Age: 66
End: 2021-02-03
Payer: COMMERCIAL

## 2021-02-03 VITALS
SYSTOLIC BLOOD PRESSURE: 119 MMHG | RESPIRATION RATE: 16 BRPM | HEIGHT: 68 IN | DIASTOLIC BLOOD PRESSURE: 71 MMHG | HEART RATE: 68 BPM | WEIGHT: 154 LBS | OXYGEN SATURATION: 95 % | TEMPERATURE: 97.3 F | BODY MASS INDEX: 23.34 KG/M2

## 2021-02-03 DIAGNOSIS — Z17.0 MALIGNANT NEOPLASM OF LOWER-INNER QUADRANT OF RIGHT BREAST OF FEMALE, ESTROGEN RECEPTOR POSITIVE (H): ICD-10-CM

## 2021-02-03 DIAGNOSIS — C50.311 MALIGNANT NEOPLASM OF LOWER-INNER QUADRANT OF RIGHT BREAST OF FEMALE, ESTROGEN RECEPTOR POSITIVE (H): ICD-10-CM

## 2021-02-03 PROCEDURE — 250N000011 HC RX IP 250 OP 636: Performed by: ANESTHESIOLOGY

## 2021-02-03 PROCEDURE — 38792 RA TRACER ID OF SENTINL NODE: CPT

## 2021-02-03 PROCEDURE — 999N000104 MA BREAST SPECIMEN RIGHT OR

## 2021-02-03 PROCEDURE — 88342 IMHCHEM/IMCYTCHM 1ST ANTB: CPT | Mod: 26 | Performed by: PATHOLOGY

## 2021-02-03 PROCEDURE — 343N000001 HC RX 343: Performed by: SURGERY

## 2021-02-03 PROCEDURE — 258N000003 HC RX IP 258 OP 636

## 2021-02-03 PROCEDURE — 88307 TISSUE EXAM BY PATHOLOGIST: CPT | Mod: TC | Performed by: SURGERY

## 2021-02-03 PROCEDURE — 250N000025 HC SEVOFLURANE, PER MIN: Performed by: SURGERY

## 2021-02-03 PROCEDURE — 250N000011 HC RX IP 250 OP 636

## 2021-02-03 PROCEDURE — 88305 TISSUE EXAM BY PATHOLOGIST: CPT | Mod: 26 | Performed by: PATHOLOGY

## 2021-02-03 PROCEDURE — A4648 IMPLANTABLE TISSUE MARKER: HCPCS

## 2021-02-03 PROCEDURE — 88307 TISSUE EXAM BY PATHOLOGIST: CPT | Mod: 26 | Performed by: PATHOLOGY

## 2021-02-03 PROCEDURE — A9520 TC99 TILMANOCEPT DIAG 0.5MCI: HCPCS | Performed by: SURGERY

## 2021-02-03 PROCEDURE — 370N000017 HC ANESTHESIA TECHNICAL FEE, PER MIN: Performed by: SURGERY

## 2021-02-03 PROCEDURE — 250N000009 HC RX 250: Performed by: SURGERY

## 2021-02-03 PROCEDURE — 999N000141 HC STATISTIC PRE-PROCEDURE NURSING ASSESSMENT: Performed by: SURGERY

## 2021-02-03 PROCEDURE — 999N000065 MA POST PROCEDURE RIGHT

## 2021-02-03 PROCEDURE — 272N000001 HC OR GENERAL SUPPLY STERILE: Performed by: SURGERY

## 2021-02-03 PROCEDURE — 88305 TISSUE EXAM BY PATHOLOGIST: CPT | Mod: TC | Performed by: SURGERY

## 2021-02-03 PROCEDURE — 19285 PERQ DEV BREAST 1ST US IMAG: CPT | Mod: RT,XU

## 2021-02-03 PROCEDURE — 250N000013 HC RX MED GY IP 250 OP 250 PS 637: Performed by: PHYSICIAN ASSISTANT

## 2021-02-03 PROCEDURE — 88342 IMHCHEM/IMCYTCHM 1ST ANTB: CPT | Mod: TC,91 | Performed by: SURGERY

## 2021-02-03 PROCEDURE — 250N000011 HC RX IP 250 OP 636: Performed by: SURGERY

## 2021-02-03 PROCEDURE — 88341 IMHCHEM/IMCYTCHM EA ADD ANTB: CPT | Mod: TC | Performed by: SURGERY

## 2021-02-03 PROCEDURE — 710N000009 HC RECOVERY PHASE 1, LEVEL 1, PER MIN: Performed by: SURGERY

## 2021-02-03 PROCEDURE — 360N000083 HC SURGERY LEVEL 3 W/ FLUORO, PER MIN: Performed by: SURGERY

## 2021-02-03 PROCEDURE — 88341 IMHCHEM/IMCYTCHM EA ADD ANTB: CPT | Mod: 26 | Performed by: PATHOLOGY

## 2021-02-03 PROCEDURE — 250N000009 HC RX 250

## 2021-02-03 PROCEDURE — 710N000012 HC RECOVERY PHASE 2, PER MINUTE: Performed by: SURGERY

## 2021-02-03 RX ORDER — HYDROMORPHONE HYDROCHLORIDE 1 MG/ML
.3-.5 INJECTION, SOLUTION INTRAMUSCULAR; INTRAVENOUS; SUBCUTANEOUS EVERY 10 MIN PRN
Status: DISCONTINUED | OUTPATIENT
Start: 2021-02-03 | End: 2021-02-03 | Stop reason: HOSPADM

## 2021-02-03 RX ORDER — ONDANSETRON 2 MG/ML
4 INJECTION INTRAMUSCULAR; INTRAVENOUS EVERY 30 MIN PRN
Status: DISCONTINUED | OUTPATIENT
Start: 2021-02-03 | End: 2021-02-03 | Stop reason: HOSPADM

## 2021-02-03 RX ORDER — HYDROCODONE BITARTRATE AND ACETAMINOPHEN 5; 325 MG/1; MG/1
1-2 TABLET ORAL EVERY 4 HOURS PRN
Qty: 15 TABLET | Refills: 0 | Status: SHIPPED | OUTPATIENT
Start: 2021-02-03

## 2021-02-03 RX ORDER — ONDANSETRON 4 MG/1
4 TABLET, ORALLY DISINTEGRATING ORAL EVERY 30 MIN PRN
Status: DISCONTINUED | OUTPATIENT
Start: 2021-02-03 | End: 2021-02-03 | Stop reason: HOSPADM

## 2021-02-03 RX ORDER — NALOXONE HYDROCHLORIDE 0.4 MG/ML
0.4 INJECTION, SOLUTION INTRAMUSCULAR; INTRAVENOUS; SUBCUTANEOUS
Status: DISCONTINUED | OUTPATIENT
Start: 2021-02-03 | End: 2021-02-03 | Stop reason: HOSPADM

## 2021-02-03 RX ORDER — EPHEDRINE SULFATE 50 MG/ML
INJECTION, SOLUTION INTRAMUSCULAR; INTRAVENOUS; SUBCUTANEOUS PRN
Status: DISCONTINUED | OUTPATIENT
Start: 2021-02-03 | End: 2021-02-03

## 2021-02-03 RX ORDER — PROPOFOL 10 MG/ML
INJECTION, EMULSION INTRAVENOUS CONTINUOUS PRN
Status: DISCONTINUED | OUTPATIENT
Start: 2021-02-03 | End: 2021-02-03

## 2021-02-03 RX ORDER — NALOXONE HYDROCHLORIDE 0.4 MG/ML
0.2 INJECTION, SOLUTION INTRAMUSCULAR; INTRAVENOUS; SUBCUTANEOUS
Status: DISCONTINUED | OUTPATIENT
Start: 2021-02-03 | End: 2021-02-03 | Stop reason: HOSPADM

## 2021-02-03 RX ORDER — FENTANYL CITRATE 50 UG/ML
INJECTION, SOLUTION INTRAMUSCULAR; INTRAVENOUS PRN
Status: DISCONTINUED | OUTPATIENT
Start: 2021-02-03 | End: 2021-02-03

## 2021-02-03 RX ORDER — DEXAMETHASONE SODIUM PHOSPHATE 4 MG/ML
INJECTION, SOLUTION INTRA-ARTICULAR; INTRALESIONAL; INTRAMUSCULAR; INTRAVENOUS; SOFT TISSUE PRN
Status: DISCONTINUED | OUTPATIENT
Start: 2021-02-03 | End: 2021-02-03

## 2021-02-03 RX ORDER — FENTANYL CITRATE 50 UG/ML
25-50 INJECTION, SOLUTION INTRAMUSCULAR; INTRAVENOUS
Status: DISCONTINUED | OUTPATIENT
Start: 2021-02-03 | End: 2021-02-03 | Stop reason: HOSPADM

## 2021-02-03 RX ORDER — SODIUM CHLORIDE, SODIUM LACTATE, POTASSIUM CHLORIDE, CALCIUM CHLORIDE 600; 310; 30; 20 MG/100ML; MG/100ML; MG/100ML; MG/100ML
INJECTION, SOLUTION INTRAVENOUS CONTINUOUS
Status: DISCONTINUED | OUTPATIENT
Start: 2021-02-03 | End: 2021-02-03 | Stop reason: HOSPADM

## 2021-02-03 RX ORDER — AMOXICILLIN 250 MG
1-2 CAPSULE ORAL 2 TIMES DAILY PRN
Qty: 20 TABLET | Refills: 0 | Status: SHIPPED | OUTPATIENT
Start: 2021-02-03

## 2021-02-03 RX ORDER — CEFAZOLIN SODIUM 2 G/100ML
2 INJECTION, SOLUTION INTRAVENOUS
Status: DISCONTINUED | OUTPATIENT
Start: 2021-02-03 | End: 2021-02-03 | Stop reason: HOSPADM

## 2021-02-03 RX ORDER — CEFAZOLIN SODIUM 1 G/3ML
1 INJECTION, POWDER, FOR SOLUTION INTRAMUSCULAR; INTRAVENOUS SEE ADMIN INSTRUCTIONS
Status: DISCONTINUED | OUTPATIENT
Start: 2021-02-03 | End: 2021-02-03 | Stop reason: HOSPADM

## 2021-02-03 RX ORDER — HYDROCODONE BITARTRATE AND ACETAMINOPHEN 5; 325 MG/1; MG/1
1-2 TABLET ORAL
Status: COMPLETED | OUTPATIENT
Start: 2021-02-03 | End: 2021-02-03

## 2021-02-03 RX ORDER — MEPERIDINE HYDROCHLORIDE 25 MG/ML
12.5 INJECTION INTRAMUSCULAR; INTRAVENOUS; SUBCUTANEOUS
Status: DISCONTINUED | OUTPATIENT
Start: 2021-02-03 | End: 2021-02-03 | Stop reason: HOSPADM

## 2021-02-03 RX ORDER — ONDANSETRON 2 MG/ML
INJECTION INTRAMUSCULAR; INTRAVENOUS PRN
Status: DISCONTINUED | OUTPATIENT
Start: 2021-02-03 | End: 2021-02-03

## 2021-02-03 RX ORDER — SODIUM CHLORIDE, SODIUM LACTATE, POTASSIUM CHLORIDE, CALCIUM CHLORIDE 600; 310; 30; 20 MG/100ML; MG/100ML; MG/100ML; MG/100ML
INJECTION, SOLUTION INTRAVENOUS CONTINUOUS PRN
Status: DISCONTINUED | OUTPATIENT
Start: 2021-02-03 | End: 2021-02-03

## 2021-02-03 RX ORDER — FENTANYL CITRATE 50 UG/ML
50 INJECTION, SOLUTION INTRAMUSCULAR; INTRAVENOUS
Status: DISCONTINUED | OUTPATIENT
Start: 2021-02-03 | End: 2021-02-03 | Stop reason: HOSPADM

## 2021-02-03 RX ORDER — LIDOCAINE HYDROCHLORIDE 20 MG/ML
INJECTION, SOLUTION INFILTRATION; PERINEURAL PRN
Status: DISCONTINUED | OUTPATIENT
Start: 2021-02-03 | End: 2021-02-03

## 2021-02-03 RX ORDER — PROPOFOL 10 MG/ML
INJECTION, EMULSION INTRAVENOUS PRN
Status: DISCONTINUED | OUTPATIENT
Start: 2021-02-03 | End: 2021-02-03

## 2021-02-03 RX ADMIN — PHENYLEPHRINE HYDROCHLORIDE 100 MCG: 10 INJECTION INTRAVENOUS at 12:27

## 2021-02-03 RX ADMIN — LIDOCAINE HYDROCHLORIDE 5 ML: 10 INJECTION, SOLUTION INFILTRATION; PERINEURAL at 10:10

## 2021-02-03 RX ADMIN — PHENYLEPHRINE HYDROCHLORIDE 100 MCG: 10 INJECTION INTRAVENOUS at 12:16

## 2021-02-03 RX ADMIN — Medication 5 MG: at 12:30

## 2021-02-03 RX ADMIN — SODIUM CHLORIDE, POTASSIUM CHLORIDE, SODIUM LACTATE AND CALCIUM CHLORIDE: 600; 310; 30; 20 INJECTION, SOLUTION INTRAVENOUS at 12:06

## 2021-02-03 RX ADMIN — TILMANOCEPT 0.57 MILLICURIE: KIT at 11:10

## 2021-02-03 RX ADMIN — FENTANYL CITRATE 50 MCG: 0.05 INJECTION, SOLUTION INTRAMUSCULAR; INTRAVENOUS at 14:21

## 2021-02-03 RX ADMIN — PROPOFOL 40 MG: 10 INJECTION, EMULSION INTRAVENOUS at 13:21

## 2021-02-03 RX ADMIN — FENTANYL CITRATE 50 MCG: 50 INJECTION, SOLUTION INTRAMUSCULAR; INTRAVENOUS at 12:12

## 2021-02-03 RX ADMIN — HYDROCODONE BITARTRATE AND ACETAMINOPHEN 1 TABLET: 5; 325 TABLET ORAL at 14:34

## 2021-02-03 RX ADMIN — CEFAZOLIN 2 G: 1 INJECTION, POWDER, FOR SOLUTION INTRAMUSCULAR; INTRAVENOUS at 12:17

## 2021-02-03 RX ADMIN — MIDAZOLAM 2 MG: 1 INJECTION INTRAMUSCULAR; INTRAVENOUS at 12:06

## 2021-02-03 RX ADMIN — FENTANYL CITRATE 25 MCG: 50 INJECTION, SOLUTION INTRAMUSCULAR; INTRAVENOUS at 12:44

## 2021-02-03 RX ADMIN — PROPOFOL 30 MCG/KG/MIN: 10 INJECTION, EMULSION INTRAVENOUS at 12:16

## 2021-02-03 RX ADMIN — DEXAMETHASONE SODIUM PHOSPHATE 4 MG: 4 INJECTION, SOLUTION INTRA-ARTICULAR; INTRALESIONAL; INTRAMUSCULAR; INTRAVENOUS; SOFT TISSUE at 12:33

## 2021-02-03 RX ADMIN — ONDANSETRON 4 MG: 2 INJECTION INTRAMUSCULAR; INTRAVENOUS at 13:38

## 2021-02-03 RX ADMIN — LIDOCAINE HYDROCHLORIDE 100 MG: 20 INJECTION, SOLUTION INFILTRATION; PERINEURAL at 12:12

## 2021-02-03 RX ADMIN — PROPOFOL 170 MG: 10 INJECTION, EMULSION INTRAVENOUS at 12:12

## 2021-02-03 RX ADMIN — HYDROMORPHONE HYDROCHLORIDE 0.5 MG: 1 INJECTION, SOLUTION INTRAMUSCULAR; INTRAVENOUS; SUBCUTANEOUS at 13:27

## 2021-02-03 RX ADMIN — SODIUM CHLORIDE, POTASSIUM CHLORIDE, SODIUM LACTATE AND CALCIUM CHLORIDE: 600; 310; 30; 20 INJECTION, SOLUTION INTRAVENOUS at 13:41

## 2021-02-03 RX ADMIN — FENTANYL CITRATE 25 MCG: 50 INJECTION, SOLUTION INTRAMUSCULAR; INTRAVENOUS at 13:03

## 2021-02-03 ASSESSMENT — COPD QUESTIONNAIRES: COPD: 0

## 2021-02-03 ASSESSMENT — ENCOUNTER SYMPTOMS
SEIZURES: 0
ORTHOPNEA: 0
DYSRHYTHMIAS: 0

## 2021-02-03 ASSESSMENT — LIFESTYLE VARIABLES: TOBACCO_USE: 0

## 2021-02-03 ASSESSMENT — MIFFLIN-ST. JEOR: SCORE: 1287.04

## 2021-02-03 NOTE — PROGRESS NOTES
SBAR Seed Localization    SITUATION:  Patient to breast imaging center for imaging guided seed localizations before breast lumpectomy or excision biopsy with sentinel node injection.    BACKGROUND:  Breast imaging cancer, breast abnormality  Ordered procedure completed: Yes  Special needs identified: Yes     ASSESSMENT:  SBAR report called to patient care unit because of unexpected event in radiology: No  Allergies and medication list reviewed prior to procedure. Yes  Skin cleansed with ChloraPrep One-Step.  Anesthesia: approximately 10ml of 1% Lidocaine injection subcutaneous before seed insertion administered by the radiologist.   Gauze dressing over insertion site(s).  Post procedure mammogram completed: Yes    Patient tolerance: Patient tolerated procedure well.    RECOMMENDATIONS:  Patient transferred to Same Day Surgery in stable condition via wheelchair with Breast Imaging Staff.    Please call [facility] [phone] if there are any questions.

## 2021-02-03 NOTE — OP NOTE
[facility] Breast Surgery Operative Note      Pre-operative diagnosis: Right breast invasive lobular and invasive ductal carcinomas   Post-operative diagnosis: Same, pathology pending     Procedure: 1.  RIGHT BREAST SEED LOCALIZED PARTIAL MASTECTOMIES x 2   2.  RIGHT SENTINEL LYMPH NODE BIOPSY     Surgeon: Shantal Smith MD   Assistant(s):  Ruth Myers PA-C  The PA s assistance was medically necessary to provide adequate exposure in the operating field, maintain hemostasis, cutting suture, clamping and ligating bleeding vessels, and visualization of anatomic structures throughout the surgical procedure.      Anesthesia: General    Estimated blood loss:   15 cc     Specimens: ID Type Source Tests Collected by Time Destination   A : right breast invasive lobular carcinoma 5 oclock  Tissue Breast, Right SURGICAL PATHOLOGY EXAM Shantal Smith MD 2/3/2021 12:51 PM    B : right breast 5 oclock new posterior margin Tissue Breast, Right SURGICAL PATHOLOGY EXAM Shantal Smith MD 2/3/2021 12:54 PM    C : right breast 5 oclock new superior margin Tissue Breast, Right SURGICAL PATHOLOGY EXAM Shantal Smith MD 2/3/2021 12:54 PM    D : right breast 5 oclock new inferior margin Tissue Breast, Right SURGICAL PATHOLOGY EXAM Shantal Smith MD 2/3/2021 12:54 PM    E : right breast 5 oclock new medial  margin Tissue Breast, Right SURGICAL PATHOLOGY EXAM Shantal Smith MD 2/3/2021 12:54 PM    F : right breast 5 oclock new lateral margin Tissue Breast, Right SURGICAL PATHOLOGY EXAM Shantal Smith MD 2/3/2021 12:55 PM    G : right breast invasive ductal carcinoma 12 oclock  Tissue Breast, Right SURGICAL PATHOLOGY EXAM Shantal Smiht MD 2/3/2021  1:07 PM    H : right axillary sentinel lymph node  Tissue Breast, Right SURGICAL PATHOLOGY EXAM Shantal Smith MD 2/3/2021  1:14 PM    I : right axillary sentinel lymph node #2 Tissue Axilla, Right SURGICAL PATHOLOGY EXAM Shantal Smith MD 2/3/2021  1:17 PM          INDICATION:  Kaleigh is a 65yof who presented for evaluation of multifocal right breast cancer. Biopsies revealed at 12:30, 8cm FN grade 1 invasive ductal carcinoma ER95% positive, KY 40% positive, HER 2 negative measuring 0.6 and at 4:00, 3cm FN invasive lobular carcinoma, grade 2 ER 90% positive, KY 30% positive measuring 1.7cm. HER 2 negative. She had a breast MRI which revealed the same findings. She elected to proceed with two seed localized lumpectomies and sentinel lymph node biopsy  DESCRIPTION OF PROCEDURE: The patient was placed on the table in supine position. General anesthetic was induced. Perioperative antibiotics were given.  The right breast and axilla were prepped and draped in standard sterile fashion.  We used the seed placed in the Breast Center as well as the post-seed mammograms to localize the area of interest. We made a periareolar incision centered at the 4 o'clock position. We created skin flaps along the anterior mammary fascial plane circumferentially using cautery. We carried the incision down using electrocautery into the breast tissue and excised the area of interest, including the seed.  The neoprobe was used to guide the dissection. The area was removed in its entirety with some surrounding benign appearing breast tissue. The posterior margin was including the pectoral fascia. After the specimen was removed it had a high signal with the neoprobe. Once the mass was removed, it was oriented with Arnold dyes. A specimen mammogram was obtained and revealed the clip and seed within the specimen. The margins appeared somewhat close and thus I elected to excise shave margins from the medial, superior, lateral, inferior and posterior margins using cautery. The specimen were then sent to pathology for review.  The wound was then examined for bleeding and hemostasis was achieved using electrocautery.  The field was irrigated with sterile saline.     Clips were placed at the 12, 3, 6, and 9  o'clock positions of the lumpectomy cavity as well as posterior.  The lumpectomy cavity was reapproximated with several interrupted 3-0 vicryl sutures. The skin was closed with a deep dermal 3-0 vicryl and running 4-0 Monocryl subcuticular suture and steri strips.      We then moved to the cancer at 12:30. We again used the seed placed in the Breast Center as well as the post-seed mammograms to localize the area of interest. We made a transverse incision centered at the 12 o'clock position. We created skin flaps along the anterior mammary fascial plane circumferentially using cautery. We carried the incision down using electrocautery into the breast tissue and excised the area of interest, including the seed.  The neoprobe was used to guide the dissection. The area was removed in its entirety with some surrounding benign appearing breast tissue. The posterior margin was including the pectoral fascia. After the specimen was removed it had a high signal with the neoprobe. Once the mass was removed, it was oriented with Arnold dyes. A specimen mammogram was obtained and revealed the seed within the specimen. We had seen the clip within the specimen at the posterior margin and likely ended up on the paints. The margins looked good on imaging.  The specimen was then sent to pathology for review.  The wound was then examined for bleeding and hemostasis was achieved using electrocautery.  The field was irrigated with sterile saline.     Clips were placed at the 12, 3, 6, and 9 o'clock positions of the lumpectomy cavity as well as posterior.  The lumpectomy cavity was reapproximated with several interrupted 3-0 vicryl sutures. The skin was closed with a deep dermal 3-0 vicryl and running 4-0 Monocryl subcuticular suture and steri strips.    We than began the sentinel lymph node biopsy. The patient had been injected with a radionuclear pharmaceutical preoperatively.  We used the Neoprobe to localize an area of increased  activity in the axilla. We made an incision in the skin overlying that area of activity. The incision was carried down into the subcutaneous tissue and into the axillary space with the Bovie electrocautery. We then localized an area of increased activity, and isolated a lymph node from surrounding tissues. The lymph node had a count of 25.  This was passed off the field as right axillary sentinel lymph node #1. There was an additional node deep to this with high counts which was also excised. Ex vivo it had counts of >80.     The remainder of the axilla had no foci of increased radioactivity. There were no clinically positive nodes upon thorough evaluation by palpation of the axilla.   Hemostasis was assured.  We then closed the incision with interrupted subcutaneous 3-0 Vicryl sutures, a running 4-0 Monocryl subcuticular suture and Steri strips. The nodes were sent to pathology for routine evaluation. The patient tolerated the procedure well.  Sponge and instrument counts were correct.    Shantal Smith MD  [facility]  [phone]

## 2021-02-03 NOTE — ANESTHESIA CARE TRANSFER NOTE
Patient: Kaleigh Ziegler    Procedure(s):  right breast two seed localized lumpectomy  right sentinel lymph node biopsy    Diagnosis: Malignant neoplasm of lower-inner quadrant of right breast of female, estrogen receptor positive (H) [C50.311, Z17.0]  Diagnosis Additional Information: No value filed.    Anesthesia Type:   General     Note:    Oropharynx: oropharynx clear of all foreign objects  Level of Consciousness: awake  Oxygen Supplementation: face mask  Level of Supplemental Oxygen (L/min / FiO2): 6  Independent Airway: airway patency satisfactory and stable  Dentition: dentition unchanged  Vital Signs Stable: post-procedure vital signs reviewed and stable  Report to RN Given: handoff report given  Patient transferred to: PACU    Handoff Report: Identifed the Patient, Identified the Reponsible Provider, Reviewed the pertinent medical history, Discussed the surgical course, Reviewed Intra-OP anesthesia mangement and issues during anesthesia, Set expectations for post-procedure period and Allowed opportunity for questions and acknowledgement of understanding      Vitals: (Last set prior to Anesthesia Care Transfer)  CRNA VITALS  2/3/2021 1326 - 2/3/2021 1359      2/3/2021             Pulse:  71    SpO2:  99 %    Resp Rate (observed):  (!) 1    Resp Rate (set):  10        Electronically Signed By: DARY Schaefer CRNA  February 3, 2021  1:59 PM

## 2021-02-03 NOTE — ANESTHESIA PREPROCEDURE EVALUATION
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 Fever. Rule out spine infection. Pain started months ago, but is worse today.   TECHNIQUE: Multiplanar multisequence MRI of the lumbar spine without and with 7 mL Gadavist.   COMPARISON: None.   FINDINGS: The report is dictated assuming five lumbar-type vertebral bodies, and radiographic co     H/O mitral valve prolapse      Hepatic lesion 1/3/2018    Impression:   1. No definite abnormality of the thoracic spinal cord or abnormal enhancement.  2. No significant spinal canal or neural foraminal stenosis at any level, with minimal multilevel thoracic degenerative disease. 3. Multilevel atypical-appearing benign vertebral hemangiomas, most prominent being involving most of the anterior segment of the T6 vertebral body. No follow-up required of      Influenza      Vertebral body hemangioma T6 1/3/2018    Impression:   1. No definite abnormality of the thoracic spinal cord or abnormal enhancement.  2. No significant spinal canal or neural foraminal stenosis at any level, with minimal multilevel thoracic degenerative disease. 3. Multilevel atypical-appearing benign vertebral hemangiomas, most prominent being involving most of the anterior segment of the T6 vertebral body. No follow-up required of       Past Surgical History:   Procedure Laterality Date      GYN SURGERY       LAPAROSCOPIC TUBAL LIGATION        Allergies   Allergen Reactions     Fish Oil      Jose Carlose       Social History     Tobacco Use     Smoking status: Never Smoker     Smokeless tobacco: Never Used   Substance Use Topics     Alcohol use: No      Wt Readings from Last 1 Encounters:   02/03/21 69.9 kg (154 lb)        Anesthesia Evaluation   Pt has had prior anesthetic. Type: General.    No history of anesthetic complications       ROS/MED HX  ENT/Pulmonary:    (-) tobacco use, asthma, COPD, sleep apnea and recent URI   Neurologic: Comment: Hx of Guillan-Ash[place] Syndrome after vaccination - no residual weakness    (+) peripheral neuropathy,  (-) no seizures and no CVA   Cardiovascular:     (+) -----valvular problems/murmurs type: MVP Previous cardiac testing   Echo: Date: Results:    Stress Test: Date: Results:    ECG Reviewed: Date: 1/2021 Results:  NSR, no significant abnormalities   Cath: Date: Results:   (-) angina, hypertension, CAD, CHF, CARCAMO, orthopnea/PND, syncope, arrhythmias, irregular heartbeat/palpitations, angina, murmur and wheezes   METS/Exercise Tolerance: >4 METS    Hematologic:       Musculoskeletal: Comment: T6 Vertebral Body Hemangiona      GI/Hepatic:    (-) GERD and liver disease   Renal/Genitourinary:    (-) renal disease   Endo:    (-) Type II DM, thyroid disease and chronic steroid usage   Psychiatric/Substance Use:    (-) alcohol abuse history   Infectious Disease:       Malignancy:   (+) Malignancy, History of Breast.    Other:            Physical Exam    Airway        Mallampati: II   TM distance: > 3 FB   Neck ROM: full   Mouth opening: > 3 cm    Respiratory Devices and Support         Dental  no notable dental history         Cardiovascular   cardiovascular exam normal       Rhythm and rate: regular and normal (-) no murmur    Pulmonary           breath sounds clear to auscultation   (-) no wheezes        OUTSIDE LABS:  CBC:   Lab Results   Component Value Date    WBC 8.1  06/15/2020    WBC 7.0 06/14/2020    HGB 16.2 (H) 06/15/2020    HGB 15.8 (H) 06/14/2020    HCT 47.0 06/15/2020    HCT 46.9 06/14/2020     06/15/2020     06/14/2020     BMP:   Lab Results   Component Value Date     06/16/2020     06/15/2020    POTASSIUM 3.9 06/16/2020    POTASSIUM 3.2 (L) 06/16/2020    CHLORIDE 115 (H) 06/16/2020    CHLORIDE 103 06/15/2020    CO2 25 06/16/2020    CO2 28 06/15/2020    BUN 7 06/16/2020    BUN 11 06/15/2020    CR 0.72 06/16/2020    CR 0.65 06/15/2020     (H) 06/16/2020     (H) 06/15/2020     HEPATIC:   Lab Results   Component Value Date    ALBUMIN 4.0 06/15/2020    PROTTOTAL 8.0 06/15/2020    ALT 22 06/15/2020    AST 19 06/15/2020    ALKPHOS 75 06/15/2020    BILITOTAL 1.3 06/15/2020     OTHER:   Lab Results   Component Value Date    LACT 1.6 01/03/2018    SUNIL 8.0 (L) 06/16/2020    LIPASE 116 06/15/2020    CRP 13.2 (H) 01/01/2018    SED 4 01/01/2018       Anesthesia Plan    ASA Status:  2   NPO Status:  NPO Appropriate    Anesthesia Type: General     - Airway: LMA   Induction: Intravenous, Propofol   Maintenance: Balanced.        Consents    Anesthesia Plan(s) and associated risks, benefits, and realistic alternatives discussed. Questions answered and patient/representative(s) expressed understanding.     - Discussed with:  Patient         Postoperative Care    Pain management: Multi-modal analgesia.   PONV prophylaxis: Ondansetron (or other 5HT-3), Dexamethasone or Solumedrol, Background Propofol Infusion     Comments:                Donato Iverson MD

## 2021-02-03 NOTE — OR NURSING
Discharge  instructions given to patient and  (via phone).  Discharge paperwork and medications given to patient.

## 2021-02-03 NOTE — DISCHARGE INSTRUCTIONS
Same Day Surgery Discharge Instructions for  Sedation and General Anesthesia       It's not unusual to feel dizzy, light-headed or faint for up to 24 hours after surgery or while taking pain medication.  If you have these symptoms: sit for a few minutes before standing and have someone assist you when you get up to walk or use the bathroom.      You should rest and relax for the next 24 hours. We recommend you make arrangements to have an adult stay with you for at least 24 hours after your discharge.  Avoid hazardous and strenuous activity.      DO NOT DRIVE any vehicle or operate mechanical equipment for 24 hours following the end of your surgery.  Even though you may feel normal, your reactions may be affected by the medication you have received.      Do not drink alcoholic beverages for 24 hours following surgery.       Slowly progress to your regular diet as you feel able. It's not unusual to feel nauseated and/or vomit after receiving anesthesia.  If you develop these symptoms, drink clear liquids (apple juice, ginger ale, broth, 7-up, etc. ) until you feel better.  If your nausea and vomiting persists for 24 hours, please notify your surgeon.        All narcotic pain medications, along with inactivity and anesthesia, can cause constipation. Drinking plenty of liquids and increasing fiber intake will help.      For any questions of a medical nature, call your surgeon.      Do not make important decisions for 24 hours.      If you had general anesthesia, you may have a sore throat for a couple of days related to the breathing tube used during surgery.  You may use Cepacol lozenges to help with this discomfort.  If it worsens or if you develop a fever, contact your surgeon.       If you feel your pain is not well managed with the pain medications prescribed by your surgeon, please contact your surgeon's office to let them know so they can address your concerns.       CoVid 19 Information    We want to give  you information regarding Covid. Please consult your primary care provider with any questions you might have.     Patient who have symptoms (cough, fever, or shortness of breath), need to isolate for 7 days from when symptoms started OR 72 hours after fever resolves (without fever reducing medications) AND improvement of respiratory symptoms (whichever is longer).      Isolate yourself at home (in own room/own bathroom if possible)    Do Not allow any visitors    Do Not go to work or school    Do Not go to [place],  centers, shopping, or other public places.    Do Not shake hands.    Avoid close and intimate contact with others (hugging, kissing).    Follow CDC recommendations for household cleaning of frequently touched services.     After the initial 7 days, continue to isolate yourself from household members as much as possible. To continue decrease the risk of community spread and exposure, you and any members of your household should limit activities in public for 14 days after starting home isolation.     You can reference the following CDC link for helpful home isolation/care tips:  https://www.cdc.gov/coronavirus/2019-ncov/downloads/10Things.pdf    Protect Others:    Cover Your Mouth and Nose with a mask, disposable tissue or wash cloth to avoid spreading germs to others.    Wash your hands and face frequently with soap and water    Call Your Primary Doctor If: Breathing difficulty develops or you become worse.    For more information about COVID19 and options for caring for yourself at home, please visit the CDC website at https://www.cdc.gov/coronavirus/2019-ncov/about/steps-when-sick.html  For more options for care at [facility], please visit our website at [email or web address]          [facility]  Discharge Instructions: Post-Operative Breast Surgery    ACTIVITY    Take frequent short walks and increase your activity gradually.       Avoid strenuous physical activity or heavy lifting greater than 15-20 lbs. for 1-2 weeks with arm on the surgery side.  You may climb stairs.    Gentle rotation and stretching of your arms and shoulders will prevent joint stiffness.    You may drive without restrictions when you are not using any prescription pain medication and feel comfortable in a car.    You may return to work/school when you are comfortable without any prescription pain medication.    WOUND CARE    You may remove your ACE wrap/dressing and shower 48 hours after the surgery.  Pat your incisions dry and leave them open to air.  Re-apply dressing (Band-Aids or gauze/tape) as needed for drainage.    You have steri-strips (looks like white tape) on your incisions.  You may peel off the steri-strips 2 weeks after your surgery if they have not peeled off on their own.    Do not soak your incisions in a tub or pool for 2 weeks.     Do not apply any lotions, creams, or ointments to your incisions.    A ridge under your incisions is normal and will gradually resolve.    Wear a supportive bra for 1-2 weeks, day and night.    DIET    Start with liquids, then gradually resume your regular diet as tolerated.     Drink plenty of liquids to stay hydrated.    PAIN    Expect some tenderness and discomfort at the incision site(s).  Use the prescribed pain medication at your discretion.  Expect gradual resolution of your pain over several days.    You may take ibuprofen with food (unless you have been told not to) instead of or in addition to your prescribed pain medication.  If you are taking Norco, do not take any additional acetaminophen/Tylenol.    Do not drink alcohol or drive while you are taking pain medications.    EXPECTATIONS    Pain medications can cause constipation.  Limit use when possible.  Take over the counter stool softener/stimulant, such as Colace or Senna, 1-2 times a day with plenty of water.  You may take a mild over the counter  laxative, such as Miralax or a suppository, as needed.      You may discontinue these medications once you are having regular bowel movements and/or are no longer taking your narcotic pain medication.    RETURN APPOINTMENT    Follow up with your surgeon (Dr. Shantal Smith) in 2 weeks.  Please call the office at [phone] to schedule your appointment.      CALL OUR OFFICE -[phone] IF YOU HAVE:     Chills or fever above 101 F.    Increased redness, warmth, or drainage at your incisions.    Significant bleeding.    Pain not relieved by your pain medication or rest.    Increasing pain after the first 48 hours.    Any other concerns or questions.       Revised September 2020      **If you have concerns or questions about your procedure,    please contact Dr Smith at  [phone]**

## 2021-02-04 NOTE — ANESTHESIA POSTPROCEDURE EVALUATION
Patient: Kaleigh Ziegler    Procedure(s):  right breast two seed localized lumpectomy  right sentinel lymph node biopsy    Diagnosis:Malignant neoplasm of lower-inner quadrant of right breast of female, estrogen receptor positive (H) [C50.311, Z17.0]  Diagnosis Additional Information: No value filed.    Anesthesia Type:  General    Note:  Disposition: Outpatient   Postop Pain Control: Uneventful            Sign Out: Well controlled pain   PONV: No   Neuro/Psych: Uneventful            Sign Out: Acceptable/Baseline neuro status   Airway/Respiratory: Uneventful            Sign Out: Acceptable/Baseline resp. status   CV/Hemodynamics: Uneventful            Sign Out: Acceptable CV status   Other NRE: NONE   DID A NON-ROUTINE EVENT OCCUR? No    Event details/Postop Comments:  Pt doing well prior to discharge home.           Last vitals:  CRNA VITALS  2/3/2021 1326 - 2/3/2021 1426      2/3/2021             Pulse:  71    SpO2:  99 %    Resp Rate (observed):  (!) 1    Resp Rate (set):  10          Electronically Signed By: Donato Iverson MD  February 3, 2021  9:20 PM

## 2021-02-05 ENCOUNTER — TELEPHONE (OUTPATIENT)
Dept: SURGERY | Facility: CLINIC | Age: 66
End: 2021-02-05

## 2021-02-05 ENCOUNTER — TELEPHONE (OUTPATIENT)
Dept: SURGERY | Facility: PHYSICIAN GROUP | Age: 66
End: 2021-02-05

## 2021-02-05 LAB — COPATH REPORT: NORMAL

## 2021-02-05 NOTE — TELEPHONE ENCOUNTER
I called Kaleigh and discussed her pathology report. It revealed at 5:00 a 2.3cm invasive lobular carcinoma, margins all negative for ILC and DCIS. At the 12:00 location there was a 1.0cm invasive ductal carcinoma. Margins all negative. There was a single sentinel node which was negative. She is doing well. We will order an oncotype on both lesions. She will see me as scheduled for follow up and we will help her schedule with oncology and medical oncology.     Shantal Smith MD  [facility]  [phone]

## 2021-02-05 NOTE — TELEPHONE ENCOUNTER
Kaleigh is POD #2 from seed localized right breast lumpectomy x 2, right sentinel lymph node biopsy with Dr. Smith. Pathology pending. Post procedure call placed to patient.    Kaleigh reports healing well at home. She has minimal pain. Ace wrap in place. Reviewed dressing care with Kaleigh.     Post op appointment scheduled with Dr. Smith on 2/16/21 at 1:30 pm. Referrals to medical and radiation oncology will be initiated once final pathology has been received.    Kaleigh verbalized understanding to above.    Antonieta Okeefe RN, BSN, OCN  Breast Nurse Navigator  [facility]  [facility]  Phone: [phone]

## 2021-02-08 ENCOUNTER — TELEPHONE (OUTPATIENT)
Dept: SURGERY | Facility: CLINIC | Age: 66
End: 2021-02-08

## 2021-02-16 ENCOUNTER — OFFICE VISIT (OUTPATIENT)
Dept: SURGERY | Facility: CLINIC | Age: 66
End: 2021-02-16
Payer: COMMERCIAL

## 2021-02-16 DIAGNOSIS — Z09 SURGERY FOLLOW-UP EXAMINATION: Primary | ICD-10-CM

## 2021-02-16 PROCEDURE — 99024 POSTOP FOLLOW-UP VISIT: CPT | Performed by: SURGERY

## 2021-02-16 NOTE — PROGRESS NOTES
"[facility] Breast Surgery Postoperative Note    S: Kaleigh is doing very well following surgery. She has had very minimal pain.     Breasts: periareolar incision, upper chest incision and axillary incisions all healing well. No erythema or induration. Contour of breast looks good.     Pathology: SPECIMEN(S):   A: Right breast invasive lobular carcinoma, 5 o'clock   B: Right breast 5 o'clock, posterior margin   C: Right breast 5 o'clock, superior margin   D: Right breast 5 o'clock, inferior margin   E: Right breast 5 o'clock, medial margin   F: Right breast 5 o'clock, lateral margin   G: Right breast invasive ductal carcinoma 12 o'clock   H: Right axillary sentinel lymph node   I: Right axillary sentinel lymph node #2     FINAL DIAGNOSIS:   A: Breast, right, \"invasive lobular carcinoma 5:00\", seed localized   lumpectomy:   - Invasive lobular carcinoma, [place] grade 2 (of 3).   - Greatest contiguous dimension of invasive carcinoma: 23 mm (as measured   grossly).   - Ductal carcinoma in situ (DCIS), nuclear grade 2 (of 3), cribriform   pattern.   - Lobular carcinoma in situ (LCIS), classic and pleomorphic types.   - Margins negative for invasive carcinoma (nearest margin on this case   part: posterior, less than 1 mm).   - Margins negative for DCIS (nearest margin on this case part: lateral, 3   mm).   - Margins negative for LCIS (nearest margin on this case part: posterior   and lateral, less than 1 mm).   - Previous biopsy site changes identified.   - Estrogen receptor (ER) positive in invasive carcinoma (per previous   biopsy report).   - Progesterone receptor (RI) positive in invasive carcinoma (per previous   biopsy report).   - HER2 studies on the previous biopsy were separately reported by the   performing laboratory.     B: Breast, right, 5:00 new posterior margin, excision:   - Negative for malignancy.     C: Breast, right, 5:00 new superior margin, excision:   - Negative for invasive carcinoma.   - " "Lobular carcinoma in situ (LCIS), favor classic type, less than 1 mm   from inked tissue surface.     D: Breast, right, 5:00 new inferior margin, excision:   - Negative for malignancy.   - Incidental fibroadenoma.     E: Breast, right, 5:00 new medial margin, excision:   - Negative for invasive carcinoma.   - Lobular carcinoma in situ (LCIS), favor classic type, less than 1 mm   from inked tissue surface.     F: Breast, right, 5:00 new lateral margin, excision:   - Negative for invasive carcinoma.   - Lobular carcinoma in situ (LCIS), favor classic type, present at inked   tissue surface.     G: Breast, right, \"invasive ductal carcinoma 12:00\", seed localized   lumpectomy:   - Invasive ductal carcinoma, [place] grade 1 (of 3).   - Greatest contiguous dimension of invasive carcinoma: 10 mm (as measured   grossly).   - Ductal carcinoma in situ (DCIS), nuclear grade 1 (of 3), cribriform   pattern.   - Margins negative for invasive carcinoma (nearest margin on this case   part: posterior, less than 1 mm).   - Margins negative for in situ carcinoma (nearest margin on this case   part: posterior, 4 mm).   - Previous biopsy site changes identified.   - Estrogen receptor (ER) positive in invasive carcinoma (per previous   biopsy report).   - Progesterone receptor (WA) positive in invasive carcinoma (per previous   biopsy report).   - HER2 studies on the previous biopsy were separately reported by the   performing laboratory.     H: Lymph node, right axillary sentinel, biopsy:   - No lymph node identified.   - Connective tissue negative for malignancy.     I: Lymph node, right axillary sentinel #2, biopsy:   - Negative for malignancy (one lymph node).       A/P  Kaleigh Ziegler is recovering from two right breast lumpectomies with right SLNB on 2/3/2021 for a 2.3cm invasive lobular carcinoma at 5:00 with DCIS and LCIS and margins negative for both invasive and DCIS as well as a 1.0cm invasive ductal carcinoma at 12:00 " with DCIS and LCIS at 12:00, ER/TN positive, HER 2 negative, grade 1. Margins for invasive ductal carcinoma and DCIS are negative and SLNB negative.   Referrals were placed to medical oncology and radiation oncology.  Oncotype was ordered. She will follow up with me in 6 months with a diagnostic right mammogram at that time.     Thank you for the opportunity to help in her care.    Shantal Smith MD  [facility]  [phone]    Please route or send letter to:  Primary Care Provider (PCP) and Referring Provider

## 2021-02-22 ENCOUNTER — TELEPHONE (OUTPATIENT)
Dept: SURGERY | Facility: CLINIC | Age: 66
End: 2021-02-22

## 2021-02-22 NOTE — TELEPHONE ENCOUNTER
Kaleigh notified,    Right Breast (IDC) Oncotype DX Breast Recurrence Score of 8. Absolute chemotherapy benefit <1%.    Right Breast (ILC) Oncotype DX  Breast Recurrence Score of 16. Absolute chemotherapy benefit <1%.    Discuss results and further treatment recommendations in greater detail with medical oncologist. Both parties in agreement of plan.    Reports faxed to Amy Boudreaux and Mariano for continuation of care.     Antonieta Okeefe RN, BSN, OCN  Breast Nurse Navigator  [facility]  Phone: [phone]

## 2021-02-26 ENCOUNTER — DOCUMENTATION ONLY (OUTPATIENT)
Dept: OTHER | Facility: CLINIC | Age: 66
End: 2021-02-26

## 2021-03-05 ENCOUNTER — TRANSFERRED RECORDS (OUTPATIENT)
Dept: HEALTH INFORMATION MANAGEMENT | Facility: CLINIC | Age: 66
End: 2021-03-05

## 2021-04-10 ENCOUNTER — HEALTH MAINTENANCE LETTER (OUTPATIENT)
Age: 66
End: 2021-04-10

## 2021-04-16 ENCOUNTER — TRANSFERRED RECORDS (OUTPATIENT)
Dept: HEALTH INFORMATION MANAGEMENT | Facility: CLINIC | Age: 66
End: 2021-04-16

## 2021-09-19 ENCOUNTER — HEALTH MAINTENANCE LETTER (OUTPATIENT)
Age: 66
End: 2021-09-19

## 2021-10-15 ENCOUNTER — OFFICE VISIT (OUTPATIENT)
Dept: SURGERY | Facility: CLINIC | Age: 66
End: 2021-10-15
Payer: COMMERCIAL

## 2021-10-15 VITALS
HEART RATE: 78 BPM | DIASTOLIC BLOOD PRESSURE: 78 MMHG | WEIGHT: 154 LBS | SYSTOLIC BLOOD PRESSURE: 120 MMHG | RESPIRATION RATE: 16 BRPM | BODY MASS INDEX: 23.34 KG/M2 | OXYGEN SATURATION: 99 % | HEIGHT: 68 IN

## 2021-10-15 DIAGNOSIS — Z98.890 STATUS POST RIGHT BREAST LUMPECTOMY: Primary | ICD-10-CM

## 2021-10-15 PROCEDURE — 99213 OFFICE O/P EST LOW 20 MIN: CPT | Performed by: SURGERY

## 2021-10-15 ASSESSMENT — MIFFLIN-ST. JEOR: SCORE: 1282.04

## 2021-10-25 ENCOUNTER — HOSPITAL ENCOUNTER (OUTPATIENT)
Dept: MAMMOGRAPHY | Facility: CLINIC | Age: 66
End: 2021-10-25
Attending: SURGERY
Payer: COMMERCIAL

## 2021-10-25 DIAGNOSIS — Z98.890 STATUS POST RIGHT BREAST LUMPECTOMY: ICD-10-CM

## 2021-10-25 DIAGNOSIS — N64.89 OTHER SPECIFIED DISORDERS OF BREAST: ICD-10-CM

## 2021-10-25 DIAGNOSIS — N64.9 BREAST LESION: ICD-10-CM

## 2021-10-25 PROCEDURE — 77062 BREAST TOMOSYNTHESIS BI: CPT

## 2021-10-25 PROCEDURE — 76642 ULTRASOUND BREAST LIMITED: CPT | Mod: RT

## 2022-02-04 ENCOUNTER — HOSPITAL ENCOUNTER (OUTPATIENT)
Dept: MAMMOGRAPHY | Facility: CLINIC | Age: 67
Discharge: HOME | End: 2022-02-04
Attending: INTERNAL MEDICINE | Admitting: INTERNAL MEDICINE
Payer: COMMERCIAL

## 2022-02-04 DIAGNOSIS — Z12.31 VISIT FOR SCREENING MAMMOGRAM: ICD-10-CM

## 2022-02-04 DIAGNOSIS — C50.919 BREAST CANCER, FEMALE (H): ICD-10-CM

## 2022-02-04 PROCEDURE — 77067 SCR MAMMO BI INCL CAD: CPT

## 2022-02-16 NOTE — PLAN OF CARE
"Problem: Patient Care Overview  Goal: Plan of Care/Patient Progress Review  Outcome: Therapy, progress toward functional goals as expected  Patient trialed walking up/down a 6\" stairs in therapy gym using a cane per patient request as she anticipates not having a handrail set up on the stairs into her home. Patient is able to walk up/down stairs with CGA, right hand on quad cane, left hand on HR, however unable to get up/down stairs with using the quad cane alone today.      " TINA ELISE ; 02/22/2022 ; [facility]mits  TINA ELISE ; 02/28/2022 ; [facility] [address]  TINA ELISE ; 03/14/2022 ; [facility] [address] TINA ELISE ; 02/22/2022 ; [facility] PreAdmits  TINA ELISE ; 02/28/2022 ; [facility] [address]  TINA ELISE ; 03/14/2022 ; [facility] [address]  TINA ELISE ; 03/15/2022 ; [facility] [address]

## 2022-03-06 ENCOUNTER — HEALTH MAINTENANCE LETTER (OUTPATIENT)
Age: 67
End: 2022-03-06

## 2022-05-19 PROCEDURE — 88305 TISSUE EXAM BY PATHOLOGIST: CPT | Mod: 26 | Performed by: PATHOLOGY

## 2022-05-19 PROCEDURE — 88305 TISSUE EXAM BY PATHOLOGIST: CPT | Mod: TC,ORL | Performed by: COLON & RECTAL SURGERY

## 2022-05-20 ENCOUNTER — LAB REQUISITION (OUTPATIENT)
Dept: LAB | Facility: CLINIC | Age: 67
End: 2022-05-20
Payer: COMMERCIAL

## 2022-05-23 LAB
PATH REPORT.COMMENTS IMP SPEC: NORMAL
PATH REPORT.COMMENTS IMP SPEC: NORMAL
PATH REPORT.FINAL DX SPEC: NORMAL
PATH REPORT.GROSS SPEC: NORMAL
PATH REPORT.MICROSCOPIC SPEC OTHER STN: NORMAL
PATH REPORT.RELEVANT HX SPEC: NORMAL
PHOTO IMAGE: NORMAL

## 2022-09-01 ENCOUNTER — TRANSFERRED RECORDS (OUTPATIENT)
Dept: HEALTH INFORMATION MANAGEMENT | Facility: CLINIC | Age: 67
End: 2022-09-01

## 2022-11-21 ENCOUNTER — HEALTH MAINTENANCE LETTER (OUTPATIENT)
Age: 67
End: 2022-11-21

## 2023-01-12 ENCOUNTER — ANCILLARY ORDERS (OUTPATIENT)
Dept: MAMMOGRAPHY | Facility: CLINIC | Age: 68
End: 2023-01-12

## 2023-01-12 DIAGNOSIS — C50.211 MALIGNANT NEOPLASM OF UPPER-INNER QUADRANT OF RIGHT FEMALE BREAST (H): ICD-10-CM

## 2023-01-12 DIAGNOSIS — Z12.31 VISIT FOR SCREENING MAMMOGRAM: ICD-10-CM

## 2023-02-08 ENCOUNTER — HOSPITAL ENCOUNTER (OUTPATIENT)
Dept: MAMMOGRAPHY | Facility: CLINIC | Age: 68
Discharge: HOME | End: 2023-02-08
Attending: INTERNAL MEDICINE | Admitting: INTERNAL MEDICINE
Payer: COMMERCIAL

## 2023-02-08 DIAGNOSIS — Z12.31 VISIT FOR SCREENING MAMMOGRAM: ICD-10-CM

## 2023-02-08 DIAGNOSIS — C50.211 MALIGNANT NEOPLASM OF UPPER-INNER QUADRANT OF RIGHT FEMALE BREAST (H): ICD-10-CM

## 2023-02-08 PROCEDURE — 77067 SCR MAMMO BI INCL CAD: CPT

## 2023-02-16 ENCOUNTER — TRANSFERRED RECORDS (OUTPATIENT)
Dept: HEALTH INFORMATION MANAGEMENT | Facility: CLINIC | Age: 68
End: 2023-02-16

## 2023-06-20 NOTE — PROGRESS NOTES
"  [facility]   Acute Rehabilitation Unit  Daily progress note    interval history  Kaleigh Ziegler was seen sitting up in bed, reports she is doing well has increasing pain every night from midnight to 7 which she finds interferes with sleep, feels gabapentin is helpful wondering about a \"gentle sleep aide\".  Kaleigh's gabapentin was titrated, and melatonin was added to regimen.  Her dilaudid was discontinued as she does not find this effective in pain relief and wants to avoid complications.  She reports desire to start scheduled senokot, states she typically has 4 regular bm per day and now \"haven't had a bm for 14 hours\".  Reports eating and drinking well denies other concerns.   See rounds note by Dr. Gomez for further details.         medications    sennosides  8.6 mg Oral BID     melatonin  1 mg Oral At Bedtime     gabapentin  600 mg Oral 2 times per day     gabapentin  900 mg Oral QPM     Vitamin D3 capsule 2000 units (rice)-PATIENT'S OWN  2,000 Units Oral Daily        acetaminophen, sodium chloride, senna-docusate, polyethylene glycol, traMADol, naloxone     physical exam  /72 (BP Location: Left arm)  Pulse 73  Temp 97.7  F (36.5  C) (Oral)  Resp 16  Ht 1.734 m (5' 8.25\")  Wt 63 kg (138 lb 14.4 oz)  SpO2 95%  BMI 20.97 kg/m2  Gen: alert nad  Pulm: non labored on room air  Abd: soft non tender  Ext: warm dry without edema  Neuro/MSK: alert oriented face symmetric, seen ambulating down werner with walker w/ PT.   Impaired sensation reported as feet to knees, and ulnar distribution in hand extending to elbow    labs  No new labs    Rehabilitation - continue comprehensive acute inpatient rehabilitation program with multidisciplinary approach including therapies, rehab nursing, and physiatry following. See interval history for updates.      assessment and plan    Mrs. 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      AIDP- presented with progressive ble pain and weakness, noted to be areflexic on exam.  CSF with mild lymphocytic pleocytosis, EMG consistent with AIDP.  Received 5 day course of IVIG  - follow up EMG in ~ 2 weeks  - Pain- continue gabapentin titration- increased 600 mg bid, 900 mg qhs, prn tramadol, prn tylenol, try ice.        Case discussed with Dr. Gomez.      Ellen Santos PA-C  Rehab Service  Pager: [phone]    I spent a total of 25 minutes face-to-face or managing the care of Kaleigh Ziegler. Over 50% of my time on the unit was spent counseling the patient and coordinating care. See note for details.        Topical Steroids Applications Pregnancy And Lactation Text: Most topical steroids are considered safe to use during pregnancy and lactation.  Any topical steroid applied to the breast or nipple should be washed off before breastfeeding.

## 2023-08-09 ENCOUNTER — TRANSFERRED RECORDS (OUTPATIENT)
Dept: HEALTH INFORMATION MANAGEMENT | Facility: CLINIC | Age: 68
End: 2023-08-09
Payer: COMMERCIAL

## 2023-08-09 ENCOUNTER — ANCILLARY ORDERS (OUTPATIENT)
Dept: MAMMOGRAPHY | Facility: CLINIC | Age: 68
End: 2023-08-09

## 2023-08-09 DIAGNOSIS — C50.211 MALIGNANT NEOPLASM OF UPPER-INNER QUADRANT OF RIGHT FEMALE BREAST (H): Primary | ICD-10-CM

## 2024-02-09 ENCOUNTER — TRANSFERRED RECORDS (OUTPATIENT)
Dept: HEALTH INFORMATION MANAGEMENT | Facility: CLINIC | Age: 69
End: 2024-02-09
Payer: COMMERCIAL

## 2024-02-09 ENCOUNTER — HOSPITAL ENCOUNTER (OUTPATIENT)
Dept: MAMMOGRAPHY | Facility: CLINIC | Age: 69
Discharge: HOME | End: 2024-02-09
Attending: INTERNAL MEDICINE | Admitting: INTERNAL MEDICINE
Payer: COMMERCIAL

## 2024-02-09 DIAGNOSIS — Z12.31 VISIT FOR SCREENING MAMMOGRAM: ICD-10-CM

## 2024-02-09 PROCEDURE — 77063 BREAST TOMOSYNTHESIS BI: CPT

## 2024-12-03 NOTE — PROGRESS NOTES
[facility] Follow Up Note    CHIEF COMPLAINT:  H/o right breast cancer    HISTORY OF PRESENT ILLNESS:  Kaleigh Ziegler is a 66 year old female who is seen in follow up for right breast cancer.     She underwent two right breast lumpectomies with right SLNB on 2/3/2021 for a 2.3cm invasive lobular carcinoma at 5:00 with DCIS and LCIS and margins negative for both invasive and DCIS as well as a 1.0cm invasive ductal carcinoma at 12:00 with DCIS and LCIS at 12:00, ER/MS positive, HER 2 negative, grade 1. Margins for invasive ductal carcinoma and DCIS are negative and SLNB negative. Oncotype was 8 and 16.     She follows with Dr. Boudreaux with medical oncology. She had adjuvant radiation with Dr. Quintana.     She reports no breast concerns. She has no pain in the breast. She has had an injury to her right leg but it is recovering well. She lost 20lbs since last spring with diet and exercise (How not to DieT).     REVIEW OF SYSTEMS:  Constitutional:  Negative for chills, fatigue, fever and weight change.  Eyes:  Negative for blurred vision, eye pain and photophobia.  ENT:  Negative for hearing problems, ENT pain, congestion, rhinorrhea, epistaxis, hoarseness and dental problems.  Cardiovascular:  Negative for chest pain, palpitations, tachycardia, orthopnea and edema.  Respiratory:  Negative for cough, dyspnea and hemoptysis.  Gastrointestinal:  Negative for abdominal pain, heartburn, constipation, diarrhea and stool changes.  Musculoskeletal:  Negative for arthralgias, back pain and myalgias.  Integumentary/Breast:  See HPI.    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 Fever. Rule out spine infection. Pain started months ago, but is  worse today.   TECHNIQUE: Multiplanar multisequence MRI of the lumbar spine without and with 7 mL Gadavist.   COMPARISON: None.   FINDINGS: The report is dictated assuming five lumbar-type vertebral bodies, and radiographic co     H/O mitral valve prolapse      Hepatic lesion 1/3/2018    Impression:   1. No definite abnormality of the thoracic spinal cord or abnormal enhancement.  2. No significant spinal canal or neural foraminal stenosis at any level, with minimal multilevel thoracic degenerative disease. 3. Multilevel atypical-appearing benign vertebral hemangiomas, most prominent being involving most of the anterior segment of the T6 vertebral body. No follow-up required of      Influenza      Vertebral body hemangioma T6 1/3/2018    Impression:   1. No definite abnormality of the thoracic spinal cord or abnormal enhancement.  2. No significant spinal canal or neural foraminal stenosis at any level, with minimal multilevel thoracic degenerative disease. 3. Multilevel atypical-appearing benign vertebral hemangiomas, most prominent being involving most of the anterior segment of the T6 vertebral body. No follow-up required of        Past Surgical History:   Procedure Laterality Date     BIOPSY NODE SENTINEL Right 2/3/2021    Procedure: right sentinel lymph node biopsy;  Surgeon: Shantal Smith MD;  Location:  OR     GYN SURGERY       LAPAROSCOPIC TUBAL LIGATION       LUMPECTOMY BREAST WITH SEED LOCALIZATION Right 2/3/2021    Procedure: right breast two seed localized lumpectomy;  Surgeon: Shantal Smith MD;  Location:  OR       Family History   Problem Relation Age of Onset     Breast Cancer Mother 70       Social History     Tobacco Use     Smoking status: Never Smoker     Smokeless tobacco: Never Used   Substance Use Topics     Alcohol use: No       Patient Active Problem List   Diagnosis     H/O magnetic resonance imaging of lumbar spine     Varicella     Back pain     Acute inflammatory  "demyelinating polyneuropathy (H)     Hepatic lesion     Vertebral body hemangioma T6     AIDP (acute inflammatory demyelinating polyneuropathy) (H)     Small bowel obstruction (H)     Malignant neoplasm of lower-inner quadrant of right breast of female, estrogen receptor positive (H)     Allergies   Allergen Reactions     Fish Oil      Kale      Current Outpatient Medications   Medication Sig Dispense Refill     Ascorbic Acid (VITAMIN C PO)        Calcium Carbonate-Vit D-Min (CALCIUM 1200) 8250-2768 MG-UNIT CHEW        cholecalciferol (VITAMIN D3) 25 mcg (1000 units) capsule Take 1 capsule by mouth daily       Coenzyme Q10 (CO Q 10) 60 MG CAPS        Cranberry, Vacc oxycoccus, (CRANBERRY EXTRACT) 200 MG CAPS        Cyanocobalamin (VITAMIN B 12 PO)        Flaxseed, Linseed, (FLAX SEED OIL) 1000 MG capsule Take 1 capsule by mouth daily       GARLIC PO        HYDROcodone-acetaminophen (NORCO) 5-325 MG tablet Take 1-2 tablets by mouth every 4 hours as needed for moderate to severe pain 15 tablet 0     lactobacillus rhamnosus, GG, (CULTURELL) capsule Take 1 capsule by mouth 2 times daily       LYSINE PO        Probiotic Product (PROBIOTIC DAILY PO)        Selenium 200 MCG TABS tablet Take 200 mcg by mouth daily       senna-docusate (SENOKOT-S/PERICOLACE) 8.6-50 MG tablet Take 1-2 tablets by mouth 2 times daily as needed for constipation 20 tablet 0     vitamin (B COMPLEX-C) tablet Take 1 tablet by mouth daily       vitamin E (TOCOPHEROL) 1000 units (450 mg) capsule Take 1,000 Units by mouth daily       Zinc 25 MG TABS        Vitals: /78   Pulse 78   Resp 16   Ht 1.727 m (5' 8\")   Wt 69.9 kg (154 lb)   SpO2 99%   BMI 23.42 kg/m    BMI= Body mass index is 23.42 kg/m .    EXAM:  GENERAL: healthy, alert and no distress   BREAST: right breast with mild radiation changes on skin. Well healed lumpectomy scar on the upper breast and in the axilla. There is a ridge of more prominent breast tissue on the right breast " at 4:00, 6cm FN that is more firm and is likely edge of lumpectomy but is not near the scar. No other areas of concern in either breast.   There is no axillary or supraclavicular lymphadenopathy.  CARDIOVASCULAR:  RRR  RESPIRATORY: nonlabored breathing  NECK: Neck supple. No adenopathy. Thyroid symmetric, normal size,, Carotids without bruits.  SKIN: No suspicious lesions or rashes  LYMPH: Normal cervical lymph nodes      ASSESSMENT/PLAN:  Kaleigh Ziegler is a 66yof with h/o right breast multifocal cancer s/p lumpectomy with SLNB. I would recommend a diagnostic mammogram and US on the right breast given the firm ridge at 4:00, 6cm FN. I believe this is likely benign breast tissue and related to her surgery/radiation but it is more firm than I would expect. I would also like to see her in 6 months for follow up exam. She will not need a screening mammogram in February with bilateral mammogram now.           Shantal Smith MD  [facility]  [phone]        Please route or send letter to:  Primary Care Provider (PCP) and Referring Provider     patient

## 2025-02-28 ENCOUNTER — HOSPITAL ENCOUNTER (OUTPATIENT)
Dept: MAMMOGRAPHY | Facility: CLINIC | Age: 70
Discharge: HOME | End: 2025-02-28
Attending: INTERNAL MEDICINE | Admitting: INTERNAL MEDICINE
Payer: COMMERCIAL

## 2025-02-28 DIAGNOSIS — Z12.31 VISIT FOR SCREENING MAMMOGRAM: ICD-10-CM

## 2025-02-28 DIAGNOSIS — C50.211 MALIGNANT NEOPLASM OF UPPER-INNER QUADRANT OF RIGHT FEMALE BREAST (H): ICD-10-CM

## 2025-02-28 PROCEDURE — 77067 SCR MAMMO BI INCL CAD: CPT

## 2025-02-28 PROCEDURE — 77063 BREAST TOMOSYNTHESIS BI: CPT

## 2025-03-11 ENCOUNTER — TRANSFERRED RECORDS (OUTPATIENT)
Dept: HEALTH INFORMATION MANAGEMENT | Facility: CLINIC | Age: 70
End: 2025-03-11
Payer: COMMERCIAL

## 2025-03-29 ENCOUNTER — HEALTH MAINTENANCE LETTER (OUTPATIENT)
Age: 70
End: 2025-03-29

## (undated) DEVICE — NDL 25GA 1.5" 305127

## (undated) DEVICE — ESU PENCIL W/SMOKE EVAC CVPLP2000

## (undated) DEVICE — GLOVE PROTEXIS MICRO 6.0  2D73PM60

## (undated) DEVICE — ESU ELEC BLADE 2.75" COATED/INSULATED E1455

## (undated) DEVICE — SU MONOCRYL 4-0 PS-2 18" UND Y496G

## (undated) DEVICE — SYR BULB IRRIG 50ML LATEX FREE 0035280

## (undated) DEVICE — DECANTER VIAL 2006S

## (undated) DEVICE — ESU GROUND PAD UNIVERSAL W/O CORD

## (undated) DEVICE — CLIP ETHICON LIGACLIP SM BLUE LT100

## (undated) DEVICE — PAD CHUX UNDERPAD 23X24" 7136

## (undated) DEVICE — DRAPE BREAST/CHEST 29420

## (undated) DEVICE — PREP CHLORAPREP 26ML TINTED ORANGE  260815

## (undated) DEVICE — PACK MINOR SBA15MIFSE

## (undated) DEVICE — SYR 10ML FINGER CONTROL W/O NDL 309695

## (undated) DEVICE — GLOVE PROTEXIS BLUE W/NEU-THERA 6.5  2D73EB65

## (undated) DEVICE — SOL WATER IRRIG 1000ML BOTTLE 2F7114

## (undated) DEVICE — SUCTION CANISTER MEDIVAC LINER 3000ML W/LID 65651-530

## (undated) DEVICE — SU VICRYL 3-0 SH 27" J316H

## (undated) DEVICE — SLEEVE PROTECTIVE BREAST BIOPSY  GMSLV001-10

## (undated) DEVICE — LINEN TOWEL PACK X5 5464

## (undated) DEVICE — DRSG STERI STRIP 1/4X3" R1541

## (undated) DEVICE — NDL 19GA 1.5"

## (undated) RX ORDER — DEXAMETHASONE SODIUM PHOSPHATE 4 MG/ML
INJECTION, SOLUTION INTRA-ARTICULAR; INTRALESIONAL; INTRAMUSCULAR; INTRAVENOUS; SOFT TISSUE
Status: DISPENSED
Start: 2021-02-03

## (undated) RX ORDER — LIDOCAINE HYDROCHLORIDE 20 MG/ML
INJECTION, SOLUTION EPIDURAL; INFILTRATION; INTRACAUDAL; PERINEURAL
Status: DISPENSED
Start: 2021-02-03

## (undated) RX ORDER — LIDOCAINE HYDROCHLORIDE 10 MG/ML
INJECTION, SOLUTION EPIDURAL; INFILTRATION; INTRACAUDAL; PERINEURAL
Status: DISPENSED
Start: 2021-02-03

## (undated) RX ORDER — HYDROCODONE BITARTRATE AND ACETAMINOPHEN 5; 325 MG/1; MG/1
TABLET ORAL
Status: DISPENSED
Start: 2021-02-03

## (undated) RX ORDER — FENTANYL CITRATE 50 UG/ML
INJECTION, SOLUTION INTRAMUSCULAR; INTRAVENOUS
Status: DISPENSED
Start: 2021-02-03

## (undated) RX ORDER — ONDANSETRON 2 MG/ML
INJECTION INTRAMUSCULAR; INTRAVENOUS
Status: DISPENSED
Start: 2021-02-03

## (undated) RX ORDER — PROPOFOL 10 MG/ML
INJECTION, EMULSION INTRAVENOUS
Status: DISPENSED
Start: 2021-02-03

## (undated) RX ORDER — HYDROMORPHONE HYDROCHLORIDE 1 MG/ML
INJECTION, SOLUTION INTRAMUSCULAR; INTRAVENOUS; SUBCUTANEOUS
Status: DISPENSED
Start: 2021-02-03

## (undated) RX ORDER — CEFAZOLIN SODIUM 2 G/100ML
INJECTION, SOLUTION INTRAVENOUS
Status: DISPENSED
Start: 2021-02-03

## (undated) RX ORDER — BUPIVACAINE HYDROCHLORIDE 2.5 MG/ML
INJECTION, SOLUTION EPIDURAL; INFILTRATION; INTRACAUDAL
Status: DISPENSED
Start: 2021-02-03

## (undated) RX ORDER — DEXTROSE MONOHYDRATE 50 MG/ML
INJECTION, SOLUTION INTRAVENOUS
Status: DISPENSED
Start: 2021-02-03

## (undated) RX ORDER — FENTANYL CITRATE 0.05 MG/ML
INJECTION, SOLUTION INTRAMUSCULAR; INTRAVENOUS
Status: DISPENSED
Start: 2021-02-03